# Patient Record
Sex: MALE | Race: WHITE | NOT HISPANIC OR LATINO | Employment: OTHER | URBAN - NONMETROPOLITAN AREA
[De-identification: names, ages, dates, MRNs, and addresses within clinical notes are randomized per-mention and may not be internally consistent; named-entity substitution may affect disease eponyms.]

---

## 2024-07-12 ENCOUNTER — HOSPITAL ENCOUNTER (EMERGENCY)
Facility: HOSPITAL | Age: 79
Discharge: LEFT BEFORE DISCHARGE | End: 2024-07-12
Payer: MEDICARE

## 2024-07-12 ENCOUNTER — APPOINTMENT (OUTPATIENT)
Dept: RADIOLOGY | Facility: HOSPITAL | Age: 79
End: 2024-07-12
Payer: MEDICARE

## 2024-07-12 VITALS
TEMPERATURE: 97.9 F | OXYGEN SATURATION: 89 % | BODY MASS INDEX: 25.9 KG/M2 | HEIGHT: 67 IN | DIASTOLIC BLOOD PRESSURE: 82 MMHG | HEART RATE: 94 BPM | RESPIRATION RATE: 24 BRPM | WEIGHT: 165 LBS | SYSTOLIC BLOOD PRESSURE: 133 MMHG

## 2024-07-12 DIAGNOSIS — K52.9 COLITIS: Primary | ICD-10-CM

## 2024-07-12 LAB
ALBUMIN SERPL BCP-MCNC: 4 G/DL (ref 3.4–5)
ALP SERPL-CCNC: 52 U/L (ref 33–136)
ALT SERPL W P-5'-P-CCNC: 15 U/L (ref 10–52)
ANION GAP SERPL CALC-SCNC: 17 MMOL/L (ref 10–20)
APPEARANCE UR: CLEAR
AST SERPL W P-5'-P-CCNC: 26 U/L (ref 9–39)
BACTERIA #/AREA URNS AUTO: ABNORMAL /HPF
BASOPHILS # BLD AUTO: 0.03 X10*3/UL (ref 0–0.1)
BASOPHILS NFR BLD AUTO: 0.6 %
BILIRUB SERPL-MCNC: 1.6 MG/DL (ref 0–1.2)
BILIRUB UR STRIP.AUTO-MCNC: ABNORMAL MG/DL
BUN SERPL-MCNC: 15 MG/DL (ref 6–23)
CALCIUM SERPL-MCNC: 9.2 MG/DL (ref 8.6–10.3)
CHLORIDE SERPL-SCNC: 94 MMOL/L (ref 98–107)
CO2 SERPL-SCNC: 30 MMOL/L (ref 21–32)
COLOR UR: YELLOW
CREAT SERPL-MCNC: 0.85 MG/DL (ref 0.5–1.3)
EGFRCR SERPLBLD CKD-EPI 2021: 88 ML/MIN/1.73M*2
EOSINOPHIL # BLD AUTO: 0.07 X10*3/UL (ref 0–0.4)
EOSINOPHIL NFR BLD AUTO: 1.4 %
ERYTHROCYTE [DISTWIDTH] IN BLOOD BY AUTOMATED COUNT: 12.9 % (ref 11.5–14.5)
GLUCOSE SERPL-MCNC: 81 MG/DL (ref 74–99)
GLUCOSE UR STRIP.AUTO-MCNC: NORMAL MG/DL
HCT VFR BLD AUTO: 47.5 % (ref 41–52)
HGB BLD-MCNC: 17 G/DL (ref 13.5–17.5)
IMM GRANULOCYTES # BLD AUTO: 0.04 X10*3/UL (ref 0–0.5)
IMM GRANULOCYTES NFR BLD AUTO: 0.8 % (ref 0–0.9)
KETONES UR STRIP.AUTO-MCNC: ABNORMAL MG/DL
LACTATE SERPL-SCNC: 1.3 MMOL/L (ref 0.4–2)
LACTATE SERPL-SCNC: 2.2 MMOL/L (ref 0.4–2)
LEUKOCYTE ESTERASE UR QL STRIP.AUTO: NEGATIVE
LIPASE SERPL-CCNC: 11 U/L (ref 9–82)
LYMPHOCYTES # BLD AUTO: 1.1 X10*3/UL (ref 0.8–3)
LYMPHOCYTES NFR BLD AUTO: 21.2 %
MCH RBC QN AUTO: 34.9 PG (ref 26–34)
MCHC RBC AUTO-ENTMCNC: 35.8 G/DL (ref 32–36)
MCV RBC AUTO: 98 FL (ref 80–100)
MONOCYTES # BLD AUTO: 0.37 X10*3/UL (ref 0.05–0.8)
MONOCYTES NFR BLD AUTO: 7.1 %
MUCOUS THREADS #/AREA URNS AUTO: ABNORMAL /LPF
NEUTROPHILS # BLD AUTO: 3.57 X10*3/UL (ref 1.6–5.5)
NEUTROPHILS NFR BLD AUTO: 68.9 %
NITRITE UR QL STRIP.AUTO: NEGATIVE
NRBC BLD-RTO: 0 /100 WBCS (ref 0–0)
PH UR STRIP.AUTO: 6 [PH]
PLATELET # BLD AUTO: 97 X10*3/UL (ref 150–450)
POTASSIUM SERPL-SCNC: 4.1 MMOL/L (ref 3.5–5.3)
PROT SERPL-MCNC: 6.8 G/DL (ref 6.4–8.2)
PROT UR STRIP.AUTO-MCNC: ABNORMAL MG/DL
RBC # BLD AUTO: 4.87 X10*6/UL (ref 4.5–5.9)
RBC # UR STRIP.AUTO: NEGATIVE /UL
RBC #/AREA URNS AUTO: ABNORMAL /HPF
RBC MORPH BLD: NORMAL
SODIUM SERPL-SCNC: 137 MMOL/L (ref 136–145)
SP GR UR STRIP.AUTO: >1.05
SQUAMOUS #/AREA URNS AUTO: ABNORMAL /HPF
UROBILINOGEN UR STRIP.AUTO-MCNC: NORMAL MG/DL
WBC # BLD AUTO: 5.2 X10*3/UL (ref 4.4–11.3)
WBC #/AREA URNS AUTO: ABNORMAL /HPF

## 2024-07-12 PROCEDURE — 2500000005 HC RX 250 GENERAL PHARMACY W/O HCPCS

## 2024-07-12 PROCEDURE — 81001 URINALYSIS AUTO W/SCOPE: CPT | Performed by: PHYSICIAN ASSISTANT

## 2024-07-12 PROCEDURE — 96374 THER/PROPH/DIAG INJ IV PUSH: CPT | Mod: 59,MUEWO

## 2024-07-12 PROCEDURE — 83605 ASSAY OF LACTIC ACID: CPT | Performed by: PHYSICIAN ASSISTANT

## 2024-07-12 PROCEDURE — 80053 COMPREHEN METABOLIC PANEL: CPT | Performed by: PHYSICIAN ASSISTANT

## 2024-07-12 PROCEDURE — 2500000004 HC RX 250 GENERAL PHARMACY W/ HCPCS (ALT 636 FOR OP/ED): Performed by: PHYSICIAN ASSISTANT

## 2024-07-12 PROCEDURE — 74177 CT ABD & PELVIS W/CONTRAST: CPT

## 2024-07-12 PROCEDURE — 85025 COMPLETE CBC W/AUTO DIFF WBC: CPT | Performed by: PHYSICIAN ASSISTANT

## 2024-07-12 PROCEDURE — 87186 SC STD MICRODIL/AGAR DIL: CPT | Mod: GENLAB | Performed by: PHYSICIAN ASSISTANT

## 2024-07-12 PROCEDURE — 83690 ASSAY OF LIPASE: CPT | Performed by: PHYSICIAN ASSISTANT

## 2024-07-12 PROCEDURE — 74177 CT ABD & PELVIS W/CONTRAST: CPT | Performed by: RADIOLOGY

## 2024-07-12 PROCEDURE — 99284 EMERGENCY DEPT VISIT MOD MDM: CPT | Mod: 25

## 2024-07-12 PROCEDURE — 36415 COLL VENOUS BLD VENIPUNCTURE: CPT | Performed by: PHYSICIAN ASSISTANT

## 2024-07-12 PROCEDURE — 2550000001 HC RX 255 CONTRASTS: Performed by: PHYSICIAN ASSISTANT

## 2024-07-12 PROCEDURE — 2500000001 HC RX 250 WO HCPCS SELF ADMINISTERED DRUGS (ALT 637 FOR MEDICARE OP): Performed by: PHYSICIAN ASSISTANT

## 2024-07-12 PROCEDURE — 96374 THER/PROPH/DIAG INJ IV PUSH: CPT | Mod: 59

## 2024-07-12 RX ORDER — CIPROFLOXACIN 500 MG/1
500 TABLET ORAL 2 TIMES DAILY
Qty: 20 TABLET | Refills: 0 | Status: ON HOLD | OUTPATIENT
Start: 2024-07-12 | End: 2024-07-17

## 2024-07-12 RX ORDER — ONDANSETRON HYDROCHLORIDE 2 MG/ML
4 INJECTION, SOLUTION INTRAVENOUS ONCE
Status: COMPLETED | OUTPATIENT
Start: 2024-07-12 | End: 2024-07-12

## 2024-07-12 RX ORDER — CIPROFLOXACIN 500 MG/1
500 TABLET ORAL ONCE
Status: COMPLETED | OUTPATIENT
Start: 2024-07-12 | End: 2024-07-12

## 2024-07-12 RX ORDER — METRONIDAZOLE 500 MG/1
500 TABLET ORAL ONCE
Status: COMPLETED | OUTPATIENT
Start: 2024-07-12 | End: 2024-07-12

## 2024-07-12 RX ORDER — MORPHINE SULFATE 4 MG/ML
4 INJECTION, SOLUTION INTRAMUSCULAR; INTRAVENOUS ONCE
Status: COMPLETED | OUTPATIENT
Start: 2024-07-12 | End: 2024-07-12

## 2024-07-12 RX ORDER — LIDOCAINE HYDROCHLORIDE 20 MG/ML
JELLY TOPICAL
Status: COMPLETED
Start: 2024-07-12 | End: 2024-07-12

## 2024-07-12 RX ORDER — METRONIDAZOLE 500 MG/1
500 TABLET ORAL 3 TIMES DAILY
Qty: 30 TABLET | Refills: 0 | Status: ON HOLD | OUTPATIENT
Start: 2024-07-12 | End: 2024-07-17

## 2024-07-12 RX ORDER — LIDOCAINE HYDROCHLORIDE 20 MG/ML
1 JELLY TOPICAL ONCE
Status: COMPLETED | OUTPATIENT
Start: 2024-07-12 | End: 2024-07-12

## 2024-07-12 ASSESSMENT — PAIN - FUNCTIONAL ASSESSMENT
PAIN_FUNCTIONAL_ASSESSMENT: 0-10

## 2024-07-12 ASSESSMENT — COLUMBIA-SUICIDE SEVERITY RATING SCALE - C-SSRS
2. HAVE YOU ACTUALLY HAD ANY THOUGHTS OF KILLING YOURSELF?: NO
6. HAVE YOU EVER DONE ANYTHING, STARTED TO DO ANYTHING, OR PREPARED TO DO ANYTHING TO END YOUR LIFE?: NO
1. IN THE PAST MONTH, HAVE YOU WISHED YOU WERE DEAD OR WISHED YOU COULD GO TO SLEEP AND NOT WAKE UP?: NO

## 2024-07-12 ASSESSMENT — PAIN SCALES - GENERAL
PAINLEVEL_OUTOF10: 0 - NO PAIN
PAINLEVEL_OUTOF10: 0 - NO PAIN
PAINLEVEL_OUTOF10: 5 - MODERATE PAIN

## 2024-07-12 ASSESSMENT — PAIN DESCRIPTION - LOCATION: LOCATION: ABDOMEN

## 2024-07-12 ASSESSMENT — PAIN DESCRIPTION - ORIENTATION: ORIENTATION: LOWER

## 2024-07-12 NOTE — ED PROVIDER NOTES
I saw and evaluated the patient. I have personally performed a substantive portion of the encounter and have reviewed the resident´s/[facility] documentation and discussed the patient with the provider.  Please see below for further details.    Subjective:  Patient is an 79-year-old male complain of abdominal pain for the past 8 days.  The pain is primarily in the lower to mid abdomen.  It is towards the midline.  He has had some nausea vomiting as well.  He has had some loose stools without blood in the stool.    Objective:  Visit Vitals  /73 (BP Location: Left arm, Patient Position: Sitting)   Pulse (!) 104   Temp 36.6 °C (97.9 °F) (Temporal)   Resp 16       Alert male who is slow to respond  Head atraumatic  Lung sounds are diminished heart tones are normal regular  Abdomen soft positive mid to lower abdominal tenderness positive guarding  Skin without rash  No focal neurodeficit  Results for orders placed or performed during the hospital encounter of 07/12/24 (from the past 24 hour(s))   CBC and Auto Differential   Result Value Ref Range    WBC 5.2 4.4 - 11.3 x10*3/uL    nRBC 0.0 0.0 - 0.0 /100 WBCs    RBC 4.87 4.50 - 5.90 x10*6/uL    Hemoglobin 17.0 13.5 - 17.5 g/dL    Hematocrit 47.5 41.0 - 52.0 %    MCV 98 80 - 100 fL    MCH 34.9 (H) 26.0 - 34.0 pg    MCHC 35.8 32.0 - 36.0 g/dL    RDW 12.9 11.5 - 14.5 %    Platelets 97 (L) 150 - 450 x10*3/uL    Neutrophils % 68.9 40.0 - 80.0 %    Immature Granulocytes %, Automated 0.8 0.0 - 0.9 %    Lymphocytes % 21.2 13.0 - 44.0 %    Monocytes % 7.1 2.0 - 10.0 %    Eosinophils % 1.4 0.0 - 6.0 %    Basophils % 0.6 0.0 - 2.0 %    Neutrophils Absolute 3.57 1.60 - 5.50 x10*3/uL    Immature Granulocytes Absolute, Automated 0.04 0.00 - 0.50 x10*3/uL    Lymphocytes Absolute 1.10 0.80 - 3.00 x10*3/uL    Monocytes Absolute 0.37 0.05 - 0.80 x10*3/uL    Eosinophils Absolute 0.07 0.00 - 0.40 x10*3/uL    Basophils Absolute 0.03 0.00 - 0.10 x10*3/uL   Comprehensive metabolic panel    Result Value Ref Range    Glucose 81 74 - 99 mg/dL    Sodium 137 136 - 145 mmol/L    Potassium 4.1 3.5 - 5.3 mmol/L    Chloride 94 (L) 98 - 107 mmol/L    Bicarbonate 30 21 - 32 mmol/L    Anion Gap 17 10 - 20 mmol/L    Urea Nitrogen 15 6 - 23 mg/dL    Creatinine 0.85 0.50 - 1.30 mg/dL    eGFR 88 >60 mL/min/1.73m*2    Calcium 9.2 8.6 - 10.3 mg/dL    Albumin 4.0 3.4 - 5.0 g/dL    Alkaline Phosphatase 52 33 - 136 U/L    Total Protein 6.8 6.4 - 8.2 g/dL    AST 26 9 - 39 U/L    Bilirubin, Total 1.6 (H) 0.0 - 1.2 mg/dL    ALT 15 10 - 52 U/L   Lipase   Result Value Ref Range    Lipase 11 9 - 82 U/L   Lactate   Result Value Ref Range    Lactate 2.2 (H) 0.4 - 2.0 mmol/L   Morphology   Result Value Ref Range    RBC Morphology No significant RBC morphology present         Assessment:  Patient CT scan shows a 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  There is no obvious extravasation of dye.    Vascular surgery was consulted.  There is no evidence of acute aortic dissection.  There are chronic aneurysmal changes.  The transfer for emergency vascular surgery consultation was canceled.    Plan:    See ED chart      P Katty Alaniz MD  07/12/24 0919

## 2024-07-12 NOTE — ED NOTES
pt requesting to leave AMA at this time, pt SPO2 89% on RA. pt explained risks of levaing AMA at this time by DINA Moran. pt verbalizes understanding, neighbor at bedside. pt signs AMA paperwork. pt A&Ox4, resp appear labored, skin remains dusky colored, pt departs ED via wheelchair alongside RN. pt asissted into neighbor's vehicle. belongings sent.      Jason Gooden RN  07/12/24 [id number]

## 2024-07-13 LAB — HOLD SPECIMEN: NORMAL

## 2024-07-13 NOTE — ED PROVIDER NOTES
"HPI   Chief Complaint   Patient presents with    Abdominal Pain     Abdominal pain started July 4th after eating chicken left in the chair for 2 days. Has diarrhea and severe stomach pains       History of present illness:  79-year-old male presents emergency room for complaints of diarrhea and diffuse abdominal cramping.  The patient states he began having symptoms about 9 days ago.  He states that he ate some leftover chicken that been sitting out in the sun for a while.  He states that his symptoms began shortly thereafter and he states he began having diffuse cramping and diarrhea.  He states he is having 2-3 episodes a day and he states is mostly watery when does happen.  He has not noticed any blood and has not any nausea or vomiting or any lower back pain or fevers or chills or other symptoms at this time.  He does not currently take any daily medications and states he has not seen a doctor in \"decades\".  He is accompanied by a neighbor as well who states that the patient lives by himself and states that he tries to help him as much as possible.  The patient states he also smokes about a pack cigarettes a day and has done so since about the age of 15.  He states he has no other symptoms at this time.      Social history: Negative for alcohol and drug use.    Review of systems:   Gen.: No weight loss, fatigue, anorexia, insomnia, fever.   Eyes: No vision loss, double vision, drainage, eye pain.   ENT: No pharyngitis, neck pain  Cardiac: No chest pain, palpitations, syncope, near syncope.   Pulmonary: No shortness of breath, cough, hemoptysis.   Heme/lymph: No swollen glands, fever, bleeding.   GI: No change in bowel habits, melena, hematemesis, hematochezia, nausea, vomiting  : No discharge, dysuria, frequency, urgency, hematuria.   Musculoskeletal: No limb pain, joint pain, joint swelling.   Skin: No rashes.   Review of systems is otherwise negative unless stated above or in history of present " illness.        Physical exam:  General: .Vitals noted,  EENT: No lymphadenopathy appreciated  Cardiac: Regular, rate, rhythm, no murmur.   Pulmonary: Lungs clear bilaterally with good aeration. No adventitious breath sounds.   Abdomen: Soft, nonsurgical. No peritoneal signs. Normoactive bowel sounds.  Complains of tenderness palpation throughout the abdomen, no mass appreciated  Extremities: No peripheral edema.   Skin: No rash.   Neuro: No focal neurologic deficits            Medical decision making:   Plan:  CBC CMP lipase lactate urinalysis, CT scan pelvis with contrast: Laboratory testing slight UTI was noted that leukocytosis was not present, CT scan was initially concerning for possible aneurysm and the technologist that took the film requested that we make a stat read and spoke with me concerning her concerns.  I reviewed the films and have the attending physician look at them as well who also agreed that there are concerns for possible new onset aneurysm or possible dissection?  I spoke with rad abs at this time and requested a stat read to make this a priority at this time but they explained to me that they were backed up and that they do their best to get this ready soon as possible.  I reached out to the transfer center who got to vascular surgery on the phone who was able to review the film while on the phone with me and explained to me that this appeared to be a chronic aneurysm that there is no concern for dissection from their point and they believe the patient can follow-up in outpatient setting at this time but would not require transfer.  CT scan was eventually read as concern for colitis and chronic aneurysm as well.  I explained to the patient the test results at this time and that we will be sending him home on oral antibiotics and he was agreeable to this plan.  Nursing staff noted that the patient's pulse ox had been slowly dipping while he been here at the emergency room recently starting at  97 to 98% on room air but quickly dropping down to low 90s.  The patient was observed for short period of time longer and his oxygen dropped to 88%.  We placed him on 2 L of oxygen at this time.  I spoke with the patient at bedside and explained to him that I have concerns about this and requested that he undergo further testing this time.  The patient refused further testing stating that he does want to go home MiraLA[facility] and he had been here all day already.  I explained to the patient that I am unsure the cause of his hypoxia though and I would like to do further testing on him to make sure there is nothing significant at this time.  I explained to him there is possibility that he could just be suffering from a COPD exacerbation and that we could treat this but I was unsure and he confirmed from he did not have oxygen at home.  The patient insisted upon leaving at this time with his neighbor and stated that he did not want to stay for any further testing and they did not want to be admitted at this time.  I explained to him that he would need to sign out AMA at this time and he is agreeable this plan.  I explained to him that he would have to assume full care and responsibility for his actions and I believe that he had capacity to do this.  He verbalized understanding this and explained to me that he understood that he was released in the hospital small liability and that he was taking full responsibility for his actions whenever consequences may occur from there including but not limited to serious harm, part permanent disfigurement and/or death.  I explained to him that he is welcome to return here if he felt the need to and explained to him that I be sending him home with oral antibiotics at this time.  I encouraged him to please follow-up closely with her primary care doctor as well as general surgery at this time he is given a referral page.  I encouraged him to please return in the event that he change his  mind.  I also spoke with his neighbor as well who verbalized understanding of this as well.  The patient signed AMA paperwork at this time presents by nursing staff and was released.    Impression:   1.  Colitis  2.  Hypoxia          History provided by:  Patient   used: No                        Oneyda Coma Scale Score: 15                     Patient History   Past Medical History:   Diagnosis Date    Personal history of other (healed) physical injury and trauma     History of head injury    Personal history of other diseases of the digestive system 10/07/2013    History of esophageal reflux     Past Surgical History:   Procedure Laterality Date    MR HEAD ANGIO WO IV CONTRAST  2/25/2015    MR HEAD ANGIO WO IV CONTRAST 2/25/2015 [facility] INPATIENT LEGACY    ROTATOR CUFF REPAIR  03/25/2013    Rotator Cuff Repair     No family history on file.  Social History     Tobacco Use    Smoking status: Every Day     Current packs/day: 1.00     Types: Cigarettes    Smokeless tobacco: Never   Substance Use Topics    Alcohol use: Not on file    Drug use: Not on file       Physical Exam   ED Triage Vitals [07/12/24 1540]   Temperature Heart Rate Respirations BP   36.6 °C (97.9 °F) (!) 104 16 101/73      Pulse Ox Temp Source Heart Rate Source Patient Position   (!) 93 % Temporal Monitor Sitting      BP Location FiO2 (%)     Left arm --       Physical Exam    ED Course & MDM   Diagnoses as of 07/13/24 1659   Colitis       Medical Decision Making      Procedure  Procedures     Erik Moran PA-C  07/13/24 1705       Erik Moran PA-C  07/13/24 1705

## 2024-07-14 LAB — BACTERIA UR CULT: ABNORMAL

## 2024-07-15 ENCOUNTER — APPOINTMENT (OUTPATIENT)
Dept: RADIOLOGY | Facility: HOSPITAL | Age: 79
End: 2024-07-15
Payer: MEDICARE

## 2024-07-15 ENCOUNTER — APPOINTMENT (OUTPATIENT)
Dept: CARDIOLOGY | Facility: HOSPITAL | Age: 79
End: 2024-07-15
Payer: MEDICARE

## 2024-07-15 ENCOUNTER — HOSPITAL ENCOUNTER (EMERGENCY)
Facility: HOSPITAL | Age: 79
Discharge: OTHER INSTITUTION | End: 2024-07-16
Attending: EMERGENCY MEDICINE
Payer: MEDICARE

## 2024-07-15 DIAGNOSIS — R10.9 ABDOMINAL PAIN, UNSPECIFIED ABDOMINAL LOCATION: ICD-10-CM

## 2024-07-15 DIAGNOSIS — I72.3 ILIAC ARTERY ANEURYSM, RIGHT (CMS-HCC): ICD-10-CM

## 2024-07-15 DIAGNOSIS — I77.1 CELIAC ARTERY STENOSIS (CMS-HCC): Primary | ICD-10-CM

## 2024-07-15 LAB
ALBUMIN SERPL BCP-MCNC: 3.5 G/DL (ref 3.4–5)
ALP SERPL-CCNC: 44 U/L (ref 33–136)
ALT SERPL W P-5'-P-CCNC: 13 U/L (ref 10–52)
ANION GAP SERPL CALC-SCNC: 14 MMOL/L (ref 10–20)
APPEARANCE UR: CLEAR
AST SERPL W P-5'-P-CCNC: 18 U/L (ref 9–39)
BACTERIA UR CULT: ABNORMAL
BASOPHILS # BLD AUTO: 0.03 X10*3/UL (ref 0–0.1)
BASOPHILS NFR BLD AUTO: 0.9 %
BILIRUB SERPL-MCNC: 1.4 MG/DL (ref 0–1.2)
BILIRUB UR STRIP.AUTO-MCNC: NEGATIVE MG/DL
BNP SERPL-MCNC: 229 PG/ML (ref 0–99)
BUN SERPL-MCNC: 19 MG/DL (ref 6–23)
CALCIUM SERPL-MCNC: 8.7 MG/DL (ref 8.6–10.3)
CARDIAC TROPONIN I PNL SERPL HS: 43 NG/L (ref 0–20)
CARDIAC TROPONIN I PNL SERPL HS: 53 NG/L (ref 0–20)
CHLORIDE SERPL-SCNC: 96 MMOL/L (ref 98–107)
CO2 SERPL-SCNC: 33 MMOL/L (ref 21–32)
COLOR UR: ABNORMAL
CREAT SERPL-MCNC: 0.89 MG/DL (ref 0.5–1.3)
EGFRCR SERPLBLD CKD-EPI 2021: 87 ML/MIN/1.73M*2
EOSINOPHIL # BLD AUTO: 0.05 X10*3/UL (ref 0–0.4)
EOSINOPHIL NFR BLD AUTO: 1.5 %
ERYTHROCYTE [DISTWIDTH] IN BLOOD BY AUTOMATED COUNT: 13.2 % (ref 11.5–14.5)
GLUCOSE SERPL-MCNC: 80 MG/DL (ref 74–99)
GLUCOSE UR STRIP.AUTO-MCNC: NORMAL MG/DL
HCT VFR BLD AUTO: 44.2 % (ref 41–52)
HGB BLD-MCNC: 15.5 G/DL (ref 13.5–17.5)
HYALINE CASTS #/AREA URNS AUTO: ABNORMAL /LPF
IMM GRANULOCYTES # BLD AUTO: 0.01 X10*3/UL (ref 0–0.5)
IMM GRANULOCYTES NFR BLD AUTO: 0.3 % (ref 0–0.9)
KETONES UR STRIP.AUTO-MCNC: ABNORMAL MG/DL
LEUKOCYTE ESTERASE UR QL STRIP.AUTO: ABNORMAL
LYMPHOCYTES # BLD AUTO: 0.83 X10*3/UL (ref 0.8–3)
LYMPHOCYTES NFR BLD AUTO: 25.2 %
MAGNESIUM SERPL-MCNC: 1.49 MG/DL (ref 1.6–2.4)
MCH RBC QN AUTO: 34.4 PG (ref 26–34)
MCHC RBC AUTO-ENTMCNC: 35.1 G/DL (ref 32–36)
MCV RBC AUTO: 98 FL (ref 80–100)
MONOCYTES # BLD AUTO: 0.31 X10*3/UL (ref 0.05–0.8)
MONOCYTES NFR BLD AUTO: 9.4 %
MUCOUS THREADS #/AREA URNS AUTO: ABNORMAL /LPF
NEUTROPHILS # BLD AUTO: 2.07 X10*3/UL (ref 1.6–5.5)
NEUTROPHILS NFR BLD AUTO: 62.7 %
NITRITE UR QL STRIP.AUTO: NEGATIVE
NRBC BLD-RTO: 0 /100 WBCS (ref 0–0)
PH UR STRIP.AUTO: 6 [PH]
PLATELET # BLD AUTO: 93 X10*3/UL (ref 150–450)
POTASSIUM SERPL-SCNC: 3.5 MMOL/L (ref 3.5–5.3)
PROT SERPL-MCNC: 5.9 G/DL (ref 6.4–8.2)
PROT UR STRIP.AUTO-MCNC: ABNORMAL MG/DL
RBC # BLD AUTO: 4.51 X10*6/UL (ref 4.5–5.9)
RBC # UR STRIP.AUTO: NEGATIVE /UL
RBC #/AREA URNS AUTO: ABNORMAL /HPF
SODIUM SERPL-SCNC: 139 MMOL/L (ref 136–145)
SP GR UR STRIP.AUTO: 1.04
SQUAMOUS #/AREA URNS AUTO: ABNORMAL /HPF
UROBILINOGEN UR STRIP.AUTO-MCNC: ABNORMAL MG/DL
WBC # BLD AUTO: 3.3 X10*3/UL (ref 4.4–11.3)
WBC #/AREA URNS AUTO: ABNORMAL /HPF
WBC CLUMPS #/AREA URNS AUTO: ABNORMAL /HPF

## 2024-07-15 PROCEDURE — 72131 CT LUMBAR SPINE W/O DYE: CPT | Mod: RCN | Performed by: RADIOLOGY

## 2024-07-15 PROCEDURE — 36415 COLL VENOUS BLD VENIPUNCTURE: CPT | Performed by: EMERGENCY MEDICINE

## 2024-07-15 PROCEDURE — 2500000004 HC RX 250 GENERAL PHARMACY W/ HCPCS (ALT 636 FOR OP/ED)

## 2024-07-15 PROCEDURE — 96374 THER/PROPH/DIAG INJ IV PUSH: CPT

## 2024-07-15 PROCEDURE — 85025 COMPLETE CBC W/AUTO DIFF WBC: CPT | Performed by: EMERGENCY MEDICINE

## 2024-07-15 PROCEDURE — 83880 ASSAY OF NATRIURETIC PEPTIDE: CPT | Performed by: EMERGENCY MEDICINE

## 2024-07-15 PROCEDURE — 84484 ASSAY OF TROPONIN QUANT: CPT | Performed by: EMERGENCY MEDICINE

## 2024-07-15 PROCEDURE — 99285 EMERGENCY DEPT VISIT HI MDM: CPT | Mod: 25

## 2024-07-15 PROCEDURE — 96366 THER/PROPH/DIAG IV INF ADDON: CPT

## 2024-07-15 PROCEDURE — 72131 CT LUMBAR SPINE W/O DYE: CPT | Mod: RCN

## 2024-07-15 PROCEDURE — 96365 THER/PROPH/DIAG IV INF INIT: CPT

## 2024-07-15 PROCEDURE — 96361 HYDRATE IV INFUSION ADD-ON: CPT

## 2024-07-15 PROCEDURE — 72128 CT CHEST SPINE W/O DYE: CPT | Mod: RCN | Performed by: RADIOLOGY

## 2024-07-15 PROCEDURE — 93005 ELECTROCARDIOGRAM TRACING: CPT

## 2024-07-15 PROCEDURE — 72128 CT CHEST SPINE W/O DYE: CPT | Mod: RCN

## 2024-07-15 PROCEDURE — 96367 TX/PROPH/DG ADDL SEQ IV INF: CPT

## 2024-07-15 PROCEDURE — 2500000005 HC RX 250 GENERAL PHARMACY W/O HCPCS: Performed by: EMERGENCY MEDICINE

## 2024-07-15 PROCEDURE — 71275 CT ANGIOGRAPHY CHEST: CPT

## 2024-07-15 PROCEDURE — 81001 URINALYSIS AUTO W/SCOPE: CPT | Performed by: EMERGENCY MEDICINE

## 2024-07-15 PROCEDURE — 2500000004 HC RX 250 GENERAL PHARMACY W/ HCPCS (ALT 636 FOR OP/ED): Performed by: EMERGENCY MEDICINE

## 2024-07-15 PROCEDURE — 87086 URINE CULTURE/COLONY COUNT: CPT | Mod: GENLAB | Performed by: EMERGENCY MEDICINE

## 2024-07-15 PROCEDURE — 83735 ASSAY OF MAGNESIUM: CPT | Performed by: EMERGENCY MEDICINE

## 2024-07-15 PROCEDURE — 2550000001 HC RX 255 CONTRASTS: Performed by: EMERGENCY MEDICINE

## 2024-07-15 PROCEDURE — 96375 TX/PRO/DX INJ NEW DRUG ADDON: CPT

## 2024-07-15 PROCEDURE — 84075 ASSAY ALKALINE PHOSPHATASE: CPT | Performed by: EMERGENCY MEDICINE

## 2024-07-15 PROCEDURE — 71275 CT ANGIOGRAPHY CHEST: CPT | Performed by: RADIOLOGY

## 2024-07-15 PROCEDURE — 74174 CTA ABD&PLVS W/CONTRAST: CPT | Performed by: RADIOLOGY

## 2024-07-15 RX ORDER — ONDANSETRON HYDROCHLORIDE 2 MG/ML
4 INJECTION, SOLUTION INTRAVENOUS ONCE
Status: COMPLETED | OUTPATIENT
Start: 2024-07-15 | End: 2024-07-15

## 2024-07-15 RX ORDER — ONDANSETRON HYDROCHLORIDE 2 MG/ML
INJECTION, SOLUTION INTRAVENOUS
Status: COMPLETED
Start: 2024-07-15 | End: 2024-07-15

## 2024-07-15 RX ORDER — MORPHINE SULFATE 4 MG/ML
INJECTION, SOLUTION INTRAMUSCULAR; INTRAVENOUS
Status: COMPLETED
Start: 2024-07-15 | End: 2024-07-15

## 2024-07-15 RX ORDER — MORPHINE SULFATE 4 MG/ML
4 INJECTION, SOLUTION INTRAMUSCULAR; INTRAVENOUS ONCE
Status: COMPLETED | OUTPATIENT
Start: 2024-07-15 | End: 2024-07-15

## 2024-07-15 RX ORDER — MAGNESIUM SULFATE HEPTAHYDRATE 40 MG/ML
2 INJECTION, SOLUTION INTRAVENOUS ONCE
Status: COMPLETED | OUTPATIENT
Start: 2024-07-15 | End: 2024-07-15

## 2024-07-15 RX ORDER — CEFTRIAXONE 2 G/50ML
2 INJECTION, SOLUTION INTRAVENOUS ONCE
Status: COMPLETED | OUTPATIENT
Start: 2024-07-15 | End: 2024-07-15

## 2024-07-15 RX ADMIN — ONDANSETRON 4 MG: 2 INJECTION INTRAMUSCULAR; INTRAVENOUS at 16:05

## 2024-07-15 RX ADMIN — IOHEXOL 75 ML: 350 INJECTION, SOLUTION INTRAVENOUS at 16:20

## 2024-07-15 RX ADMIN — Medication 2 L/MIN: at 16:26

## 2024-07-15 RX ADMIN — MORPHINE SULFATE 4 MG: 4 INJECTION, SOLUTION INTRAMUSCULAR; INTRAVENOUS at 16:05

## 2024-07-15 RX ADMIN — CEFTRIAXONE 2 G: 2 INJECTION, SOLUTION INTRAVENOUS at 18:18

## 2024-07-15 RX ADMIN — ONDANSETRON HYDROCHLORIDE 4 MG: 2 INJECTION, SOLUTION INTRAVENOUS at 16:05

## 2024-07-15 RX ADMIN — SODIUM CHLORIDE 1000 ML: 9 INJECTION, SOLUTION INTRAVENOUS at 16:06

## 2024-07-15 RX ADMIN — MAGNESIUM SULFATE IN WATER 2 G: 40 INJECTION, SOLUTION INTRAVENOUS at 19:12

## 2024-07-15 RX ADMIN — Medication 2 L/MIN: at 20:00

## 2024-07-15 ASSESSMENT — PAIN SCALES - GENERAL
PAINLEVEL_OUTOF10: 4
PAINLEVEL_OUTOF10: 10 - WORST POSSIBLE PAIN

## 2024-07-15 ASSESSMENT — PAIN DESCRIPTION - LOCATION: LOCATION: ABDOMEN

## 2024-07-15 ASSESSMENT — PAIN - FUNCTIONAL ASSESSMENT: PAIN_FUNCTIONAL_ASSESSMENT: 0-10

## 2024-07-15 NOTE — ED PROVIDER NOTES
HPI   Chief Complaint   Patient presents with    Abdominal Pain     Pt having severe abd pain that started a little over a week ago, was seen in ED a few days ago and they were initially going to fly pt out because they thought he had an AAA, however, they called back and said his scans were chronic and he was not a dissection. Pt still having severe abd pain, not able to eat or drink, per pt neighbor, he has lost weight over the last couple weeks. 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  Complaining of severe lower abdominal pain.  He is having difficulty tolerating liquids or solids.  He is lost some weight over the past couple weeks.  He is also having difficulty holding his urine and bowel movements.      History provided by:  Patient and medical records                      Oneyda Coma Scale Score: 15                     Patient History   Past Medical History:   Diagnosis Date    Personal history of other (healed) physical injury and trauma     History of head injury    Personal history of other diseases of the digestive system 10/07/2013    History of esophageal reflux     Past Surgical History:   Procedure Laterality Date    MR HEAD ANGIO WO IV CONTRAST  2/25/2015    MR HEAD ANGIO WO IV CONTRAST 2/25/2015 [facility] INPATIENT LEGACY    ROTATOR CUFF REPAIR  03/25/2013    Rotator Cuff Repair     No family history on file.  Social History     Tobacco Use    Smoking status: Every Day     Current packs/day: 1.00     Types: Cigarettes    Smokeless tobacco: Never   Substance Use Topics    Alcohol use: Not on file    Drug use: Not on file       Physical Exam   ED Triage Vitals [07/15/24 1554]   Temperature Heart Rate Respirations BP   36.9 °C (98.5 °F) 77 (!) 22 126/88       Pulse Ox Temp Source Heart Rate Source Patient Position   94 % Temporal -- --      BP Location FiO2 (%)     -- --       Physical Exam  Vitals and nursing note reviewed.   Constitutional:       General: He is not in acute distress.     Appearance: He is well-developed.   HENT:      Head: Normocephalic and atraumatic.   Eyes:      Conjunctiva/sclera: Conjunctivae normal.   Cardiovascular:      Rate and Rhythm: Normal rate and regular rhythm.      Pulses:           Radial pulses are 2+ on the right side and 2+ on the left side.        Dorsalis pedis pulses are 2+ on the right side and 2+ on the left side.      Heart sounds: No murmur heard.     Comments: Equal peripheral pulses to bilateral radial and DP.  Pulmonary:      Effort: Pulmonary effort is normal. No respiratory distress.      Breath sounds: Normal breath sounds.   Abdominal:      Palpations: Abdomen is soft.      Tenderness: There is generalized abdominal tenderness. There is no right CVA tenderness, left CVA tenderness, guarding or rebound.      Comments: 2+ right and left symmetric femoral pulses.  Bilateral lower extremities are well-perfused.  No evidence of acute limb ischemia.   Musculoskeletal:         General: No swelling, tenderness or deformity.      Cervical back: Neck supple.   Skin:     General: Skin is warm and dry.      Capillary Refill: Capillary refill takes less than 2 seconds.   Neurological:      Mental Status: He is alert and oriented to person, place, and time.      Cranial Nerves: No cranial nerve deficit.      Sensory: No sensory deficit.      Motor: No weakness.      Coordination: Coordination normal.      Gait: Gait normal.   Psychiatric:         Mood and Affect: Mood normal.         ED Course & MDM   ED Course as of 07/23/24 0717   Mon Jul 15, 2024   7533 IMPRESSION:  1. Cirrhotic liver with varices.  2. Apparent gallbladder wall thickening, most likely due to hepatic  disease. 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  3. Enlarged heterogenous prostate gland projecting in the bladder  floor.  4. Minimal nonspecific colonic wall thickening, possibly due to  infectious or inflammatory colitis, without abscess or bowel  obstruction.      Signed by: Kaiden James 7/12/2024 4:42 PM      Noted from CT 7/12/24 [BT]   1601 ECG 12 lead  EKG shows sinus rhythm with occasional PVCs.  Right bundle branch block.  Rate = 78.  No acute injury pattern.  EKG performed on 7/12 showed PVCs as well [BT]   1628 79-year-old male with known right iliac artery aneurysm presents with ongoing abdominal pain.  Labs, morphine for pain, Zofran for nausea, saline bolus.  CTA chest abdomen and pelvis to evaluate for aortic dissection or other cause of symptoms. [BT]   1726 Troponin I, High Sensitivity(!!): 53  First troponin 53 [BT]   1726 ECG 12 lead  EKG shows sinus rhythm with right bundle branch block.  Rate = 73.  Nonspecific ST-T changes.  No acute injury pattern.  Unchanged from prior. [BT]   1747 ABDOMEN - PELVIS  1.  Cirrhotic liver.  2. Distended gallbladder.  3. Atherosclerosis as described. 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  4. Prostatomegaly.   [BT]   1748 PLATELETS (10*3/UL) IN BLOOD AUTOMATED COUNT, LAKHWINDER(!): 93  Stable thrombocytopenia [BT]   1807 Urinalysis with Reflex Microscopic(!)  Urine shows UTI.  He was given Rocephin. [BT]   1842 Will discuss mesenteric artery stenosis with vascular surgery. [BT]   1844 MAGNESIUM(!): 1.49  Replaced with IV magnesium [BT]   1908 D/W vascular surgery Dr. Kruse who advised transfer to [facility] for observation and  vascular surgery consultation if needed. [BT]   1948 Spoke with Clair Zarco NP at [facility] who accepted patient for hospitalization under observation with telemetry under the service of Dr. Claire [BT]      ED Course User Index  [BT] Hernandez Esparza DO         Diagnoses as of 07/23/24 0717   Abdominal pain, unspecified abdominal location   Iliac artery aneurysm, right (CMS-HCC)   Celiac artery stenosis (CMS-HCC)       Medical Decision Making      Procedure  Procedures     Hernandez Esparza DO  07/23/24 0719

## 2024-07-15 NOTE — ED TRIAGE NOTES
Pt having severe abd pain that started a little over a week ago, was seen in ED a few days ago and they were initially going to fly pt out because they thought he had an AAA, however, they called back and said his scans were chronic and he was not a dissection. Pt still having severe abd pain, not able to eat or drink, per pt neighbor, he has lost weight over the last couple weeks. Pt states the last few weeks he has had a hard time holding his urine and bowel movements

## 2024-07-16 ENCOUNTER — APPOINTMENT (OUTPATIENT)
Dept: RADIOLOGY | Facility: HOSPITAL | Age: 79
End: 2024-07-16
Payer: MEDICARE

## 2024-07-16 ENCOUNTER — APPOINTMENT (OUTPATIENT)
Dept: CARDIOLOGY | Facility: HOSPITAL | Age: 79
End: 2024-07-16
Payer: MEDICARE

## 2024-07-16 ENCOUNTER — HOSPITAL ENCOUNTER (INPATIENT)
Facility: HOSPITAL | Age: 79
LOS: 4 days | Discharge: HOME | End: 2024-07-20
Attending: INTERNAL MEDICINE | Admitting: INTERNAL MEDICINE
Payer: MEDICARE

## 2024-07-16 ENCOUNTER — APPOINTMENT (OUTPATIENT)
Dept: VASCULAR MEDICINE | Facility: HOSPITAL | Age: 79
End: 2024-07-16
Payer: MEDICARE

## 2024-07-16 VITALS
HEART RATE: 78 BPM | HEIGHT: 67 IN | OXYGEN SATURATION: 96 % | BODY MASS INDEX: 22.49 KG/M2 | DIASTOLIC BLOOD PRESSURE: 80 MMHG | RESPIRATION RATE: 18 BRPM | SYSTOLIC BLOOD PRESSURE: 137 MMHG | WEIGHT: 143.3 LBS | TEMPERATURE: 98.5 F

## 2024-07-16 DIAGNOSIS — R45.1 AGITATION: ICD-10-CM

## 2024-07-16 DIAGNOSIS — R10.9 INTRACTABLE ABDOMINAL PAIN: Primary | ICD-10-CM

## 2024-07-16 DIAGNOSIS — K52.9 COLITIS: ICD-10-CM

## 2024-07-16 DIAGNOSIS — N39.0 RECURRENT UTI: ICD-10-CM

## 2024-07-16 DIAGNOSIS — K55.1 MESENTERIC ISCHEMIA, CHRONIC (MULTI): ICD-10-CM

## 2024-07-16 LAB
ALBUMIN SERPL BCP-MCNC: 3.3 G/DL (ref 3.4–5)
ALP SERPL-CCNC: 43 U/L (ref 33–136)
ALT SERPL W P-5'-P-CCNC: 13 U/L (ref 10–52)
ANION GAP SERPL CALC-SCNC: 11 MMOL/L (ref 10–20)
APPEARANCE UR: CLEAR
APTT PPP: 31 SECONDS (ref 27–38)
AST SERPL W P-5'-P-CCNC: 17 U/L (ref 9–39)
ATRIAL RATE: 73 BPM
ATRIAL RATE: 90 BPM
BILIRUB SERPL-MCNC: 0.9 MG/DL (ref 0–1.2)
BILIRUB UR STRIP.AUTO-MCNC: NEGATIVE MG/DL
BUN SERPL-MCNC: 17 MG/DL (ref 6–23)
CALCIUM SERPL-MCNC: 8 MG/DL (ref 8.6–10.3)
CHLORIDE SERPL-SCNC: 97 MMOL/L (ref 98–107)
CO2 SERPL-SCNC: 34 MMOL/L (ref 21–32)
COLOR UR: YELLOW
CREAT SERPL-MCNC: 0.79 MG/DL (ref 0.5–1.3)
EGFRCR SERPLBLD CKD-EPI 2021: 90 ML/MIN/1.73M*2
ERYTHROCYTE [DISTWIDTH] IN BLOOD BY AUTOMATED COUNT: 13.3 % (ref 11.5–14.5)
GLUCOSE SERPL-MCNC: 120 MG/DL (ref 74–99)
GLUCOSE UR STRIP.AUTO-MCNC: NORMAL MG/DL
HCT VFR BLD AUTO: 41.6 % (ref 41–52)
HGB BLD-MCNC: 14.3 G/DL (ref 13.5–17.5)
HOLD SPECIMEN: NORMAL
INR PPP: 1.1 (ref 0.9–1.1)
KETONES UR STRIP.AUTO-MCNC: ABNORMAL MG/DL
LACTATE SERPL-SCNC: 1 MMOL/L (ref 0.4–2)
LACTATE SERPL-SCNC: 1 MMOL/L (ref 0.4–2)
LACTATE SERPL-SCNC: 1.6 MMOL/L (ref 0.4–2)
LEUKOCYTE ESTERASE UR QL STRIP.AUTO: NEGATIVE
MAGNESIUM SERPL-MCNC: 1.65 MG/DL (ref 1.6–2.4)
MCH RBC QN AUTO: 34.5 PG (ref 26–34)
MCHC RBC AUTO-ENTMCNC: 34.4 G/DL (ref 32–36)
MCV RBC AUTO: 100 FL (ref 80–100)
NITRITE UR QL STRIP.AUTO: NEGATIVE
NRBC BLD-RTO: 0 /100 WBCS (ref 0–0)
P AXIS: 74 DEGREES
P AXIS: 77 DEGREES
P OFFSET: 208 MS
P OFFSET: 209 MS
P ONSET: 154 MS
P ONSET: 158 MS
PH UR STRIP.AUTO: 5.5 [PH]
PLATELET # BLD AUTO: 84 X10*3/UL (ref 150–450)
POTASSIUM SERPL-SCNC: 3.5 MMOL/L (ref 3.5–5.3)
PR INTERVAL: 130 MS
PR INTERVAL: 144 MS
PROT SERPL-MCNC: 5.6 G/DL (ref 6.4–8.2)
PROT UR STRIP.AUTO-MCNC: ABNORMAL MG/DL
PROTHROMBIN TIME: 12.8 SECONDS (ref 9.8–12.8)
Q ONSET: 223 MS
Q ONSET: 226 MS
QRS COUNT: 12 BEATS
QRS COUNT: 15 BEATS
QRS DURATION: 140 MS
QRS DURATION: 146 MS
QT INTERVAL: 432 MS
QT INTERVAL: 460 MS
QTC CALCULATION(BAZETT): 506 MS
QTC CALCULATION(BAZETT): 528 MS
QTC FREDERICIA: 491 MS
QTC FREDERICIA: 494 MS
R AXIS: 10 DEGREES
R AXIS: 58 DEGREES
RBC # BLD AUTO: 4.15 X10*6/UL (ref 4.5–5.9)
RBC # UR STRIP.AUTO: NEGATIVE /UL
RBC #/AREA URNS AUTO: NORMAL /HPF
SODIUM SERPL-SCNC: 138 MMOL/L (ref 136–145)
SP GR UR STRIP.AUTO: 1.03
T AXIS: 12 DEGREES
T AXIS: 56 DEGREES
T OFFSET: 439 MS
T OFFSET: 456 MS
UFH PPP CHRO-ACNC: 0.7 IU/ML
UFH PPP CHRO-ACNC: <0.1 IU/ML
URATE CRY #/AREA UR COMP ASSIST: NORMAL /HPF
UROBILINOGEN UR STRIP.AUTO-MCNC: NORMAL MG/DL
VENTRICULAR RATE: 73 BPM
VENTRICULAR RATE: 90 BPM
WBC # BLD AUTO: 3.7 X10*3/UL (ref 4.4–11.3)
WBC #/AREA URNS AUTO: NORMAL /HPF

## 2024-07-16 PROCEDURE — 93975 VASCULAR STUDY: CPT

## 2024-07-16 PROCEDURE — 99223 1ST HOSP IP/OBS HIGH 75: CPT | Performed by: NURSE PRACTITIONER

## 2024-07-16 PROCEDURE — 87493 C DIFF AMPLIFIED PROBE: CPT | Mod: 59,GEALAB | Performed by: NURSE PRACTITIONER

## 2024-07-16 PROCEDURE — 87506 IADNA-DNA/RNA PROBE TQ 6-11: CPT | Mod: GEALAB | Performed by: NURSE PRACTITIONER

## 2024-07-16 PROCEDURE — 99221 1ST HOSP IP/OBS SF/LOW 40: CPT | Performed by: INTERNAL MEDICINE

## 2024-07-16 PROCEDURE — 93975 VASCULAR STUDY: CPT | Performed by: INTERNAL MEDICINE

## 2024-07-16 PROCEDURE — 85730 THROMBOPLASTIN TIME PARTIAL: CPT | Performed by: NURSE PRACTITIONER

## 2024-07-16 PROCEDURE — 93975 VASCULAR STUDY: CPT | Performed by: RADIOLOGY

## 2024-07-16 PROCEDURE — 83605 ASSAY OF LACTIC ACID: CPT | Performed by: NURSE PRACTITIONER

## 2024-07-16 PROCEDURE — 2500000002 HC RX 250 W HCPCS SELF ADMINISTERED DRUGS (ALT 637 FOR MEDICARE OP, ALT 636 FOR OP/ED): Performed by: INTERNAL MEDICINE

## 2024-07-16 PROCEDURE — 81001 URINALYSIS AUTO W/SCOPE: CPT | Performed by: NURSE PRACTITIONER

## 2024-07-16 PROCEDURE — 2500000004 HC RX 250 GENERAL PHARMACY W/ HCPCS (ALT 636 FOR OP/ED)

## 2024-07-16 PROCEDURE — 85610 PROTHROMBIN TIME: CPT | Performed by: NURSE PRACTITIONER

## 2024-07-16 PROCEDURE — 83605 ASSAY OF LACTIC ACID: CPT | Performed by: EMERGENCY MEDICINE

## 2024-07-16 PROCEDURE — 2500000002 HC RX 250 W HCPCS SELF ADMINISTERED DRUGS (ALT 637 FOR MEDICARE OP, ALT 636 FOR OP/ED): Performed by: NURSE PRACTITIONER

## 2024-07-16 PROCEDURE — 76705 ECHO EXAM OF ABDOMEN: CPT | Performed by: RADIOLOGY

## 2024-07-16 PROCEDURE — 85520 HEPARIN ASSAY: CPT | Performed by: NURSE PRACTITIONER

## 2024-07-16 PROCEDURE — 94664 DEMO&/EVAL PT USE INHALER: CPT

## 2024-07-16 PROCEDURE — S4991 NICOTINE PATCH NONLEGEND: HCPCS | Performed by: NURSE PRACTITIONER

## 2024-07-16 PROCEDURE — 85027 COMPLETE CBC AUTOMATED: CPT | Performed by: NURSE PRACTITIONER

## 2024-07-16 PROCEDURE — 36415 COLL VENOUS BLD VENIPUNCTURE: CPT | Performed by: EMERGENCY MEDICINE

## 2024-07-16 PROCEDURE — 94640 AIRWAY INHALATION TREATMENT: CPT

## 2024-07-16 PROCEDURE — 85520 HEPARIN ASSAY: CPT | Performed by: INTERNAL MEDICINE

## 2024-07-16 PROCEDURE — 1200000002 HC GENERAL ROOM WITH TELEMETRY DAILY

## 2024-07-16 PROCEDURE — 96375 TX/PRO/DX INJ NEW DRUG ADDON: CPT

## 2024-07-16 PROCEDURE — C9113 INJ PANTOPRAZOLE SODIUM, VIA: HCPCS | Performed by: NURSE PRACTITIONER

## 2024-07-16 PROCEDURE — 87328 CRYPTOSPORIDIUM AG IA: CPT | Performed by: NURSE PRACTITIONER

## 2024-07-16 PROCEDURE — 80053 COMPREHEN METABOLIC PANEL: CPT | Performed by: NURSE PRACTITIONER

## 2024-07-16 PROCEDURE — 83735 ASSAY OF MAGNESIUM: CPT | Performed by: NURSE PRACTITIONER

## 2024-07-16 PROCEDURE — 87329 GIARDIA AG IA: CPT | Performed by: NURSE PRACTITIONER

## 2024-07-16 PROCEDURE — 36415 COLL VENOUS BLD VENIPUNCTURE: CPT | Performed by: NURSE PRACTITIONER

## 2024-07-16 PROCEDURE — 94760 N-INVAS EAR/PLS OXIMETRY 1: CPT

## 2024-07-16 PROCEDURE — 93005 ELECTROCARDIOGRAM TRACING: CPT

## 2024-07-16 PROCEDURE — 99223 1ST HOSP IP/OBS HIGH 75: CPT | Performed by: INTERNAL MEDICINE

## 2024-07-16 PROCEDURE — 2500000004 HC RX 250 GENERAL PHARMACY W/ HCPCS (ALT 636 FOR OP/ED): Performed by: NURSE PRACTITIONER

## 2024-07-16 RX ORDER — ACETAMINOPHEN 325 MG/1
650 TABLET ORAL EVERY 6 HOURS PRN
Status: ON HOLD | COMMUNITY

## 2024-07-16 RX ORDER — DEXTROSE MONOHYDRATE, SODIUM CHLORIDE, AND POTASSIUM CHLORIDE 50; 1.49; 4.5 G/1000ML; G/1000ML; G/1000ML
100 INJECTION, SOLUTION INTRAVENOUS CONTINUOUS
Status: DISCONTINUED | OUTPATIENT
Start: 2024-07-16 | End: 2024-07-18

## 2024-07-16 RX ORDER — HEPARIN SODIUM 10000 [USP'U]/100ML
0-4000 INJECTION, SOLUTION INTRAVENOUS CONTINUOUS
Status: DISCONTINUED | OUTPATIENT
Start: 2024-07-16 | End: 2024-07-16

## 2024-07-16 RX ORDER — MAGNESIUM SULFATE HEPTAHYDRATE 40 MG/ML
2 INJECTION, SOLUTION INTRAVENOUS ONCE
Status: COMPLETED | OUTPATIENT
Start: 2024-07-16 | End: 2024-07-16

## 2024-07-16 RX ORDER — PANTOPRAZOLE SODIUM 40 MG/10ML
40 INJECTION, POWDER, LYOPHILIZED, FOR SOLUTION INTRAVENOUS DAILY
Status: DISCONTINUED | OUTPATIENT
Start: 2024-07-16 | End: 2024-07-18

## 2024-07-16 RX ORDER — ONDANSETRON HYDROCHLORIDE 2 MG/ML
4 INJECTION, SOLUTION INTRAVENOUS ONCE
Status: COMPLETED | OUTPATIENT
Start: 2024-07-16 | End: 2024-07-16

## 2024-07-16 RX ORDER — ONDANSETRON 4 MG/1
4 TABLET, FILM COATED ORAL EVERY 8 HOURS PRN
Status: DISCONTINUED | OUTPATIENT
Start: 2024-07-16 | End: 2024-07-20 | Stop reason: HOSPADM

## 2024-07-16 RX ORDER — IPRATROPIUM BROMIDE AND ALBUTEROL SULFATE 2.5; .5 MG/3ML; MG/3ML
3 SOLUTION RESPIRATORY (INHALATION) 3 TIMES DAILY
Status: DISCONTINUED | OUTPATIENT
Start: 2024-07-16 | End: 2024-07-16

## 2024-07-16 RX ORDER — ACETAMINOPHEN 325 MG/1
650 TABLET ORAL EVERY 4 HOURS PRN
Status: DISCONTINUED | OUTPATIENT
Start: 2024-07-16 | End: 2024-07-20 | Stop reason: HOSPADM

## 2024-07-16 RX ORDER — ASPIRIN 81 MG/1
81 TABLET ORAL DAILY PRN
Status: ON HOLD | COMMUNITY

## 2024-07-16 RX ORDER — ACETAMINOPHEN 160 MG/5ML
650 SOLUTION ORAL EVERY 4 HOURS PRN
Status: DISCONTINUED | OUTPATIENT
Start: 2024-07-16 | End: 2024-07-20 | Stop reason: HOSPADM

## 2024-07-16 RX ORDER — ONDANSETRON HYDROCHLORIDE 2 MG/ML
4 INJECTION, SOLUTION INTRAVENOUS EVERY 8 HOURS PRN
Status: DISCONTINUED | OUTPATIENT
Start: 2024-07-16 | End: 2024-07-20 | Stop reason: HOSPADM

## 2024-07-16 RX ORDER — DIPHENHYDRAMINE HYDROCHLORIDE 50 MG/ML
25 INJECTION INTRAMUSCULAR; INTRAVENOUS EVERY 8 HOURS PRN
Status: DISCONTINUED | OUTPATIENT
Start: 2024-07-16 | End: 2024-07-18

## 2024-07-16 RX ORDER — IBUPROFEN 200 MG
1 TABLET ORAL DAILY
Status: DISCONTINUED | OUTPATIENT
Start: 2024-07-16 | End: 2024-07-17

## 2024-07-16 RX ORDER — IBUPROFEN 200 MG
1 TABLET ORAL DAILY
Status: DISCONTINUED | OUTPATIENT
Start: 2024-08-27 | End: 2024-07-17

## 2024-07-16 RX ORDER — ONDANSETRON HYDROCHLORIDE 2 MG/ML
INJECTION, SOLUTION INTRAVENOUS
Status: COMPLETED
Start: 2024-07-16 | End: 2024-07-16

## 2024-07-16 RX ORDER — ALBUTEROL SULFATE 0.83 MG/ML
2.5 SOLUTION RESPIRATORY (INHALATION) EVERY 2 HOUR PRN
Status: DISCONTINUED | OUTPATIENT
Start: 2024-07-16 | End: 2024-07-20 | Stop reason: HOSPADM

## 2024-07-16 RX ORDER — DIPHENHYDRAMINE HCL 25 MG
25 CAPSULE ORAL EVERY 8 HOURS PRN
Status: DISCONTINUED | OUTPATIENT
Start: 2024-07-16 | End: 2024-07-18

## 2024-07-16 RX ORDER — HEPARIN SODIUM 10000 [USP'U]/100ML
0-4500 INJECTION, SOLUTION INTRAVENOUS CONTINUOUS
Status: DISCONTINUED | OUTPATIENT
Start: 2024-07-16 | End: 2024-07-16

## 2024-07-16 RX ORDER — IPRATROPIUM BROMIDE AND ALBUTEROL SULFATE 2.5; .5 MG/3ML; MG/3ML
3 SOLUTION RESPIRATORY (INHALATION)
Status: DISCONTINUED | OUTPATIENT
Start: 2024-07-16 | End: 2024-07-20 | Stop reason: HOSPADM

## 2024-07-16 RX ORDER — NICOTINE 7MG/24HR
1 PATCH, TRANSDERMAL 24 HOURS TRANSDERMAL DAILY
Status: DISCONTINUED | OUTPATIENT
Start: 2024-09-10 | End: 2024-07-17

## 2024-07-16 RX ORDER — ALBUTEROL SULFATE 0.83 MG/ML
2.5 SOLUTION RESPIRATORY (INHALATION) EVERY 4 HOURS PRN
Status: DISCONTINUED | OUTPATIENT
Start: 2024-07-16 | End: 2024-07-16

## 2024-07-16 RX ORDER — ACETAMINOPHEN 650 MG/1
650 SUPPOSITORY RECTAL EVERY 4 HOURS PRN
Status: DISCONTINUED | OUTPATIENT
Start: 2024-07-16 | End: 2024-07-20 | Stop reason: HOSPADM

## 2024-07-16 RX ADMIN — HYDROMORPHONE HYDROCHLORIDE 0.5 MG: 1 INJECTION, SOLUTION INTRAMUSCULAR; INTRAVENOUS; SUBCUTANEOUS at 01:20

## 2024-07-16 RX ADMIN — ONDANSETRON 4 MG: 2 INJECTION INTRAMUSCULAR; INTRAVENOUS at 01:20

## 2024-07-16 RX ADMIN — ONDANSETRON HYDROCHLORIDE 4 MG: 2 INJECTION, SOLUTION INTRAVENOUS at 01:20

## 2024-07-16 SDOH — SOCIAL STABILITY: SOCIAL INSECURITY: HAS ANYONE EVER THREATENED TO HURT YOUR FAMILY OR YOUR PETS?: NO

## 2024-07-16 SDOH — SOCIAL STABILITY: SOCIAL INSECURITY: DO YOU FEEL UNSAFE GOING BACK TO THE PLACE WHERE YOU ARE LIVING?: NO

## 2024-07-16 SDOH — SOCIAL STABILITY: SOCIAL INSECURITY: ARE THERE ANY APPARENT SIGNS OF INJURIES/BEHAVIORS THAT COULD BE RELATED TO ABUSE/NEGLECT?: NO

## 2024-07-16 SDOH — SOCIAL STABILITY: SOCIAL INSECURITY: DO YOU FEEL ANYONE HAS EXPLOITED OR TAKEN ADVANTAGE OF YOU FINANCIALLY OR OF YOUR PERSONAL PROPERTY?: NO

## 2024-07-16 SDOH — SOCIAL STABILITY: SOCIAL INSECURITY: ARE YOU OR HAVE YOU BEEN THREATENED OR ABUSED PHYSICALLY, EMOTIONALLY, OR SEXUALLY BY ANYONE?: NO

## 2024-07-16 SDOH — SOCIAL STABILITY: SOCIAL INSECURITY: HAVE YOU HAD ANY THOUGHTS OF HARMING ANYONE ELSE?: NO

## 2024-07-16 SDOH — SOCIAL STABILITY: SOCIAL INSECURITY: ABUSE: ADULT

## 2024-07-16 SDOH — SOCIAL STABILITY: SOCIAL INSECURITY: HAVE YOU HAD THOUGHTS OF HARMING ANYONE ELSE?: NO

## 2024-07-16 SDOH — SOCIAL STABILITY: SOCIAL INSECURITY: DOES ANYONE TRY TO KEEP YOU FROM HAVING/CONTACTING OTHER FRIENDS OR DOING THINGS OUTSIDE YOUR HOME?: NO

## 2024-07-16 SDOH — SOCIAL STABILITY: SOCIAL INSECURITY: WERE YOU ABLE TO COMPLETE ALL THE BEHAVIORAL HEALTH SCREENINGS?: YES

## 2024-07-16 ASSESSMENT — COGNITIVE AND FUNCTIONAL STATUS - GENERAL
MOVING TO AND FROM BED TO CHAIR: A LITTLE
TOILETING: A LITTLE
HELP NEEDED FOR BATHING: A LITTLE
WALKING IN HOSPITAL ROOM: A LITTLE
HELP NEEDED FOR BATHING: A LITTLE
PERSONAL GROOMING: A LITTLE
MOVING TO AND FROM BED TO CHAIR: A LITTLE
DAILY ACTIVITIY SCORE: 19
DRESSING REGULAR UPPER BODY CLOTHING: A LITTLE
CLIMB 3 TO 5 STEPS WITH RAILING: A LITTLE
MOBILITY SCORE: 20
PERSONAL GROOMING: A LITTLE
MOBILITY SCORE: 20
MOBILITY SCORE: 20
TOILETING: A LITTLE
TOILETING: A LITTLE
PATIENT BASELINE BEDBOUND: NO
DRESSING REGULAR UPPER BODY CLOTHING: A LITTLE
DRESSING REGULAR LOWER BODY CLOTHING: A LITTLE
CLIMB 3 TO 5 STEPS WITH RAILING: A LITTLE
MOVING TO AND FROM BED TO CHAIR: A LITTLE
WALKING IN HOSPITAL ROOM: A LITTLE
STANDING UP FROM CHAIR USING ARMS: A LITTLE
STANDING UP FROM CHAIR USING ARMS: A LITTLE
DRESSING REGULAR UPPER BODY CLOTHING: A LITTLE
WALKING IN HOSPITAL ROOM: A LITTLE
DAILY ACTIVITIY SCORE: 19
DRESSING REGULAR LOWER BODY CLOTHING: A LITTLE
PERSONAL GROOMING: A LITTLE
CLIMB 3 TO 5 STEPS WITH RAILING: A LITTLE
DRESSING REGULAR LOWER BODY CLOTHING: A LITTLE
STANDING UP FROM CHAIR USING ARMS: A LITTLE
DAILY ACTIVITIY SCORE: 19
HELP NEEDED FOR BATHING: A LITTLE

## 2024-07-16 ASSESSMENT — ACTIVITIES OF DAILY LIVING (ADL)
TOILETING: INDEPENDENT
HEARING - LEFT EAR: FUNCTIONAL
ADEQUATE_TO_COMPLETE_ADL: YES
LACK_OF_TRANSPORTATION: PATIENT DECLINED
BATHING: INDEPENDENT
GROOMING: INDEPENDENT
FEEDING YOURSELF: INDEPENDENT
DRESSING YOURSELF: INDEPENDENT
PATIENT'S MEMORY ADEQUATE TO SAFELY COMPLETE DAILY ACTIVITIES?: YES
JUDGMENT_ADEQUATE_SAFELY_COMPLETE_DAILY_ACTIVITIES: YES
HEARING - RIGHT EAR: FUNCTIONAL
WALKS IN HOME: INDEPENDENT
LACK_OF_TRANSPORTATION: PATIENT DECLINED

## 2024-07-16 ASSESSMENT — PAIN - FUNCTIONAL ASSESSMENT
PAIN_FUNCTIONAL_ASSESSMENT: 0-10

## 2024-07-16 ASSESSMENT — ENCOUNTER SYMPTOMS
SHORTNESS OF BREATH: 0
PALPITATIONS: 0
ACTIVITY CHANGE: 1
DIARRHEA: 1
BACK PAIN: 0
EYE REDNESS: 0
DYSURIA: 0
ABDOMINAL PAIN: 1
UNEXPECTED WEIGHT CHANGE: 1
CONFUSION: 1
SINUS PRESSURE: 0
HEADACHES: 0
POLYDIPSIA: 0
NAUSEA: 1
APPETITE CHANGE: 1
COUGH: 0
DIZZINESS: 0
EYE ITCHING: 0
VOMITING: 0
MYALGIAS: 0

## 2024-07-16 ASSESSMENT — PAIN SCALES - GENERAL
PAINLEVEL_OUTOF10: 0 - NO PAIN
PAINLEVEL_OUTOF10: 8

## 2024-07-16 ASSESSMENT — PATIENT HEALTH QUESTIONNAIRE - PHQ9
SUM OF ALL RESPONSES TO PHQ9 QUESTIONS 1 & 2: 0
1. LITTLE INTEREST OR PLEASURE IN DOING THINGS: NOT AT ALL
2. FEELING DOWN, DEPRESSED OR HOPELESS: NOT AT ALL

## 2024-07-16 ASSESSMENT — LIFESTYLE VARIABLES
HOW OFTEN DO YOU HAVE 6 OR MORE DRINKS ON ONE OCCASION: NEVER
AUDIT-C TOTAL SCORE: 0
HOW OFTEN DO YOU HAVE A DRINK CONTAINING ALCOHOL: NEVER
SKIP TO QUESTIONS 9-10: 1
AUDIT-C TOTAL SCORE: 0
HOW MANY STANDARD DRINKS CONTAINING ALCOHOL DO YOU HAVE ON A TYPICAL DAY: PATIENT DOES NOT DRINK

## 2024-07-16 ASSESSMENT — PAIN DESCRIPTION - DESCRIPTORS: DESCRIPTORS: ACHING;SHARP
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
## 2024-07-16 NOTE — ED NOTES
Pt's neighbor Manisha Guajardo asked that we keep her up to date on pt transfer, her number is [phone]. Pt is in agreement with this POC.      Floresita Wang RN  07/15/24 [id number]

## 2024-07-16 NOTE — PROGRESS NOTES
07/16/24 1521   Discharge Planning   Living Arrangements Alone   Support Systems Friends/neighbors;Children  (Has a daughter that lives in [place])   Assistance Needed Alert and oriented x 3, Independent with ADL's, Doesn't drive,(car is not working)  No PCP, No DME used at home.   Type of Residence Private residence   Number of Stairs to Enter Residence 0   Number of Stairs Within Residence 24   Do you have animals or pets at home? No   Who is requesting discharge planning? Provider   Home or Post Acute Services None   Expected Discharge Disposition Home   Does the patient need discharge transport arranged? No   RoundTrip coordination needed? No   Financial Resource Strain   How hard is it for you to pay for the very basics like food, housing, medical care, and heating? Pt Declined   Housing Stability   In the last 12 months, was there a time when you were not able to pay the mortgage or rent on time? Pt Declined   In the past 12 months, how many times have you moved where you were living? 1   At any time in the past 12 months, were you homeless or living in a shelter (including now)? Pt Declined   Transportation Needs   In the past 12 months, has lack of transportation kept you from medical appointments or from getting medications? Pt Declined   In the past 12 months, has lack of transportation kept you from meetings, work, or from getting things needed for daily living? Pt Declined   Patient Choice   Provider Choice list and CMS website (https://medicare.gov/care-compare#search) for post-acute Quality and Resource Measure Data were provided and reviewed with: Patient   Patient / Family choosing to utilize agency / facility established prior to hospitalization No
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
## 2024-07-16 NOTE — CARE PLAN
The patient's goals for the shift include no incontinence.      The clinical goals for the shift include No pain    Over the shift, the patient did not make progress toward the following goals. Barriers to progression include pain. Recommendations to address these barriers include none.
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
## 2024-07-17 ENCOUNTER — APPOINTMENT (OUTPATIENT)
Dept: CARDIOLOGY | Facility: HOSPITAL | Age: 79
End: 2024-07-17
Payer: MEDICARE

## 2024-07-17 PROBLEM — R10.9 INTRACTABLE ABDOMINAL PAIN: Status: RESOLVED | Noted: 2024-07-16 | Resolved: 2024-07-17

## 2024-07-17 LAB
AMMONIA PLAS-SCNC: 22 UMOL/L (ref 16–53)
ANION GAP SERPL CALC-SCNC: 11 MMOL/L (ref 10–20)
ATRIAL RATE: 300 BPM
BACTERIA UR CULT: NORMAL
BUN SERPL-MCNC: 10 MG/DL (ref 6–23)
C COLI+JEJ+UPSA DNA STL QL NAA+PROBE: NOT DETECTED
C DIF TOX TCDA+TCDB STL QL NAA+PROBE: NOT DETECTED
CALCIUM SERPL-MCNC: 8 MG/DL (ref 8.6–10.3)
CHLORIDE SERPL-SCNC: 99 MMOL/L (ref 98–107)
CO2 SERPL-SCNC: 30 MMOL/L (ref 21–32)
CREAT SERPL-MCNC: 0.88 MG/DL (ref 0.5–1.3)
EC STX1 GENE STL QL NAA+PROBE: NOT DETECTED
EC STX2 GENE STL QL NAA+PROBE: NOT DETECTED
EGFRCR SERPLBLD CKD-EPI 2021: 87 ML/MIN/1.73M*2
ERYTHROCYTE [DISTWIDTH] IN BLOOD BY AUTOMATED COUNT: 13.6 % (ref 11.5–14.5)
GLUCOSE SERPL-MCNC: 123 MG/DL (ref 74–99)
HCT VFR BLD AUTO: 45.3 % (ref 41–52)
HGB BLD-MCNC: 14.5 G/DL (ref 13.5–17.5)
HOLD SPECIMEN: NORMAL
MCH RBC QN AUTO: 34.3 PG (ref 26–34)
MCHC RBC AUTO-ENTMCNC: 32 G/DL (ref 32–36)
MCV RBC AUTO: 107 FL (ref 80–100)
NOROVIRUS GI + GII RNA STL NAA+PROBE: NOT DETECTED
NRBC BLD-RTO: 0 /100 WBCS (ref 0–0)
P AXIS: 21 DEGREES
P OFFSET: 202 MS
P ONSET: 158 MS
PLATELET # BLD AUTO: 89 X10*3/UL (ref 150–450)
POTASSIUM SERPL-SCNC: 4.1 MMOL/L (ref 3.5–5.3)
Q ONSET: 224 MS
QRS COUNT: 13 BEATS
QRS DURATION: 144 MS
QT INTERVAL: 418 MS
QTC CALCULATION(BAZETT): 479 MS
QTC FREDERICIA: 458 MS
R AXIS: 60 DEGREES
RBC # BLD AUTO: 4.23 X10*6/UL (ref 4.5–5.9)
RV RNA STL NAA+PROBE: NOT DETECTED
SALMONELLA DNA STL QL NAA+PROBE: NOT DETECTED
SHIGELLA DNA SPEC QL NAA+PROBE: NOT DETECTED
SODIUM SERPL-SCNC: 136 MMOL/L (ref 136–145)
T AXIS: 53 DEGREES
T OFFSET: 433 MS
V CHOLERAE DNA STL QL NAA+PROBE: NOT DETECTED
VENTRICULAR RATE: 79 BPM
WBC # BLD AUTO: 3.5 X10*3/UL (ref 4.4–11.3)
Y ENTEROCOL DNA STL QL NAA+PROBE: NOT DETECTED

## 2024-07-17 PROCEDURE — S4991 NICOTINE PATCH NONLEGEND: HCPCS | Performed by: NURSE PRACTITIONER

## 2024-07-17 PROCEDURE — C9113 INJ PANTOPRAZOLE SODIUM, VIA: HCPCS | Performed by: NURSE PRACTITIONER

## 2024-07-17 PROCEDURE — 94760 N-INVAS EAR/PLS OXIMETRY 1: CPT

## 2024-07-17 PROCEDURE — 99233 SBSQ HOSP IP/OBS HIGH 50: CPT | Performed by: NURSE PRACTITIONER

## 2024-07-17 PROCEDURE — 2500000001 HC RX 250 WO HCPCS SELF ADMINISTERED DRUGS (ALT 637 FOR MEDICARE OP): Performed by: NURSE PRACTITIONER

## 2024-07-17 PROCEDURE — 2500000002 HC RX 250 W HCPCS SELF ADMINISTERED DRUGS (ALT 637 FOR MEDICARE OP, ALT 636 FOR OP/ED): Performed by: INTERNAL MEDICINE

## 2024-07-17 PROCEDURE — 85027 COMPLETE CBC AUTOMATED: CPT | Performed by: NURSE PRACTITIONER

## 2024-07-17 PROCEDURE — 99232 SBSQ HOSP IP/OBS MODERATE 35: CPT | Performed by: INTERNAL MEDICINE

## 2024-07-17 PROCEDURE — 97530 THERAPEUTIC ACTIVITIES: CPT | Mod: GP

## 2024-07-17 PROCEDURE — 97161 PT EVAL LOW COMPLEX 20 MIN: CPT | Mod: GP

## 2024-07-17 PROCEDURE — 82374 ASSAY BLOOD CARBON DIOXIDE: CPT | Performed by: NURSE PRACTITIONER

## 2024-07-17 PROCEDURE — 99233 SBSQ HOSP IP/OBS HIGH 50: CPT | Performed by: INTERNAL MEDICINE

## 2024-07-17 PROCEDURE — 36415 COLL VENOUS BLD VENIPUNCTURE: CPT | Performed by: NURSE PRACTITIONER

## 2024-07-17 PROCEDURE — 82140 ASSAY OF AMMONIA: CPT | Performed by: NURSE PRACTITIONER

## 2024-07-17 PROCEDURE — 1200000002 HC GENERAL ROOM WITH TELEMETRY DAILY

## 2024-07-17 PROCEDURE — 2500000004 HC RX 250 GENERAL PHARMACY W/ HCPCS (ALT 636 FOR OP/ED): Performed by: NURSE PRACTITIONER

## 2024-07-17 PROCEDURE — 94640 AIRWAY INHALATION TREATMENT: CPT

## 2024-07-17 PROCEDURE — 93005 ELECTROCARDIOGRAM TRACING: CPT

## 2024-07-17 PROCEDURE — 2500000002 HC RX 250 W HCPCS SELF ADMINISTERED DRUGS (ALT 637 FOR MEDICARE OP, ALT 636 FOR OP/ED): Performed by: NURSE PRACTITIONER

## 2024-07-17 RX ORDER — CIPROFLOXACIN 500 MG/1
500 TABLET ORAL 2 TIMES DAILY
Qty: 6 TABLET | Refills: 0 | OUTPATIENT
Start: 2024-07-17 | End: 2024-07-20

## 2024-07-17 RX ORDER — TAMSULOSIN HYDROCHLORIDE 0.4 MG/1
0.4 CAPSULE ORAL DAILY
Qty: 30 CAPSULE | Refills: 0 | Status: ON HOLD | OUTPATIENT
Start: 2024-07-18 | End: 2024-08-17

## 2024-07-17 RX ORDER — QUETIAPINE FUMARATE 25 MG/1
12.5 TABLET, FILM COATED ORAL EVERY 6 HOURS PRN
Status: DISCONTINUED | OUTPATIENT
Start: 2024-07-17 | End: 2024-07-18

## 2024-07-17 RX ORDER — OLANZAPINE 10 MG/2ML
5 INJECTION, POWDER, FOR SOLUTION INTRAMUSCULAR NIGHTLY
Status: DISCONTINUED | OUTPATIENT
Start: 2024-07-17 | End: 2024-07-18

## 2024-07-17 RX ORDER — LORAZEPAM 2 MG/ML
0.5 INJECTION INTRAMUSCULAR ONCE
Status: COMPLETED | OUTPATIENT
Start: 2024-07-17 | End: 2024-07-18

## 2024-07-17 RX ORDER — HYDROXYZINE HYDROCHLORIDE 25 MG/1
25 TABLET, FILM COATED ORAL 3 TIMES DAILY
Status: DISCONTINUED | OUTPATIENT
Start: 2024-07-17 | End: 2024-07-18

## 2024-07-17 RX ORDER — ASPIRIN 81 MG/1
81 TABLET ORAL DAILY
Status: DISCONTINUED | OUTPATIENT
Start: 2024-07-17 | End: 2024-07-20 | Stop reason: HOSPADM

## 2024-07-17 RX ORDER — TAMSULOSIN HYDROCHLORIDE 0.4 MG/1
0.4 CAPSULE ORAL DAILY
Status: DISCONTINUED | OUTPATIENT
Start: 2024-07-17 | End: 2024-07-20 | Stop reason: HOSPADM

## 2024-07-17 RX ORDER — METRONIDAZOLE 500 MG/1
500 TABLET ORAL 3 TIMES DAILY
Qty: 9 TABLET | Refills: 0 | OUTPATIENT
Start: 2024-07-17 | End: 2024-07-20

## 2024-07-17 ASSESSMENT — PAIN - FUNCTIONAL ASSESSMENT
PAIN_FUNCTIONAL_ASSESSMENT: 0-10

## 2024-07-17 ASSESSMENT — PAIN SCALES - GENERAL
PAINLEVEL_OUTOF10: 0 - NO PAIN

## 2024-07-17 ASSESSMENT — COGNITIVE AND FUNCTIONAL STATUS - GENERAL
STANDING UP FROM CHAIR USING ARMS: A LITTLE
HELP NEEDED FOR BATHING: A LITTLE
MOVING TO AND FROM BED TO CHAIR: A LITTLE
CLIMB 3 TO 5 STEPS WITH RAILING: A LITTLE
WALKING IN HOSPITAL ROOM: A LITTLE
DRESSING REGULAR UPPER BODY CLOTHING: A LITTLE
HELP NEEDED FOR BATHING: A LITTLE
PERSONAL GROOMING: A LITTLE
STANDING UP FROM CHAIR USING ARMS: A LITTLE
TOILETING: A LITTLE
MOVING TO AND FROM BED TO CHAIR: A LITTLE
STANDING UP FROM CHAIR USING ARMS: A LITTLE
TOILETING: A LITTLE
CLIMB 3 TO 5 STEPS WITH RAILING: A LITTLE
DRESSING REGULAR LOWER BODY CLOTHING: A LITTLE
MOVING TO AND FROM BED TO CHAIR: A LITTLE
DRESSING REGULAR UPPER BODY CLOTHING: A LITTLE
DAILY ACTIVITIY SCORE: 19
MOBILITY SCORE: 20
PERSONAL GROOMING: A LITTLE
MOBILITY SCORE: 20
DRESSING REGULAR LOWER BODY CLOTHING: A LITTLE
WALKING IN HOSPITAL ROOM: A LITTLE
WALKING IN HOSPITAL ROOM: A LITTLE
CLIMB 3 TO 5 STEPS WITH RAILING: A LITTLE
MOBILITY SCORE: 20
DAILY ACTIVITIY SCORE: 19

## 2024-07-17 ASSESSMENT — ACTIVITIES OF DAILY LIVING (ADL)
LACK_OF_TRANSPORTATION: PATIENT DECLINED
ADL_ASSISTANCE: INDEPENDENT

## 2024-07-17 NOTE — PROGRESS NOTES
07/17/24 1049   Discharge Planning   Living Arrangements Alone   Support Systems Friends/neighbors;Children  (Has a daughter that lives in [place])   Assistance Needed Alert and oriented x 3, Independent with ADL's, Doesn't drive,(car is not working) No PCP, No DME used at home. Pt has [employer] 3 days a week (MWF)   Type of Residence Private residence   Number of Stairs to Enter Residence 0   Number of Stairs Within Residence 24   Do you have animals or pets at home? No   Who is requesting discharge planning? Provider   Home or Post Acute Services None   Expected Discharge Disposition Home  (Patient denies any home going needs. Stated he has all the help he needs with [employer].)   Financial Resource Strain   How hard is it for you to pay for the very basics like food, housing, medical care, and heating? Pt Declined   Housing Stability   In the last 12 months, was there a time when you were not able to pay the mortgage or rent on time? Pt Declined   In the past 12 months, how many times have you moved where you were living? 1   At any time in the past 12 months, were you homeless or living in a shelter (including now)? Pt Declined   Transportation Needs   In the past 12 months, has lack of transportation kept you from medical appointments or from getting medications? Pt Declined   In the past 12 months, has lack of transportation kept you from meetings, work, or from getting things needed for daily living? Pt Declined        07/17/24 1100   Discharge Planning   Expected Discharge Disposition Home  (Patient cleared for discharge today. Patient is aware and denies any home going needs. Neighbor Manisha will transport patient home after 4pm today.)      07/17/24 1500   Discharge Planning   Expected Discharge Disposition Home  (Made aware patient not discharging at this time. Patient is aware of no dc today. Phoned patient's neighbor Manisha and made her aware of no dc and no transport needed.)

## 2024-07-17 NOTE — CARE PLAN
The patient's goals for the shift include  pt will remain safe.    The clinical goals for the shift include pt will remain oriented to self throughout the shift    Over the shift, the patient made progress towards both goals. Pt remained oriented to self, but did have some episodes of confusion. Doctors notified. Pt remained safe throughout shift with bed in lowest position and call light within reach.    Problem: Skin  Goal: Prevent/manage excess moisture  Outcome: Progressing  Goal: Promote/optimize nutrition  Outcome: Progressing     Problem: Pain  Goal: Takes deep breaths with improved pain control throughout the shift  Outcome: Progressing  Goal: Turns in bed with improved pain control throughout the shift  Outcome: Progressing     Problem: Fall/Injury  Goal: Verbalize understanding of personal risk factors for fall in the hospital  Outcome: Progressing
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
## 2024-07-17 NOTE — DISCHARGE INSTRUCTIONS
Please take medication as prescribed.    Continue to metronidazole and Cipro for 3 days.    Someone will call you from urology, vascular surgery, gastroenterology and gerontology, with appointments for follow-up.
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
## 2024-07-17 NOTE — CARE PLAN
The clinical goals for the shift include Patient will have no complaints of pain throughout shift.        Problem: Skin  Goal: Promote skin healing  Outcome: Progressing  Flowsheets (Taken 7/17/2024 5163)  Promote skin healing: Turn/reposition every 2 hours/use positioning/transfer devices     Problem: Pain  Goal: Takes deep breaths with improved pain control throughout the shift  Outcome: Progressing     Problem: Fall/Injury  Goal: Verbalize understanding of personal risk factors for fall in the hospital  Outcome: Progressing

## 2024-07-18 ENCOUNTER — APPOINTMENT (OUTPATIENT)
Dept: RADIOLOGY | Facility: HOSPITAL | Age: 79
End: 2024-07-18
Payer: MEDICARE

## 2024-07-18 LAB
ALBUMIN SERPL BCP-MCNC: 2.8 G/DL (ref 3.4–5)
ALP SERPL-CCNC: 39 U/L (ref 33–136)
ALT SERPL W P-5'-P-CCNC: 14 U/L (ref 10–52)
ANION GAP SERPL CALC-SCNC: 9 MMOL/L (ref 10–20)
AST SERPL W P-5'-P-CCNC: 26 U/L (ref 9–39)
ATRIAL RATE: 78 BPM
BILIRUB DIRECT SERPL-MCNC: 0.3 MG/DL (ref 0–0.3)
BILIRUB SERPL-MCNC: 1 MG/DL (ref 0–1.2)
BUN SERPL-MCNC: 6 MG/DL (ref 6–23)
CALCIUM SERPL-MCNC: 7.7 MG/DL (ref 8.6–10.3)
CHLORIDE SERPL-SCNC: 101 MMOL/L (ref 98–107)
CO2 SERPL-SCNC: 31 MMOL/L (ref 21–32)
CREAT SERPL-MCNC: 0.84 MG/DL (ref 0.5–1.3)
EGFRCR SERPLBLD CKD-EPI 2021: 88 ML/MIN/1.73M*2
ERYTHROCYTE [DISTWIDTH] IN BLOOD BY AUTOMATED COUNT: 13.3 % (ref 11.5–14.5)
FLUAV RNA RESP QL NAA+PROBE: NOT DETECTED
FLUBV RNA RESP QL NAA+PROBE: NOT DETECTED
GLUCOSE SERPL-MCNC: 109 MG/DL (ref 74–99)
HCT VFR BLD AUTO: 39.5 % (ref 41–52)
HGB BLD-MCNC: 13.5 G/DL (ref 13.5–17.5)
MCH RBC QN AUTO: 34.1 PG (ref 26–34)
MCHC RBC AUTO-ENTMCNC: 34.2 G/DL (ref 32–36)
MCV RBC AUTO: 100 FL (ref 80–100)
NRBC BLD-RTO: 0 /100 WBCS (ref 0–0)
P AXIS: 83 DEGREES
P OFFSET: 201 MS
P ONSET: 159 MS
PLATELET # BLD AUTO: 65 X10*3/UL (ref 150–450)
POTASSIUM SERPL-SCNC: 3.7 MMOL/L (ref 3.5–5.3)
PR INTERVAL: 132 MS
PROCALCITONIN SERPL-MCNC: 0.07 NG/ML
PROT SERPL-MCNC: 4.8 G/DL (ref 6.4–8.2)
Q ONSET: 225 MS
QRS COUNT: 13 BEATS
QRS DURATION: 140 MS
QT INTERVAL: 438 MS
QTC CALCULATION(BAZETT): 499 MS
QTC FREDERICIA: 478 MS
R AXIS: 70 DEGREES
RBC # BLD AUTO: 3.96 X10*6/UL (ref 4.5–5.9)
SARS-COV-2 RNA RESP QL NAA+PROBE: NOT DETECTED
SODIUM SERPL-SCNC: 137 MMOL/L (ref 136–145)
T AXIS: 60 DEGREES
T OFFSET: 444 MS
VENTRICULAR RATE: 78 BPM
WBC # BLD AUTO: 2.4 X10*3/UL (ref 4.4–11.3)

## 2024-07-18 PROCEDURE — 70551 MRI BRAIN STEM W/O DYE: CPT | Performed by: RADIOLOGY

## 2024-07-18 PROCEDURE — 36415 COLL VENOUS BLD VENIPUNCTURE: CPT | Performed by: NURSE PRACTITIONER

## 2024-07-18 PROCEDURE — 87040 BLOOD CULTURE FOR BACTERIA: CPT | Mod: GEALAB | Performed by: NURSE PRACTITIONER

## 2024-07-18 PROCEDURE — 2500000001 HC RX 250 WO HCPCS SELF ADMINISTERED DRUGS (ALT 637 FOR MEDICARE OP): Performed by: NURSE PRACTITIONER

## 2024-07-18 PROCEDURE — 99222 1ST HOSP IP/OBS MODERATE 55: CPT | Performed by: PSYCHIATRY & NEUROLOGY

## 2024-07-18 PROCEDURE — 82248 BILIRUBIN DIRECT: CPT | Performed by: INTERNAL MEDICINE

## 2024-07-18 PROCEDURE — 2500000004 HC RX 250 GENERAL PHARMACY W/ HCPCS (ALT 636 FOR OP/ED): Performed by: NURSE PRACTITIONER

## 2024-07-18 PROCEDURE — 2500000002 HC RX 250 W HCPCS SELF ADMINISTERED DRUGS (ALT 637 FOR MEDICARE OP, ALT 636 FOR OP/ED): Performed by: INTERNAL MEDICINE

## 2024-07-18 PROCEDURE — 1200000002 HC GENERAL ROOM WITH TELEMETRY DAILY

## 2024-07-18 PROCEDURE — 99233 SBSQ HOSP IP/OBS HIGH 50: CPT | Performed by: NURSE PRACTITIONER

## 2024-07-18 PROCEDURE — 80048 BASIC METABOLIC PNL TOTAL CA: CPT | Performed by: NURSE PRACTITIONER

## 2024-07-18 PROCEDURE — 94760 N-INVAS EAR/PLS OXIMETRY 1: CPT

## 2024-07-18 PROCEDURE — 70450 CT HEAD/BRAIN W/O DYE: CPT | Performed by: RADIOLOGY

## 2024-07-18 PROCEDURE — 70551 MRI BRAIN STEM W/O DYE: CPT

## 2024-07-18 PROCEDURE — 87636 SARSCOV2 & INF A&B AMP PRB: CPT | Performed by: NURSE PRACTITIONER

## 2024-07-18 PROCEDURE — 70450 CT HEAD/BRAIN W/O DYE: CPT

## 2024-07-18 PROCEDURE — 85027 COMPLETE CBC AUTOMATED: CPT | Performed by: NURSE PRACTITIONER

## 2024-07-18 PROCEDURE — 84145 PROCALCITONIN (PCT): CPT | Mod: GEALAB | Performed by: NURSE PRACTITIONER

## 2024-07-18 RX ORDER — HYDROXYZINE HYDROCHLORIDE 25 MG/1
25 TABLET, FILM COATED ORAL EVERY 8 HOURS PRN
Status: DISCONTINUED | OUTPATIENT
Start: 2024-07-18 | End: 2024-07-20 | Stop reason: HOSPADM

## 2024-07-18 RX ORDER — DEXTROSE MONOHYDRATE, SODIUM CHLORIDE, AND POTASSIUM CHLORIDE 50; 1.49; 4.5 G/1000ML; G/1000ML; G/1000ML
50 INJECTION, SOLUTION INTRAVENOUS CONTINUOUS
Status: DISPENSED | OUTPATIENT
Start: 2024-07-18 | End: 2024-07-19

## 2024-07-18 RX ORDER — ACETAMINOPHEN 500 MG
5 TABLET ORAL NIGHTLY
Status: DISCONTINUED | OUTPATIENT
Start: 2024-07-18 | End: 2024-07-20 | Stop reason: HOSPADM

## 2024-07-18 RX ORDER — OLANZAPINE 5 MG/1
2.5 TABLET ORAL EVERY 6 HOURS PRN
Status: DISCONTINUED | OUTPATIENT
Start: 2024-07-18 | End: 2024-07-20 | Stop reason: HOSPADM

## 2024-07-18 RX ORDER — PANTOPRAZOLE SODIUM 40 MG/1
40 TABLET, DELAYED RELEASE ORAL
Status: DISCONTINUED | OUTPATIENT
Start: 2024-07-18 | End: 2024-07-20 | Stop reason: HOSPADM

## 2024-07-18 RX ORDER — OLANZAPINE 10 MG/2ML
2.5 INJECTION, POWDER, FOR SOLUTION INTRAMUSCULAR EVERY 6 HOURS PRN
Status: DISCONTINUED | OUTPATIENT
Start: 2024-07-18 | End: 2024-07-20 | Stop reason: HOSPADM

## 2024-07-18 ASSESSMENT — COGNITIVE AND FUNCTIONAL STATUS - GENERAL
TOILETING: A LITTLE
TOILETING: A LITTLE
PERSONAL GROOMING: A LITTLE
HELP NEEDED FOR BATHING: A LITTLE
MOVING TO AND FROM BED TO CHAIR: A LITTLE
STANDING UP FROM CHAIR USING ARMS: A LOT
WALKING IN HOSPITAL ROOM: A LITTLE
MOBILITY SCORE: 19
CLIMB 3 TO 5 STEPS WITH RAILING: A LITTLE
CLIMB 3 TO 5 STEPS WITH RAILING: A LOT
STANDING UP FROM CHAIR USING ARMS: A LOT
MOVING TO AND FROM BED TO CHAIR: A LITTLE
DRESSING REGULAR LOWER BODY CLOTHING: A LITTLE
DRESSING REGULAR LOWER BODY CLOTHING: A LITTLE
DAILY ACTIVITIY SCORE: 20
PERSONAL GROOMING: A LITTLE
MOBILITY SCORE: 17
WALKING IN HOSPITAL ROOM: A LOT
HELP NEEDED FOR BATHING: A LITTLE
DAILY ACTIVITIY SCORE: 20

## 2024-07-18 ASSESSMENT — PAIN - FUNCTIONAL ASSESSMENT
PAIN_FUNCTIONAL_ASSESSMENT: 0-10
PAIN_FUNCTIONAL_ASSESSMENT: 0-10

## 2024-07-18 ASSESSMENT — PAIN SCALES - GENERAL
PAINLEVEL_OUTOF10: 0 - NO PAIN
PAINLEVEL_OUTOF10: 0 - NO PAIN
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
## 2024-07-18 NOTE — PROGRESS NOTES
07/18/24 1026   Discharge Planning   Assistance Needed ALANA called and spoke with pt neighbor Manisha who was not able to find phone number for pt dtr. Manisha does not know the city they dtr lives in or is sure of last name of dtr. Manisha did give SW phone number for pt brother Benja Jaffe [phone]. ALANA spoke with Benja and confirmed that he is brother of pt. ALANA updated pt demographic information and added Benja as contact. Benja states that he and pt talk approximately once a week but that he did not want to visit pt at one point due to pt not taking care of bed bugs inside of home. Benja states that pt's dtr Thao Smart will call and speak with pt approximately once every 3-4 months. Benja did not know the phone number for pt dtr either. Benja stated that he can be called if anything additional is needed related to pt. ALANA updated TCC.

## 2024-07-18 NOTE — CARE PLAN
The patient's goals for the shift include      The clinical goals for the shift include patient get rest during shift      Problem: Skin  Goal: Participates in plan/prevention/treatment measures  Outcome: Progressing  Flowsheets (Taken 7/17/2024 2240)  Participates in plan/prevention/treatment measures: Elevate heels     Problem: Skin  Goal: Prevent/manage excess moisture  Outcome: Progressing  Flowsheets (Taken 7/17/2024 2240)  Prevent/manage excess moisture: Cleanse incontinence/protect with barrier cream     Problem: Skin  Goal: Prevent/minimize sheer/friction injuries  Outcome: Progressing  Flowsheets (Taken 7/17/2024 2240)  Prevent/minimize sheer/friction injuries: Increase activity/out of bed for meals     Problem: Skin  Goal: Promote/optimize nutrition  Outcome: Progressing  Flowsheets (Taken 7/17/2024 2240)  Promote/optimize nutrition: Consume > 50% meals/supplements
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
## 2024-07-18 NOTE — CARE PLAN
The patient's goals for the shift include  pt will remain safe.     The clinical goals for the shift include pt will remain oriented to self throughout shift    Over the shift, the patient made progress towards both goals. Pt remained oriented to self throughout the shift. Pt still having intermittent confusion throughout shift. Reoriented pt per need. Pt visiting with his neighbors at his bedside. Pt remained safe throughout shift with bed in lowest position and call light within reach.     Problem: Skin  Goal: Participates in plan/prevention/treatment measures  Outcome: Progressing  Goal: Prevent/manage excess moisture  Outcome: Progressing  Goal: Prevent/minimize sheer/friction injuries  Outcome: Progressing  Goal: Promote/optimize nutrition  Outcome: Progressing  Goal: Promote skin healing  Outcome: Progressing     Problem: Pain  Goal: Turns in bed with improved pain control throughout the shift  Outcome: Progressing

## 2024-07-19 LAB
ALBUMIN SERPL BCP-MCNC: 3.2 G/DL (ref 3.4–5)
AMMONIA PLAS-SCNC: 18 UMOL/L (ref 16–53)
ANION GAP SERPL CALC-SCNC: 10 MMOL/L (ref 10–20)
BASOPHILS # BLD AUTO: 0.02 X10*3/UL (ref 0–0.1)
BASOPHILS NFR BLD AUTO: 0.6 %
BUN SERPL-MCNC: 8 MG/DL (ref 6–23)
CALCIUM SERPL-MCNC: 7.9 MG/DL (ref 8.6–10.3)
CHLORIDE SERPL-SCNC: 99 MMOL/L (ref 98–107)
CO2 SERPL-SCNC: 31 MMOL/L (ref 21–32)
CREAT SERPL-MCNC: 0.97 MG/DL (ref 0.5–1.3)
CRYPTOSP AG STL QL IA: NEGATIVE
EGFRCR SERPLBLD CKD-EPI 2021: 79 ML/MIN/1.73M*2
EOSINOPHIL # BLD AUTO: 0.12 X10*3/UL (ref 0–0.4)
EOSINOPHIL NFR BLD AUTO: 3.4 %
ERYTHROCYTE [DISTWIDTH] IN BLOOD BY AUTOMATED COUNT: 13.4 % (ref 11.5–14.5)
G LAMBLIA AG STL QL IA: NEGATIVE
GLUCOSE SERPL-MCNC: 94 MG/DL (ref 74–99)
HCT VFR BLD AUTO: 41.9 % (ref 41–52)
HGB BLD-MCNC: 14.4 G/DL (ref 13.5–17.5)
IMM GRANULOCYTES # BLD AUTO: 0.01 X10*3/UL (ref 0–0.5)
IMM GRANULOCYTES NFR BLD AUTO: 0.3 % (ref 0–0.9)
LYMPHOCYTES # BLD AUTO: 0.94 X10*3/UL (ref 0.8–3)
LYMPHOCYTES NFR BLD AUTO: 26.4 %
MAGNESIUM SERPL-MCNC: 1.38 MG/DL (ref 1.6–2.4)
MCH RBC QN AUTO: 34.2 PG (ref 26–34)
MCHC RBC AUTO-ENTMCNC: 34.4 G/DL (ref 32–36)
MCV RBC AUTO: 100 FL (ref 80–100)
MONOCYTES # BLD AUTO: 0.36 X10*3/UL (ref 0.05–0.8)
MONOCYTES NFR BLD AUTO: 10.1 %
NEUTROPHILS # BLD AUTO: 2.11 X10*3/UL (ref 1.6–5.5)
NEUTROPHILS NFR BLD AUTO: 59.2 %
NRBC BLD-RTO: 0 /100 WBCS (ref 0–0)
PHOSPHATE SERPL-MCNC: 2.2 MG/DL (ref 2.5–4.9)
PLATELET # BLD AUTO: 77 X10*3/UL (ref 150–450)
POTASSIUM SERPL-SCNC: 4.1 MMOL/L (ref 3.5–5.3)
PROCALCITONIN SERPL-MCNC: 0.32 NG/ML
RBC # BLD AUTO: 4.21 X10*6/UL (ref 4.5–5.9)
SODIUM SERPL-SCNC: 136 MMOL/L (ref 136–145)
WBC # BLD AUTO: 3.6 X10*3/UL (ref 4.4–11.3)

## 2024-07-19 PROCEDURE — 2500000005 HC RX 250 GENERAL PHARMACY W/O HCPCS: Performed by: NURSE PRACTITIONER

## 2024-07-19 PROCEDURE — 1200000002 HC GENERAL ROOM WITH TELEMETRY DAILY

## 2024-07-19 PROCEDURE — 83735 ASSAY OF MAGNESIUM: CPT | Performed by: INTERNAL MEDICINE

## 2024-07-19 PROCEDURE — 2500000002 HC RX 250 W HCPCS SELF ADMINISTERED DRUGS (ALT 637 FOR MEDICARE OP, ALT 636 FOR OP/ED): Performed by: INTERNAL MEDICINE

## 2024-07-19 PROCEDURE — 36415 COLL VENOUS BLD VENIPUNCTURE: CPT | Performed by: INTERNAL MEDICINE

## 2024-07-19 PROCEDURE — 82140 ASSAY OF AMMONIA: CPT | Performed by: INTERNAL MEDICINE

## 2024-07-19 PROCEDURE — 2500000004 HC RX 250 GENERAL PHARMACY W/ HCPCS (ALT 636 FOR OP/ED): Performed by: NURSE PRACTITIONER

## 2024-07-19 PROCEDURE — 80069 RENAL FUNCTION PANEL: CPT | Performed by: INTERNAL MEDICINE

## 2024-07-19 PROCEDURE — 94640 AIRWAY INHALATION TREATMENT: CPT

## 2024-07-19 PROCEDURE — 94760 N-INVAS EAR/PLS OXIMETRY 1: CPT

## 2024-07-19 PROCEDURE — 2500000001 HC RX 250 WO HCPCS SELF ADMINISTERED DRUGS (ALT 637 FOR MEDICARE OP): Performed by: NURSE PRACTITIONER

## 2024-07-19 PROCEDURE — 99233 SBSQ HOSP IP/OBS HIGH 50: CPT | Performed by: NURSE PRACTITIONER

## 2024-07-19 PROCEDURE — 85025 COMPLETE CBC W/AUTO DIFF WBC: CPT | Performed by: INTERNAL MEDICINE

## 2024-07-19 PROCEDURE — 2500000001 HC RX 250 WO HCPCS SELF ADMINISTERED DRUGS (ALT 637 FOR MEDICARE OP): Performed by: INTERNAL MEDICINE

## 2024-07-19 PROCEDURE — 2500000004 HC RX 250 GENERAL PHARMACY W/ HCPCS (ALT 636 FOR OP/ED): Performed by: INTERNAL MEDICINE

## 2024-07-19 PROCEDURE — 84145 PROCALCITONIN (PCT): CPT | Mod: GEALAB | Performed by: INTERNAL MEDICINE

## 2024-07-19 RX ORDER — MAGNESIUM SULFATE HEPTAHYDRATE 40 MG/ML
2 INJECTION, SOLUTION INTRAVENOUS ONCE
Status: COMPLETED | OUTPATIENT
Start: 2024-07-19 | End: 2024-07-19

## 2024-07-19 RX ORDER — OLANZAPINE 5 MG/1
5 TABLET ORAL NIGHTLY
Status: DISCONTINUED | OUTPATIENT
Start: 2024-07-19 | End: 2024-07-20 | Stop reason: HOSPADM

## 2024-07-19 ASSESSMENT — PAIN SCALES - GENERAL
PAINLEVEL_OUTOF10: 0 - NO PAIN

## 2024-07-19 ASSESSMENT — PAIN - FUNCTIONAL ASSESSMENT
PAIN_FUNCTIONAL_ASSESSMENT: 0-10
PAIN_FUNCTIONAL_ASSESSMENT: 0-10

## 2024-07-19 ASSESSMENT — COGNITIVE AND FUNCTIONAL STATUS - GENERAL
HELP NEEDED FOR BATHING: A LITTLE
TOILETING: A LITTLE
DRESSING REGULAR LOWER BODY CLOTHING: A LITTLE
DAILY ACTIVITIY SCORE: 19
CLIMB 3 TO 5 STEPS WITH RAILING: A LITTLE
WALKING IN HOSPITAL ROOM: A LITTLE
MOBILITY SCORE: 21
DRESSING REGULAR UPPER BODY CLOTHING: A LITTLE
PERSONAL GROOMING: A LITTLE
STANDING UP FROM CHAIR USING ARMS: A LITTLE

## 2024-07-19 NOTE — PROGRESS NOTES
07/19/24 1522   Discharge Planning   Living Arrangements Alone   Support Systems Children;Family members;Friends/neighbors   Assistance Needed Alert and oriented x 3, Independent with ADL's, Doesn't drive,(car is not working) No PCP, No DME used at home. Pt has [employer] 3 days a week (MWF)   Type of Residence Private residence   Number of Stairs to Enter Residence 0   Number of Stairs Within Residence 24   Do you have animals or pets at home? No   Who is requesting discharge planning? Provider   Home or Post Acute Services Other (Comment)  (TBD/ PT/OT to re-evaluate the patient due to a decline in patient's mobility)   Expected Discharge Disposition   (TBD PT/OT re-evaluations pending)   Does the patient need discharge transport arranged? Yes   RoundTrip coordination needed? Yes   Has discharge transport been arranged? No   Patient Choice   Provider Choice list and CMS website (https://medicare.gov/care-compare#search) for post-acute Quality and Resource Measure Data were provided and reviewed with: Patient   Patient / Family choosing to utilize agency / facility established prior to hospitalization No

## 2024-07-19 NOTE — CARE PLAN
The patient's goals for the shift include  safety    The clinical goals for the shift include pt will remain oriented to self throughout shift    Problem: Skin  Goal: Participates in plan/prevention/treatment measures  Outcome: Progressing  Goal: Prevent/manage excess moisture  Outcome: Progressing  Goal: Prevent/minimize sheer/friction injuries  Outcome: Progressing  Goal: Promote/optimize nutrition  Outcome: Progressing  Goal: Promote skin healing  Outcome: Progressing     Problem: Pain  Goal: Takes deep breaths with improved pain control throughout the shift  Outcome: Progressing  Goal: Turns in bed with improved pain control throughout the shift  Outcome: Progressing  Goal: Participates in PT with improved pain control throughout the shift  Outcome: Progressing  Goal: Free from acute confusion related to pain meds throughout the shift  Outcome: Progressing     Problem: Fall/Injury  Goal: Verbalize understanding of personal risk factors for fall in the hospital  Outcome: Progressing  Goal: Verbalize understanding of risk factor reduction measures to prevent injury from fall in the home  Outcome: Progressing  Goal: Use assistive devices by end of the shift  Outcome: Progressing  Goal: Pace activities to prevent fatigue by end of the shift  Outcome: Progressing
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
## 2024-07-19 NOTE — CARE PLAN
The patient's goals for the shift include      The clinical goals for the shift include Pt will remain safe throughout shift.      Problem: Skin  Goal: Participates in plan/prevention/treatment measures  Outcome: Progressing  Goal: Prevent/manage excess moisture  Outcome: Progressing  Goal: Prevent/minimize sheer/friction injuries  Outcome: Progressing  Goal: Promote/optimize nutrition  Outcome: Progressing  Goal: Promote skin healing  Outcome: Progressing     Problem: Pain  Goal: Takes deep breaths with improved pain control throughout the shift  Outcome: Progressing  Goal: Turns in bed with improved pain control throughout the shift  Outcome: Progressing  Goal: Participates in PT with improved pain control throughout the shift  Outcome: Progressing  Goal: Free from acute confusion related to pain meds throughout the shift  Outcome: Progressing     Problem: Fall/Injury  Goal: Verbalize understanding of personal risk factors for fall in the hospital  Outcome: Progressing  Goal: Verbalize understanding of risk factor reduction measures to prevent injury from fall in the home  Outcome: Progressing  Goal: Use assistive devices by end of the shift  Outcome: Progressing  Goal: Pace activities to prevent fatigue by end of the shift  Outcome: Progressing     Problem: Pain - Adult  Goal: Verbalizes/displays adequate comfort level or baseline comfort level  Outcome: Progressing     Problem: Safety - Adult  Goal: Free from fall injury  Outcome: Progressing     Problem: Discharge Planning  Goal: Discharge to home or other facility with appropriate resources  Outcome: Progressing     Problem: Chronic Conditions and Co-morbidities  Goal: Patient's chronic conditions and co-morbidity symptoms are monitored and maintained or improved  Outcome: Progressing

## 2024-07-20 VITALS
SYSTOLIC BLOOD PRESSURE: 137 MMHG | HEART RATE: 89 BPM | RESPIRATION RATE: 18 BRPM | WEIGHT: 151.1 LBS | BODY MASS INDEX: 23.72 KG/M2 | TEMPERATURE: 96.8 F | OXYGEN SATURATION: 96 % | HEIGHT: 67 IN | DIASTOLIC BLOOD PRESSURE: 73 MMHG

## 2024-07-20 LAB
ALBUMIN SERPL BCP-MCNC: 3.1 G/DL (ref 3.4–5)
ALP SERPL-CCNC: 41 U/L (ref 33–136)
ALT SERPL W P-5'-P-CCNC: 15 U/L (ref 10–52)
AMMONIA PLAS-SCNC: 16 UMOL/L (ref 16–53)
ANION GAP SERPL CALC-SCNC: 10 MMOL/L (ref 10–20)
AST SERPL W P-5'-P-CCNC: 24 U/L (ref 9–39)
BASOPHILS # BLD AUTO: 0.02 X10*3/UL (ref 0–0.1)
BASOPHILS NFR BLD AUTO: 0.6 %
BILIRUB DIRECT SERPL-MCNC: 0.2 MG/DL (ref 0–0.3)
BILIRUB SERPL-MCNC: 1 MG/DL (ref 0–1.2)
BUN SERPL-MCNC: 9 MG/DL (ref 6–23)
CALCIUM SERPL-MCNC: 8.1 MG/DL (ref 8.6–10.3)
CHLORIDE SERPL-SCNC: 99 MMOL/L (ref 98–107)
CO2 SERPL-SCNC: 34 MMOL/L (ref 21–32)
CREAT SERPL-MCNC: 0.82 MG/DL (ref 0.5–1.3)
EGFRCR SERPLBLD CKD-EPI 2021: 89 ML/MIN/1.73M*2
EOSINOPHIL # BLD AUTO: 0.1 X10*3/UL (ref 0–0.4)
EOSINOPHIL NFR BLD AUTO: 3.1 %
ERYTHROCYTE [DISTWIDTH] IN BLOOD BY AUTOMATED COUNT: 13.4 % (ref 11.5–14.5)
GLUCOSE BLD MANUAL STRIP-MCNC: 86 MG/DL (ref 74–99)
GLUCOSE SERPL-MCNC: 68 MG/DL (ref 74–99)
HCT VFR BLD AUTO: 38.9 % (ref 41–52)
HGB BLD-MCNC: 13.5 G/DL (ref 13.5–17.5)
IMM GRANULOCYTES # BLD AUTO: 0 X10*3/UL (ref 0–0.5)
IMM GRANULOCYTES NFR BLD AUTO: 0 % (ref 0–0.9)
LYMPHOCYTES # BLD AUTO: 1.08 X10*3/UL (ref 0.8–3)
LYMPHOCYTES NFR BLD AUTO: 33.8 %
MCH RBC QN AUTO: 35 PG (ref 26–34)
MCHC RBC AUTO-ENTMCNC: 34.7 G/DL (ref 32–36)
MCV RBC AUTO: 101 FL (ref 80–100)
MONOCYTES # BLD AUTO: 0.34 X10*3/UL (ref 0.05–0.8)
MONOCYTES NFR BLD AUTO: 10.6 %
NEUTROPHILS # BLD AUTO: 1.66 X10*3/UL (ref 1.6–5.5)
NEUTROPHILS NFR BLD AUTO: 51.9 %
NRBC BLD-RTO: 0 /100 WBCS (ref 0–0)
O+P STL MICRO: NEGATIVE
PHOSPHATE SERPL-MCNC: 2.6 MG/DL (ref 2.5–4.9)
PLATELET # BLD AUTO: 72 X10*3/UL (ref 150–450)
POTASSIUM SERPL-SCNC: 4.2 MMOL/L (ref 3.5–5.3)
PROT SERPL-MCNC: 5.4 G/DL (ref 6.4–8.2)
RBC # BLD AUTO: 3.86 X10*6/UL (ref 4.5–5.9)
SODIUM SERPL-SCNC: 139 MMOL/L (ref 136–145)
WBC # BLD AUTO: 3.2 X10*3/UL (ref 4.4–11.3)

## 2024-07-20 PROCEDURE — 80076 HEPATIC FUNCTION PANEL: CPT | Performed by: INTERNAL MEDICINE

## 2024-07-20 PROCEDURE — 2500000002 HC RX 250 W HCPCS SELF ADMINISTERED DRUGS (ALT 637 FOR MEDICARE OP, ALT 636 FOR OP/ED): Performed by: INTERNAL MEDICINE

## 2024-07-20 PROCEDURE — 2500000001 HC RX 250 WO HCPCS SELF ADMINISTERED DRUGS (ALT 637 FOR MEDICARE OP): Performed by: INTERNAL MEDICINE

## 2024-07-20 PROCEDURE — 80053 COMPREHEN METABOLIC PANEL: CPT | Performed by: INTERNAL MEDICINE

## 2024-07-20 PROCEDURE — 94760 N-INVAS EAR/PLS OXIMETRY 1: CPT

## 2024-07-20 PROCEDURE — 82140 ASSAY OF AMMONIA: CPT | Performed by: INTERNAL MEDICINE

## 2024-07-20 PROCEDURE — 97165 OT EVAL LOW COMPLEX 30 MIN: CPT | Mod: GO

## 2024-07-20 PROCEDURE — 2500000004 HC RX 250 GENERAL PHARMACY W/ HCPCS (ALT 636 FOR OP/ED): Performed by: NURSE PRACTITIONER

## 2024-07-20 PROCEDURE — 84145 PROCALCITONIN (PCT): CPT | Mod: GEALAB | Performed by: INTERNAL MEDICINE

## 2024-07-20 PROCEDURE — 94640 AIRWAY INHALATION TREATMENT: CPT

## 2024-07-20 PROCEDURE — 2500000005 HC RX 250 GENERAL PHARMACY W/O HCPCS: Performed by: NURSE PRACTITIONER

## 2024-07-20 PROCEDURE — 36415 COLL VENOUS BLD VENIPUNCTURE: CPT | Performed by: INTERNAL MEDICINE

## 2024-07-20 PROCEDURE — 2500000001 HC RX 250 WO HCPCS SELF ADMINISTERED DRUGS (ALT 637 FOR MEDICARE OP): Performed by: NURSE PRACTITIONER

## 2024-07-20 PROCEDURE — 85025 COMPLETE CBC W/AUTO DIFF WBC: CPT | Performed by: INTERNAL MEDICINE

## 2024-07-20 PROCEDURE — 97164 PT RE-EVAL EST PLAN CARE: CPT | Mod: GP

## 2024-07-20 PROCEDURE — 84100 ASSAY OF PHOSPHORUS: CPT | Performed by: INTERNAL MEDICINE

## 2024-07-20 PROCEDURE — 82947 ASSAY GLUCOSE BLOOD QUANT: CPT

## 2024-07-20 RX ORDER — PANTOPRAZOLE SODIUM 40 MG/1
40 TABLET, DELAYED RELEASE ORAL
Qty: 30 TABLET | Refills: 0 | Status: ON HOLD | OUTPATIENT
Start: 2024-07-21 | End: 2024-08-20

## 2024-07-20 RX ORDER — CIPROFLOXACIN 500 MG/1
500 TABLET ORAL 2 TIMES DAILY
Qty: 6 TABLET | Refills: 0 | Status: ON HOLD | OUTPATIENT
Start: 2024-07-20 | End: 2024-07-23

## 2024-07-20 RX ORDER — OLANZAPINE 5 MG/1
5 TABLET ORAL NIGHTLY
Qty: 30 TABLET | Refills: 0 | Status: ON HOLD | OUTPATIENT
Start: 2024-07-20 | End: 2024-08-19

## 2024-07-20 RX ORDER — HYDROXYZINE HYDROCHLORIDE 25 MG/1
25 TABLET, FILM COATED ORAL
Qty: 60 TABLET | Refills: 0 | Status: ON HOLD | OUTPATIENT
Start: 2024-07-20 | End: 2024-08-19

## 2024-07-20 RX ORDER — METRONIDAZOLE 500 MG/1
500 TABLET ORAL 3 TIMES DAILY
Qty: 9 TABLET | Refills: 0 | Status: ON HOLD | OUTPATIENT
Start: 2024-07-20 | End: 2024-07-23

## 2024-07-20 ASSESSMENT — COGNITIVE AND FUNCTIONAL STATUS - GENERAL
CLIMB 3 TO 5 STEPS WITH RAILING: A LITTLE
MOBILITY SCORE: 21
WALKING IN HOSPITAL ROOM: A LITTLE
DAILY ACTIVITIY SCORE: 21
HELP NEEDED FOR BATHING: A LITTLE
STANDING UP FROM CHAIR USING ARMS: A LITTLE
TOILETING: A LITTLE
DRESSING REGULAR LOWER BODY CLOTHING: A LITTLE

## 2024-07-20 ASSESSMENT — PAIN - FUNCTIONAL ASSESSMENT
PAIN_FUNCTIONAL_ASSESSMENT: 0-10

## 2024-07-20 ASSESSMENT — PAIN SCALES - GENERAL
PAINLEVEL_OUTOF10: 0 - NO PAIN

## 2024-07-20 ASSESSMENT — ACTIVITIES OF DAILY LIVING (ADL)
ADL_ASSISTANCE: INDEPENDENT
BATHING_ASSISTANCE: STAND BY

## 2024-07-20 NOTE — PROGRESS NOTES
07/20/24 1100   Discharge Planning   Expected Discharge Disposition Home  (Patient dcing today and is fine with dc-IMM obtained. Pt denies any home going needs. Refused home care)

## 2024-07-20 NOTE — PROGRESS NOTES
07/20/24 1049   Discharge Planning   Living Arrangements Alone   Support Systems Children;Family members;Friends/neighbors   Assistance Needed Pt will discharge home today, he declines HHC.  Talita, Manisha to provide transportaion home.   Type of Residence Private residence   Do you have animals or pets at home? No   Who is requesting discharge planning? Provider   Expected Discharge Disposition Home

## 2024-07-20 NOTE — CARE PLAN
Problem: Fall/Injury  Goal: Verbalize understanding of personal risk factors for fall in the hospital  Outcome: Progressing  Goal: Verbalize understanding of risk factor reduction measures to prevent injury from fall in the home  Outcome: Progressing  Goal: Use assistive devices by end of the shift  Outcome: Progressing     Problem: Pain - Adult  Goal: Verbalizes/displays adequate comfort level or baseline comfort level  Outcome: Progressing     Problem: Safety - Adult  Goal: Free from fall injury  Outcome: Progressing     Problem: Discharge Planning  Goal: Discharge to home or other facility with appropriate resources  Outcome: Progressing   The patient's goals for the shift include NAVIN     The clinical goals for the shift include patient will remain safe with bed alarm activated this shift    Over the shift, the patient did not make progress toward the following goals. Barriers to progression include confusion at times, restlessness. Recommendations to address these barriers include sleep hygiene, reorientation, assistance with ambulation, oxygen, medication administration.

## 2024-07-20 NOTE — CARE PLAN
The clinical goals for the shift include wean oxygen  Pt weaned off oxygen and is 94% on RA. Pt to be discharging home.    Problem: Skin  Goal: Prevent/manage excess moisture  Outcome: Progressing  Flowsheets (Taken 7/20/2024 1208)  Prevent/manage excess moisture: Cleanse incontinence/protect with barrier cream  Goal: Prevent/minimize sheer/friction injuries  Outcome: Progressing  Flowsheets (Taken 7/20/2024 1208)  Prevent/minimize sheer/friction injuries:   Increase activity/out of bed for meals   HOB 30 degrees or less  Goal: Promote skin healing  Outcome: Progressing     Problem: Fall/Injury  Goal: Verbalize understanding of personal risk factors for fall in the hospital  Outcome: Progressing
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
## 2024-07-21 LAB
BACTERIA BLD CULT: NORMAL
BACTERIA BLD CULT: NORMAL
PROCALCITONIN SERPL-MCNC: 0.07 NG/ML

## 2024-07-22 LAB
BACTERIA BLD CULT: NORMAL
BACTERIA BLD CULT: NORMAL

## 2024-07-23 ENCOUNTER — APPOINTMENT (OUTPATIENT)
Dept: RADIOLOGY | Facility: HOSPITAL | Age: 79
End: 2024-07-23
Payer: MEDICARE

## 2024-07-23 ENCOUNTER — HOSPITAL ENCOUNTER (INPATIENT)
Facility: HOSPITAL | Age: 79
End: 2024-07-23
Attending: EMERGENCY MEDICINE | Admitting: INTERNAL MEDICINE
Payer: MEDICARE

## 2024-07-23 ENCOUNTER — APPOINTMENT (OUTPATIENT)
Dept: CARDIOLOGY | Facility: HOSPITAL | Age: 79
End: 2024-07-23
Payer: MEDICARE

## 2024-07-23 DIAGNOSIS — F01.A11 MILD VASCULAR DEMENTIA WITH AGITATION (MULTI): ICD-10-CM

## 2024-07-23 DIAGNOSIS — R93.5 ABNORMAL CT OF THE ABDOMEN: ICD-10-CM

## 2024-07-23 DIAGNOSIS — J44.1 COPD EXACERBATION (MULTI): ICD-10-CM

## 2024-07-23 DIAGNOSIS — R10.13 EPIGASTRIC PAIN: Primary | ICD-10-CM

## 2024-07-23 DIAGNOSIS — K74.60 CIRRHOSIS OF LIVER WITHOUT ASCITES, UNSPECIFIED HEPATIC CIRRHOSIS TYPE (MULTI): ICD-10-CM

## 2024-07-23 DIAGNOSIS — K29.80 DUODENITIS: ICD-10-CM

## 2024-07-23 DIAGNOSIS — J44.1 COPD WITH ACUTE EXACERBATION (MULTI): ICD-10-CM

## 2024-07-23 DIAGNOSIS — F41.9 ANXIETY: ICD-10-CM

## 2024-07-23 DIAGNOSIS — I10 PRIMARY HYPERTENSION: ICD-10-CM

## 2024-07-23 DIAGNOSIS — R09.02 HYPOXIA: ICD-10-CM

## 2024-07-23 LAB
ALBUMIN SERPL BCP-MCNC: 3.5 G/DL (ref 3.4–5)
ALP SERPL-CCNC: 45 U/L (ref 33–136)
ALT SERPL W P-5'-P-CCNC: 17 U/L (ref 10–52)
ANION GAP SERPL CALC-SCNC: 12 MMOL/L (ref 10–20)
APPEARANCE UR: CLEAR
AST SERPL W P-5'-P-CCNC: 23 U/L (ref 9–39)
ATRIAL RATE: 91 BPM
BASOPHILS # BLD AUTO: 0.03 X10*3/UL (ref 0–0.1)
BASOPHILS NFR BLD AUTO: 0.9 %
BILIRUB SERPL-MCNC: 0.8 MG/DL (ref 0–1.2)
BILIRUB UR STRIP.AUTO-MCNC: NEGATIVE MG/DL
BNP SERPL-MCNC: 200 PG/ML (ref 0–99)
BUN SERPL-MCNC: 19 MG/DL (ref 6–23)
CALCIUM SERPL-MCNC: 8.6 MG/DL (ref 8.6–10.3)
CARDIAC TROPONIN I PNL SERPL HS: 31 NG/L (ref 0–20)
CARDIAC TROPONIN I PNL SERPL HS: 39 NG/L (ref 0–20)
CHLORIDE SERPL-SCNC: 102 MMOL/L (ref 98–107)
CO2 SERPL-SCNC: 30 MMOL/L (ref 21–32)
COLOR UR: YELLOW
CREAT SERPL-MCNC: 0.85 MG/DL (ref 0.5–1.3)
EGFRCR SERPLBLD CKD-EPI 2021: 88 ML/MIN/1.73M*2
EOSINOPHIL # BLD AUTO: 0.09 X10*3/UL (ref 0–0.4)
EOSINOPHIL NFR BLD AUTO: 2.6 %
ERYTHROCYTE [DISTWIDTH] IN BLOOD BY AUTOMATED COUNT: 14.2 % (ref 11.5–14.5)
GLUCOSE SERPL-MCNC: 111 MG/DL (ref 74–99)
GLUCOSE UR STRIP.AUTO-MCNC: NORMAL MG/DL
HCT VFR BLD AUTO: 40.4 % (ref 41–52)
HGB BLD-MCNC: 13.7 G/DL (ref 13.5–17.5)
IMM GRANULOCYTES # BLD AUTO: 0.01 X10*3/UL (ref 0–0.5)
IMM GRANULOCYTES NFR BLD AUTO: 0.3 % (ref 0–0.9)
KETONES UR STRIP.AUTO-MCNC: NEGATIVE MG/DL
LACTATE SERPL-SCNC: 1.2 MMOL/L (ref 0.4–2)
LACTATE SERPL-SCNC: 2.5 MMOL/L (ref 0.4–2)
LEUKOCYTE ESTERASE UR QL STRIP.AUTO: ABNORMAL
LYMPHOCYTES # BLD AUTO: 1.14 X10*3/UL (ref 0.8–3)
LYMPHOCYTES NFR BLD AUTO: 32.9 %
MCH RBC QN AUTO: 34.9 PG (ref 26–34)
MCHC RBC AUTO-ENTMCNC: 33.9 G/DL (ref 32–36)
MCV RBC AUTO: 103 FL (ref 80–100)
MONOCYTES # BLD AUTO: 0.44 X10*3/UL (ref 0.05–0.8)
MONOCYTES NFR BLD AUTO: 12.7 %
MUCOUS THREADS #/AREA URNS AUTO: ABNORMAL /LPF
NEUTROPHILS # BLD AUTO: 1.75 X10*3/UL (ref 1.6–5.5)
NEUTROPHILS NFR BLD AUTO: 50.6 %
NITRITE UR QL STRIP.AUTO: NEGATIVE
NRBC BLD-RTO: 0 /100 WBCS (ref 0–0)
OVALOCYTES BLD QL SMEAR: NORMAL
P AXIS: 28 DEGREES
P OFFSET: 207 MS
P ONSET: 161 MS
PH UR STRIP.AUTO: 6.5 [PH]
PLATELET # BLD AUTO: 111 X10*3/UL (ref 150–450)
POTASSIUM SERPL-SCNC: 3.7 MMOL/L (ref 3.5–5.3)
PR INTERVAL: 128 MS
PROT SERPL-MCNC: 6.2 G/DL (ref 6.4–8.2)
PROT UR STRIP.AUTO-MCNC: NEGATIVE MG/DL
Q ONSET: 225 MS
QRS COUNT: 15 BEATS
QRS DURATION: 140 MS
QT INTERVAL: 396 MS
QTC CALCULATION(BAZETT): 487 MS
QTC FREDERICIA: 455 MS
R AXIS: 62 DEGREES
RBC # BLD AUTO: 3.93 X10*6/UL (ref 4.5–5.9)
RBC # UR STRIP.AUTO: ABNORMAL /UL
RBC #/AREA URNS AUTO: ABNORMAL /HPF
RBC MORPH BLD: NORMAL
SCHISTOCYTES BLD QL SMEAR: NORMAL
SODIUM SERPL-SCNC: 140 MMOL/L (ref 136–145)
SP GR UR STRIP.AUTO: 1.02
SQUAMOUS #/AREA URNS AUTO: ABNORMAL /HPF
T AXIS: -75 DEGREES
T OFFSET: 423 MS
UROBILINOGEN UR STRIP.AUTO-MCNC: NORMAL MG/DL
VENTRICULAR RATE: 91 BPM
WBC # BLD AUTO: 3.5 X10*3/UL (ref 4.4–11.3)
WBC #/AREA URNS AUTO: ABNORMAL /HPF
YEAST BUDDING #/AREA UR COMP ASSIST: PRESENT /HPF
YEAST HYPHAE #/AREA UR COMP ASSIST: PRESENT /HPF

## 2024-07-23 PROCEDURE — 2500000004 HC RX 250 GENERAL PHARMACY W/ HCPCS (ALT 636 FOR OP/ED): Performed by: EMERGENCY MEDICINE

## 2024-07-23 PROCEDURE — 87086 URINE CULTURE/COLONY COUNT: CPT | Mod: GENLAB | Performed by: EMERGENCY MEDICINE

## 2024-07-23 PROCEDURE — 2500000002 HC RX 250 W HCPCS SELF ADMINISTERED DRUGS (ALT 637 FOR MEDICARE OP, ALT 636 FOR OP/ED): Performed by: NURSE PRACTITIONER

## 2024-07-23 PROCEDURE — 2550000001 HC RX 255 CONTRASTS: Performed by: EMERGENCY MEDICINE

## 2024-07-23 PROCEDURE — 83880 ASSAY OF NATRIURETIC PEPTIDE: CPT | Performed by: EMERGENCY MEDICINE

## 2024-07-23 PROCEDURE — 96361 HYDRATE IV INFUSION ADD-ON: CPT

## 2024-07-23 PROCEDURE — G0378 HOSPITAL OBSERVATION PER HR: HCPCS

## 2024-07-23 PROCEDURE — 96375 TX/PRO/DX INJ NEW DRUG ADDON: CPT

## 2024-07-23 PROCEDURE — 4500999001 HC ED NO CHARGE

## 2024-07-23 PROCEDURE — 2500000004 HC RX 250 GENERAL PHARMACY W/ HCPCS (ALT 636 FOR OP/ED)

## 2024-07-23 PROCEDURE — 71250 CT THORAX DX C-: CPT

## 2024-07-23 PROCEDURE — 36415 COLL VENOUS BLD VENIPUNCTURE: CPT | Performed by: EMERGENCY MEDICINE

## 2024-07-23 PROCEDURE — 2500000004 HC RX 250 GENERAL PHARMACY W/ HCPCS (ALT 636 FOR OP/ED): Performed by: NURSE PRACTITIONER

## 2024-07-23 PROCEDURE — 74174 CTA ABD&PLVS W/CONTRAST: CPT

## 2024-07-23 PROCEDURE — 94640 AIRWAY INHALATION TREATMENT: CPT

## 2024-07-23 PROCEDURE — 85025 COMPLETE CBC W/AUTO DIFF WBC: CPT | Performed by: EMERGENCY MEDICINE

## 2024-07-23 PROCEDURE — 94664 DEMO&/EVAL PT USE INHALER: CPT

## 2024-07-23 PROCEDURE — 94760 N-INVAS EAR/PLS OXIMETRY 1: CPT

## 2024-07-23 PROCEDURE — 93005 ELECTROCARDIOGRAM TRACING: CPT

## 2024-07-23 PROCEDURE — 9420000001 HC RT PATIENT EDUCATION 5 MIN

## 2024-07-23 PROCEDURE — 93005 ELECTROCARDIOGRAM TRACING: CPT | Mod: IPSPLIT

## 2024-07-23 PROCEDURE — 96372 THER/PROPH/DIAG INJ SC/IM: CPT | Performed by: NURSE PRACTITIONER

## 2024-07-23 PROCEDURE — 83605 ASSAY OF LACTIC ACID: CPT | Performed by: EMERGENCY MEDICINE

## 2024-07-23 PROCEDURE — 74174 CTA ABD&PLVS W/CONTRAST: CPT | Mod: FOREIGN READ | Performed by: RADIOLOGY

## 2024-07-23 PROCEDURE — 84484 ASSAY OF TROPONIN QUANT: CPT | Performed by: EMERGENCY MEDICINE

## 2024-07-23 PROCEDURE — 2500000002 HC RX 250 W HCPCS SELF ADMINISTERED DRUGS (ALT 637 FOR MEDICARE OP, ALT 636 FOR OP/ED): Performed by: EMERGENCY MEDICINE

## 2024-07-23 PROCEDURE — 81001 URINALYSIS AUTO W/SCOPE: CPT | Performed by: EMERGENCY MEDICINE

## 2024-07-23 PROCEDURE — 71250 CT THORAX DX C-: CPT | Mod: FOREIGN READ | Performed by: RADIOLOGY

## 2024-07-23 PROCEDURE — 99285 EMERGENCY DEPT VISIT HI MDM: CPT | Mod: 25

## 2024-07-23 PROCEDURE — 87040 BLOOD CULTURE FOR BACTERIA: CPT | Mod: GENLAB | Performed by: EMERGENCY MEDICINE

## 2024-07-23 PROCEDURE — 80053 COMPREHEN METABOLIC PANEL: CPT | Performed by: EMERGENCY MEDICINE

## 2024-07-23 RX ORDER — ACETAMINOPHEN 325 MG/1
650 TABLET ORAL EVERY 4 HOURS PRN
Status: DISCONTINUED | OUTPATIENT
Start: 2024-07-23 | End: 2024-07-30 | Stop reason: HOSPADM

## 2024-07-23 RX ORDER — HYDROXYZINE HYDROCHLORIDE 25 MG/1
25 TABLET, FILM COATED ORAL
Status: DISCONTINUED | OUTPATIENT
Start: 2024-07-24 | End: 2024-07-29

## 2024-07-23 RX ORDER — OLANZAPINE 5 MG/1
5 TABLET ORAL NIGHTLY
Status: DISCONTINUED | OUTPATIENT
Start: 2024-07-23 | End: 2024-07-30 | Stop reason: HOSPADM

## 2024-07-23 RX ORDER — TAMSULOSIN HYDROCHLORIDE 0.4 MG/1
0.4 CAPSULE ORAL DAILY
Status: DISCONTINUED | OUTPATIENT
Start: 2024-07-24 | End: 2024-07-30 | Stop reason: HOSPADM

## 2024-07-23 RX ORDER — POLYETHYLENE GLYCOL 3350 17 G/17G
17 POWDER, FOR SOLUTION ORAL DAILY PRN
Status: DISCONTINUED | OUTPATIENT
Start: 2024-07-23 | End: 2024-07-30 | Stop reason: HOSPADM

## 2024-07-23 RX ORDER — SODIUM CHLORIDE 9 MG/ML
INJECTION, SOLUTION INTRAVENOUS
Status: COMPLETED
Start: 2024-07-23 | End: 2024-07-23

## 2024-07-23 RX ORDER — ENOXAPARIN SODIUM 100 MG/ML
40 INJECTION SUBCUTANEOUS EVERY 24 HOURS
Status: DISCONTINUED | OUTPATIENT
Start: 2024-07-23 | End: 2024-07-30 | Stop reason: HOSPADM

## 2024-07-23 RX ORDER — PANTOPRAZOLE SODIUM 40 MG/1
40 TABLET, DELAYED RELEASE ORAL
Status: DISCONTINUED | OUTPATIENT
Start: 2024-07-24 | End: 2024-07-30 | Stop reason: HOSPADM

## 2024-07-23 RX ORDER — ALBUTEROL SULFATE 0.83 MG/ML
2.5 SOLUTION RESPIRATORY (INHALATION) EVERY 2 HOUR PRN
Status: DISCONTINUED | OUTPATIENT
Start: 2024-07-23 | End: 2024-07-30 | Stop reason: HOSPADM

## 2024-07-23 RX ORDER — IPRATROPIUM BROMIDE AND ALBUTEROL SULFATE 2.5; .5 MG/3ML; MG/3ML
3 SOLUTION RESPIRATORY (INHALATION)
Status: DISCONTINUED | OUTPATIENT
Start: 2024-07-23 | End: 2024-07-23

## 2024-07-23 RX ORDER — ALBUTEROL SULFATE 0.83 MG/ML
2.5 SOLUTION RESPIRATORY (INHALATION) ONCE
Status: COMPLETED | OUTPATIENT
Start: 2024-07-23 | End: 2024-07-23

## 2024-07-23 RX ORDER — SUCRALFATE 1 G/10ML
1 SUSPENSION ORAL EVERY 6 HOURS SCHEDULED
Status: DISCONTINUED | OUTPATIENT
Start: 2024-07-23 | End: 2024-07-30 | Stop reason: HOSPADM

## 2024-07-23 RX ORDER — ONDANSETRON HYDROCHLORIDE 2 MG/ML
4 INJECTION, SOLUTION INTRAVENOUS EVERY 8 HOURS PRN
Status: DISCONTINUED | OUTPATIENT
Start: 2024-07-23 | End: 2024-07-30 | Stop reason: HOSPADM

## 2024-07-23 RX ORDER — IPRATROPIUM BROMIDE AND ALBUTEROL SULFATE 2.5; .5 MG/3ML; MG/3ML
3 SOLUTION RESPIRATORY (INHALATION) 3 TIMES DAILY
Status: DISCONTINUED | OUTPATIENT
Start: 2024-07-24 | End: 2024-07-24

## 2024-07-23 RX ORDER — ASPIRIN 81 MG/1
81 TABLET ORAL DAILY
Status: DISCONTINUED | OUTPATIENT
Start: 2024-07-24 | End: 2024-07-30 | Stop reason: HOSPADM

## 2024-07-23 RX ORDER — IPRATROPIUM BROMIDE AND ALBUTEROL SULFATE 2.5; .5 MG/3ML; MG/3ML
6 SOLUTION RESPIRATORY (INHALATION) ONCE
Status: COMPLETED | OUTPATIENT
Start: 2024-07-23 | End: 2024-07-23

## 2024-07-23 SDOH — SOCIAL STABILITY: SOCIAL INSECURITY: WERE YOU ABLE TO COMPLETE ALL THE BEHAVIORAL HEALTH SCREENINGS?: NO

## 2024-07-23 ASSESSMENT — ACTIVITIES OF DAILY LIVING (ADL)
FEEDING YOURSELF: INDEPENDENT
TOILETING: NEEDS ASSISTANCE
WALKS IN HOME: INDEPENDENT
PATIENT'S MEMORY ADEQUATE TO SAFELY COMPLETE DAILY ACTIVITIES?: YES
GROOMING: NEEDS ASSISTANCE
JUDGMENT_ADEQUATE_SAFELY_COMPLETE_DAILY_ACTIVITIES: NO
LACK_OF_TRANSPORTATION: PATIENT DECLINED
HEARING - LEFT EAR: FUNCTIONAL
ADEQUATE_TO_COMPLETE_ADL: YES
HEARING - RIGHT EAR: FUNCTIONAL
BATHING: NEEDS ASSISTANCE
DRESSING YOURSELF: NEEDS ASSISTANCE

## 2024-07-23 ASSESSMENT — COLUMBIA-SUICIDE SEVERITY RATING SCALE - C-SSRS
1. IN THE PAST MONTH, HAVE YOU WISHED YOU WERE DEAD OR WISHED YOU COULD GO TO SLEEP AND NOT WAKE UP?: NO
6. HAVE YOU EVER DONE ANYTHING, STARTED TO DO ANYTHING, OR PREPARED TO DO ANYTHING TO END YOUR LIFE?: NO
2. HAVE YOU ACTUALLY HAD ANY THOUGHTS OF KILLING YOURSELF?: NO

## 2024-07-23 ASSESSMENT — COGNITIVE AND FUNCTIONAL STATUS - GENERAL
HELP NEEDED FOR BATHING: A LOT
TURNING FROM BACK TO SIDE WHILE IN FLAT BAD: A LOT
DAILY ACTIVITIY SCORE: 18
STANDING UP FROM CHAIR USING ARMS: A LOT
PERSONAL GROOMING: A LITTLE
MOVING FROM LYING ON BACK TO SITTING ON SIDE OF FLAT BED WITH BEDRAILS: A LITTLE
MOBILITY SCORE: 12
MOVING TO AND FROM BED TO CHAIR: A LOT
PATIENT BASELINE BEDBOUND: NO
DRESSING REGULAR LOWER BODY CLOTHING: A LITTLE
DRESSING REGULAR UPPER BODY CLOTHING: A LITTLE
WALKING IN HOSPITAL ROOM: A LOT
CLIMB 3 TO 5 STEPS WITH RAILING: TOTAL
TOILETING: A LITTLE

## 2024-07-23 ASSESSMENT — PAIN SCALES - GENERAL
PAINLEVEL_OUTOF10: 6
PAINLEVEL_OUTOF10: 8

## 2024-07-23 ASSESSMENT — PAIN DESCRIPTION - LOCATION: LOCATION: BACK

## 2024-07-23 ASSESSMENT — PAIN - FUNCTIONAL ASSESSMENT: PAIN_FUNCTIONAL_ASSESSMENT: 0-10

## 2024-07-23 NOTE — ED PROVIDER NOTES
HPI   Chief Complaint   Patient presents with    Abdominal Pain     Pt c/o abdominal pain whenever he eats, was at [facility] recently but left AMA       HPI        Patient History   Past Medical History:   Diagnosis Date    Personal history of other (healed) physical injury and trauma     History of head injury    Personal history of other diseases of the digestive system 10/07/2013    History of esophageal reflux     Past Surgical History:   Procedure Laterality Date    MR HEAD ANGIO WO IV CONTRAST  2/25/2015    MR HEAD ANGIO WO IV CONTRAST 2/25/2015 [facility] INPATIENT LEGACY    ROTATOR CUFF REPAIR  03/25/2013    Rotator Cuff Repair     No family history on file.  Social History     Tobacco Use    Smoking status: Every Day     Current packs/day: 1.00     Types: Cigarettes    Smokeless tobacco: Never   Substance Use Topics    Alcohol use: Not on file    Drug use: Not on file       Physical Exam   ED Triage Vitals   Temperature Heart Rate Respirations BP   07/23/24 1153 07/23/24 1153 07/23/24 1200 07/23/24 1153   36.7 °C (98 °F) 92 19 121/74      Pulse Ox Temp src Heart Rate Source Patient Position   07/23/24 1153 -- 07/23/24 1153 07/23/24 1153   (!) 92 %  Monitor Lying      BP Location FiO2 (%)     07/23/24 1153 07/23/24 1640     Right arm 21 %       Physical Exam  Vitals and nursing note reviewed.   Constitutional:       General: He is not in acute distress.     Appearance: He is well-developed.   HENT:      Head: Normocephalic and atraumatic.   Eyes:      Conjunctiva/sclera: Conjunctivae normal.   Cardiovascular:      Rate and Rhythm: Normal rate and regular rhythm.      Heart sounds: No murmur heard.  Pulmonary:      Effort: Pulmonary effort is normal. No respiratory distress.      Breath sounds: Normal breath sounds.   Abdominal:      Palpations: Abdomen is soft.      Tenderness: There is no abdominal tenderness.   Musculoskeletal:         General: No swelling.      Cervical back: Neck supple.   Skin:      General: Skin is warm and dry.      Capillary Refill: Capillary refill takes less than 2 seconds.   Neurological:      Mental Status: He is alert.   Psychiatric:         Mood and Affect: Mood normal.           ED Course & MDM   ED Course as of 07/23/24 1811 Tue Jul 23, 2024   1159 EKG performed at 1159 showing ST depression normal sinus rhythm ventricular rate of 91 interpreted by me will get an EKG to interpret the changes [KA]   1221 Compared to the old EKG from the 16th.  Has some ischemic changes on the lateral leads currently reaching out to cardiology waiting to hear back from Dr. Ely. [KA]      ED Course User Index  [KA] Jakob Butler, DO         Diagnoses as of 07/23/24 1811   COPD with acute exacerbation (Multi)   Hypoxia                       Oneyda Coma Scale Score: 15                        Medical Decision Making  79-year-old gentleman recently admitted to the hospital for mesenteric ischemia.  Patient had a call back phone call from the hospital and he said that he still having some abdominal pain.  Patient arrived here with no abdominal pain.  Patient is not well kept appearance.  Physically appears to be short of breath patient pulse ox was 87%.  Placed the patient on oxygen given 2 L.  Very diminished breath sounds.  No abdominal tenderness on exam however with the recent diagnosis did do a CT angiogram.  Full workup including imaging and blood work.  Including troponins.  His troponins are decreasing.  Breathing treatments slightly helped the patient but he is still hypoxic.  The CT angio did not show any emergent blockage today.  Plan is to admit the patient for his COPD exacerbation in addition to that patient does have some social aspects that need assistance.  He does not have any home health his neighbor who helps take care of him she is unable to take care of her anymore patient is willing to have home health come and take care of him.  Patient is agreeable to stay in the hospital for  further evaluation and treatment.  Mason noting the lactate is negative.        Procedure  Procedures     Jakob Hay, DO  07/23/24 1816

## 2024-07-23 NOTE — Clinical Note
ED  Recommendation: ED- Rec. Obs (Admitting placed OBS order) (07/23/24 1654 : Soraya Fletcher RN)

## 2024-07-24 PROBLEM — K74.60 CIRRHOSIS OF LIVER WITHOUT ASCITES (MULTI): Chronic | Status: ACTIVE | Noted: 2024-07-24

## 2024-07-24 PROBLEM — N40.0 BENIGN PROSTATIC HYPERPLASIA WITHOUT LOWER URINARY TRACT SYMPTOMS: Status: ACTIVE | Noted: 2024-07-24

## 2024-07-24 PROBLEM — N30.00 ACUTE CYSTITIS WITHOUT HEMATURIA: Status: ACTIVE | Noted: 2024-07-24

## 2024-07-24 PROBLEM — J44.1 COPD WITH ACUTE EXACERBATION (MULTI): Status: ACTIVE | Noted: 2024-07-24

## 2024-07-24 PROBLEM — R10.84 GENERALIZED ABDOMINAL PAIN: Status: ACTIVE | Noted: 2024-07-24

## 2024-07-24 PROBLEM — D61.818 PANCYTOPENIA (MULTI): Status: ACTIVE | Noted: 2024-07-24

## 2024-07-24 PROBLEM — F17.200 TOBACCO USE DISORDER: Status: ACTIVE | Noted: 2024-07-24

## 2024-07-24 PROBLEM — F03.90 DEMENTIA (MULTI): Status: ACTIVE | Noted: 2024-07-24

## 2024-07-24 PROBLEM — F33.9 RECURRENT MAJOR DEPRESSIVE DISORDER (CMS-HCC): Status: ACTIVE | Noted: 2024-07-24

## 2024-07-24 PROBLEM — J96.01 ACUTE HYPOXIC RESPIRATORY FAILURE (MULTI): Status: ACTIVE | Noted: 2024-07-24

## 2024-07-24 PROBLEM — K55.1 SUPERIOR MESENTERIC ARTERY STENOSIS (MULTI): Status: ACTIVE | Noted: 2024-07-24

## 2024-07-24 PROBLEM — F41.9 ANXIETY: Status: ACTIVE | Noted: 2024-07-24

## 2024-07-24 LAB
ANION GAP SERPL CALC-SCNC: 7 MMOL/L (ref 10–20)
ATRIAL RATE: 86 BPM
BUN SERPL-MCNC: 15 MG/DL (ref 6–23)
CALCIUM SERPL-MCNC: 8.3 MG/DL (ref 8.6–10.3)
CHLORIDE SERPL-SCNC: 104 MMOL/L (ref 98–107)
CO2 SERPL-SCNC: 34 MMOL/L (ref 21–32)
CREAT SERPL-MCNC: 0.71 MG/DL (ref 0.5–1.3)
EGFRCR SERPLBLD CKD-EPI 2021: >90 ML/MIN/1.73M*2
ERYTHROCYTE [DISTWIDTH] IN BLOOD BY AUTOMATED COUNT: 14.1 % (ref 11.5–14.5)
GLUCOSE SERPL-MCNC: 207 MG/DL (ref 74–99)
HCT VFR BLD AUTO: 40.1 % (ref 41–52)
HGB BLD-MCNC: 13.3 G/DL (ref 13.5–17.5)
HOLD SPECIMEN: NORMAL
LIPASE SERPL-CCNC: 16 U/L (ref 9–82)
MCH RBC QN AUTO: 35 PG (ref 26–34)
MCHC RBC AUTO-ENTMCNC: 33.2 G/DL (ref 32–36)
MCV RBC AUTO: 106 FL (ref 80–100)
NRBC BLD-RTO: ABNORMAL /100{WBCS}
P AXIS: -6 DEGREES
P OFFSET: 200 MS
P ONSET: 152 MS
PLATELET # BLD AUTO: 85 X10*3/UL (ref 150–450)
POTASSIUM SERPL-SCNC: 4.4 MMOL/L (ref 3.5–5.3)
PR INTERVAL: 146 MS
Q ONSET: 225 MS
QRS COUNT: 14 BEATS
QRS DURATION: 136 MS
QT INTERVAL: 404 MS
QTC CALCULATION(BAZETT): 483 MS
QTC FREDERICIA: 455 MS
R AXIS: -20 DEGREES
RBC # BLD AUTO: 3.8 X10*6/UL (ref 4.5–5.9)
SODIUM SERPL-SCNC: 141 MMOL/L (ref 136–145)
T AXIS: -83 DEGREES
T OFFSET: 427 MS
VENTRICULAR RATE: 86 BPM
WBC # BLD AUTO: 2.2 X10*3/UL (ref 4.4–11.3)

## 2024-07-24 PROCEDURE — 2500000005 HC RX 250 GENERAL PHARMACY W/O HCPCS: Performed by: NURSE PRACTITIONER

## 2024-07-24 PROCEDURE — 99285 EMERGENCY DEPT VISIT HI MDM: CPT

## 2024-07-24 PROCEDURE — 2500000002 HC RX 250 W HCPCS SELF ADMINISTERED DRUGS (ALT 637 FOR MEDICARE OP, ALT 636 FOR OP/ED): Performed by: NURSE PRACTITIONER

## 2024-07-24 PROCEDURE — 36415 COLL VENOUS BLD VENIPUNCTURE: CPT | Performed by: NURSE PRACTITIONER

## 2024-07-24 PROCEDURE — 2500000002 HC RX 250 W HCPCS SELF ADMINISTERED DRUGS (ALT 637 FOR MEDICARE OP, ALT 636 FOR OP/ED)

## 2024-07-24 PROCEDURE — 2500000001 HC RX 250 WO HCPCS SELF ADMINISTERED DRUGS (ALT 637 FOR MEDICARE OP): Performed by: NURSE PRACTITIONER

## 2024-07-24 PROCEDURE — 85027 COMPLETE CBC AUTOMATED: CPT | Performed by: NURSE PRACTITIONER

## 2024-07-24 PROCEDURE — 94760 N-INVAS EAR/PLS OXIMETRY 1: CPT

## 2024-07-24 PROCEDURE — S4991 NICOTINE PATCH NONLEGEND: HCPCS

## 2024-07-24 PROCEDURE — 2500000001 HC RX 250 WO HCPCS SELF ADMINISTERED DRUGS (ALT 637 FOR MEDICARE OP)

## 2024-07-24 PROCEDURE — 99223 1ST HOSP IP/OBS HIGH 75: CPT

## 2024-07-24 PROCEDURE — 99222 1ST HOSP IP/OBS MODERATE 55: CPT

## 2024-07-24 PROCEDURE — 1200000002 HC GENERAL ROOM WITH TELEMETRY DAILY: Mod: IPSPLIT

## 2024-07-24 PROCEDURE — 80048 BASIC METABOLIC PNL TOTAL CA: CPT | Performed by: NURSE PRACTITIONER

## 2024-07-24 PROCEDURE — 2500000004 HC RX 250 GENERAL PHARMACY W/ HCPCS (ALT 636 FOR OP/ED): Mod: IPSPLIT | Performed by: NURSE PRACTITIONER

## 2024-07-24 PROCEDURE — 83690 ASSAY OF LIPASE: CPT | Mod: IPSPLIT | Performed by: SURGERY

## 2024-07-24 PROCEDURE — 2500000001 HC RX 250 WO HCPCS SELF ADMINISTERED DRUGS (ALT 637 FOR MEDICARE OP): Mod: IPSPLIT

## 2024-07-24 RX ORDER — PROCHLORPERAZINE EDISYLATE 5 MG/ML
10 INJECTION INTRAMUSCULAR; INTRAVENOUS EVERY 6 HOURS PRN
Status: DISCONTINUED | OUTPATIENT
Start: 2024-07-24 | End: 2024-07-30 | Stop reason: HOSPADM

## 2024-07-24 RX ORDER — IBUPROFEN 200 MG
1 TABLET ORAL DAILY
Status: DISCONTINUED | OUTPATIENT
Start: 2024-07-24 | End: 2024-07-30 | Stop reason: HOSPADM

## 2024-07-24 RX ORDER — PROCHLORPERAZINE MALEATE 10 MG
10 TABLET ORAL EVERY 6 HOURS PRN
Status: DISCONTINUED | OUTPATIENT
Start: 2024-07-24 | End: 2024-07-30 | Stop reason: HOSPADM

## 2024-07-24 RX ORDER — AMOXICILLIN AND CLAVULANATE POTASSIUM 875; 125 MG/1; MG/1
1 TABLET, FILM COATED ORAL EVERY 12 HOURS SCHEDULED
Status: DISCONTINUED | OUTPATIENT
Start: 2024-07-24 | End: 2024-07-25

## 2024-07-24 RX ORDER — DICYCLOMINE HYDROCHLORIDE 10 MG/1
10 CAPSULE ORAL EVERY 6 HOURS PRN
Status: DISCONTINUED | OUTPATIENT
Start: 2024-07-24 | End: 2024-07-30 | Stop reason: HOSPADM

## 2024-07-24 RX ORDER — SODIUM CHLORIDE 9 MG/ML
10 INJECTION, SOLUTION INTRAVENOUS CONTINUOUS PRN
Status: DISCONTINUED | OUTPATIENT
Start: 2024-07-24 | End: 2024-07-30 | Stop reason: HOSPADM

## 2024-07-24 SDOH — ECONOMIC STABILITY: HOUSING INSECURITY: IN THE PAST 12 MONTHS, HOW MANY TIMES HAVE YOU MOVED WHERE YOU WERE LIVING?: 1

## 2024-07-24 ASSESSMENT — COGNITIVE AND FUNCTIONAL STATUS - GENERAL
DRESSING REGULAR LOWER BODY CLOTHING: A LITTLE
STANDING UP FROM CHAIR USING ARMS: A LITTLE
DAILY ACTIVITIY SCORE: 21
HELP NEEDED FOR BATHING: A LITTLE
CLIMB 3 TO 5 STEPS WITH RAILING: TOTAL
MOVING TO AND FROM BED TO CHAIR: A LITTLE
MOBILITY SCORE: 19
DRESSING REGULAR UPPER BODY CLOTHING: A LITTLE

## 2024-07-24 ASSESSMENT — ENCOUNTER SYMPTOMS
RECTAL PAIN: 0
CONSTIPATION: 0
EYES NEGATIVE: 1
ROS GI COMMENTS: SEE HPI
VOMITING: 0
MUSCULOSKELETAL NEGATIVE: 1
ABDOMINAL PAIN: 1
BLOOD IN STOOL: 0
ALLERGIC/IMMUNOLOGIC NEGATIVE: 1
ABDOMINAL DISTENTION: 0
SHORTNESS OF BREATH: 1
NAUSEA: 0
DIARRHEA: 1
ANAL BLEEDING: 0
ABDOMINAL PAIN: 0
ENDOCRINE NEGATIVE: 1
HEMATOLOGIC/LYMPHATIC NEGATIVE: 1
NEUROLOGICAL NEGATIVE: 1
DIARRHEA: 0
CONSTITUTIONAL NEGATIVE: 1
CARDIOVASCULAR NEGATIVE: 1
PSYCHIATRIC NEGATIVE: 1

## 2024-07-24 ASSESSMENT — PAIN SCALES - GENERAL
PAINLEVEL_OUTOF10: 0 - NO PAIN
PAINLEVEL_OUTOF10: 0 - NO PAIN

## 2024-07-24 ASSESSMENT — ACTIVITIES OF DAILY LIVING (ADL): LACK_OF_TRANSPORTATION: NO

## 2024-07-24 NOTE — H&P
"History Of Present Illness  Gigi Carver \"Rui\" is a 79 y.o. male presenting with abdominal pain. Pt was recently admitted to [facility] for concern of mesenteric ischemia, UTI, and altered mental. Pt ultimately left AMA. Pt states he came to our ER for abdominal pain. Pt states the abdominal pain comes on about 15-20 mins after he eats and is usually resolved by pepto bismol or tylenol. Pt also reports diarrhea after eating but states nothing makes it better or worse. Upon examination, pt has no abdominal pain. Pt is alert and oriented but is very forgetful and keeps repeating the same questions.     ED VS: T36.7, HR 92, RR 19, /74, Sp02 92%RA    Imaging: CT abd/pelvis- 1. Mild pericardial effusion with a thickness measuring 1.0 cm.  2. Moderate to severe centrilobular emphysema.  3. There is thickening of the distal esophagus measuring up to 0.8 cm.  There is thickening of the gastric wall. Correlation regarding  gastritis is suggested.  4. Nodular contour of the liver may be associated with cirrhosis. No  hepatic mass or perihepatic ascites.  5. Prominent portal vessels measuring up to 1.3 cm.  6. Scattered colonic diverticulosis without acute diverticulitis.  7. Abnormal bladder wall thickening measuring up to 0.7 cm.  Correlation regarding cystitis is suggested.  8. Prominent prostate measuring 4.5 x 4.8 cm.  9. Small umbilical hernia containing fat only.  10. 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    CT chest- Severe emphysema with multiple pulmonary nodules are unchanged.  No focal regions of airspace consolidation.  Small amount of mucus/debris within the distal trachea is new compared  to exam 7/15/2024    Labs: Glu 111, Na 140, K 3.7, Bun/creat 19/0.85, Trop 39, , WBC 3.5, H/H 13.7/40.4, Plt 111     Past Medical History  Past Medical History:   Diagnosis Date    Personal history of other (healed) physical injury and trauma     History of head " injury    Personal history of other diseases of the digestive system 10/07/2013    History of esophageal reflux       Surgical History  Past Surgical History:   Procedure Laterality Date    MR HEAD ANGIO WO IV CONTRAST  2/25/2015    MR HEAD ANGIO WO IV CONTRAST 2/25/2015 [facility] INPATIENT LEGACY    ROTATOR CUFF REPAIR  03/25/2013    Rotator Cuff Repair        Social History  He reports that he has been smoking cigarettes. He has never used smokeless tobacco. No history on file for alcohol use and drug use.    Family History  No family history on file.     Allergies  Alprazolam, Ciprofloxacin, Clonazepam, Doxycycline, Famotidine, Nsaids (non-steroidal anti-inflammatory drug), Olanzapine, Quetiapine, Sertraline, Shellfish containing products, and Shrimp    Review of Systems   Constitutional: Negative.    HENT: Negative.     Eyes: Negative.    Respiratory:  Positive for shortness of breath.    Cardiovascular: Negative.    Gastrointestinal:  Positive for abdominal pain and diarrhea.   Endocrine: Negative.    Genitourinary: Negative.    Musculoskeletal: Negative.    Skin: Negative.    Allergic/Immunologic: Negative.    Neurological: Negative.    Hematological: Negative.    Psychiatric/Behavioral: Negative.          Physical Exam  Vitals reviewed.   HENT:      Head: Normocephalic and atraumatic.      Right Ear: External ear normal.      Left Ear: External ear normal.      Nose: Nose normal.      Mouth/Throat:      Pharynx: Oropharynx is clear.   Eyes:      Conjunctiva/sclera: Conjunctivae normal.   Cardiovascular:      Rate and Rhythm: Normal rate and regular rhythm.      Pulses: Normal pulses.      Heart sounds: Normal heart sounds.   Pulmonary:      Breath sounds: Decreased breath sounds present.   Abdominal:      General: Bowel sounds are normal.      Palpations: Abdomen is soft.   Musculoskeletal:         General: Normal range of motion.      Cervical back: Normal range of motion and neck supple.   Skin:     General:  "Skin is dry.   Neurological:      General: No focal deficit present.      Mental Status: He is alert and oriented to person, place, and time.      Comments: Forgetful    Psychiatric:         Mood and Affect: Mood normal.         Behavior: Behavior normal.          Last Recorded Vitals  Blood pressure 117/87, pulse 83, temperature 36.7 °C (98.1 °F), temperature source Temporal, resp. rate 16, height 1.702 m (5' 7\"), weight 69.7 kg (153 lb 10.6 oz), SpO2 94%.    Relevant Results  Scheduled medications  amoxicillin-pot clavulanate, 1 tablet, oral, q12h WILL  aspirin, 81 mg, oral, Daily  azithromycin, 500 mg, intravenous, q24h  [Held by provider] enoxaparin, 40 mg, subcutaneous, q24h  hydrOXYzine HCL, 25 mg, oral, BID  methylPREDNISolone sodium succinate (PF), 40 mg, intravenous, q8h WILL  nicotine, 1 patch, transdermal, Daily  OLANZapine, 5 mg, oral, Nightly  pantoprazole, 40 mg, oral, Daily before breakfast  sucralfate, 1 g, oral, q6h WILL  tamsulosin, 0.4 mg, oral, Daily      Continuous medications  sodium chloride 0.9%, 10 mL/hr      PRN medications  PRN medications: acetaminophen, acetaminophen, albuterol, ondansetron, oxygen, polyethylene glycol, sodium chloride 0.9%    Results for orders placed or performed during the hospital encounter of 07/23/24 (from the past 24 hour(s))   ECG 12 lead   Result Value Ref Range    Ventricular Rate 91 BPM    Atrial Rate 91 BPM    ID Interval 128 ms    QRS Duration 140 ms    QT Interval 396 ms    QTC Calculation(Bazett) 487 ms    P Axis 28 degrees    R Axis 62 degrees    T Axis -75 degrees    QRS Count 15 beats    Q Onset 225 ms    P Onset 161 ms    P Offset 207 ms    T Offset 423 ms    QTC Fredericia 455 ms   Urinalysis with Reflex Culture and Microscopic   Result Value Ref Range    Color, Urine Yellow Light-Yellow, Yellow, Dark-Yellow    Appearance, Urine Clear Clear    Specific Gravity, Urine 1.018 1.005 - 1.035    pH, Urine 6.5 5.0, 5.5, 6.0, 6.5, 7.0, 7.5, 8.0    Protein, Urine " NEGATIVE NEGATIVE, 10 (TRACE), 20 (TRACE) mg/dL    Glucose, Urine Normal Normal mg/dL    Blood, Urine 0.03 (TRACE) (A) NEGATIVE    Ketones, Urine NEGATIVE NEGATIVE mg/dL    Bilirubin, Urine NEGATIVE NEGATIVE    Urobilinogen, Urine Normal Normal mg/dL    Nitrite, Urine NEGATIVE NEGATIVE    Leukocyte Esterase, Urine 250 Delores/µL (A) NEGATIVE   Extra Urine Gray Tube   Result Value Ref Range    Extra Tube Hold for add-ons.    Microscopic Only, Urine   Result Value Ref Range    WBC, Urine 1-5 1-5, NONE /HPF    RBC, Urine 3-5 NONE, 1-2, 3-5 /HPF    Squamous Epithelial Cells, Urine 1-9 (SPARSE) Reference range not established. /HPF    Budding Yeast, Urine PRESENT (A) NONE /HPF    Yeast Hyphae, Urine PRESENT (A) NONE /HPF    Mucus, Urine FEW Reference range not established. /LPF   Troponin, High Sensitivity, 1 Hour   Result Value Ref Range    Troponin I, High Sensitivity 31 (H) 0 - 20 ng/L   Lactate   Result Value Ref Range    Lactate 1.2 0.4 - 2.0 mmol/L   CBC   Result Value Ref Range    WBC 2.2 (L) 4.4 - 11.3 x10*3/uL    nRBC      RBC 3.80 (L) 4.50 - 5.90 x10*6/uL    Hemoglobin 13.3 (L) 13.5 - 17.5 g/dL    Hematocrit 40.1 (L) 41.0 - 52.0 %     (H) 80 - 100 fL    MCH 35.0 (H) 26.0 - 34.0 pg    MCHC 33.2 32.0 - 36.0 g/dL    RDW 14.1 11.5 - 14.5 %    Platelets 85 (L) 150 - 450 x10*3/uL   Basic metabolic panel   Result Value Ref Range    Glucose 207 (H) 74 - 99 mg/dL    Sodium 141 136 - 145 mmol/L    Potassium 4.4 3.5 - 5.3 mmol/L    Chloride 104 98 - 107 mmol/L    Bicarbonate 34 (H) 21 - 32 mmol/L    Anion Gap 7 (L) 10 - 20 mmol/L    Urea Nitrogen 15 6 - 23 mg/dL    Creatinine 0.71 0.50 - 1.30 mg/dL    eGFR >90 >60 mL/min/1.73m*2    Calcium 8.3 (L) 8.6 - 10.3 mg/dL        Assessment/Plan   Principal Problem:    COPD exacerbation (Multi)  Active Problems:    Cirrhosis of liver without ascites (Multi)    Acute hypoxic respiratory failure (Multi)    Tobacco use disorder    Generalized abdominal pain    Superior mesenteric  artery stenosis (Multi)    Acute cystitis without hematuria    Benign prostatic hyperplasia without lower urinary tract symptoms    Pancytopenia (Multi)    Recurrent major depressive disorder (CMS-HCC)    Dementia (Multi)    Anxiety      #Presumed COPD exacerbation  #Acute hypoxic respiratory failure   #Tobacco use disorder   -CT chest: Severe emphysema with multiple pulmonary nodules are unchanged.  No focal regions of airspace consolidation.  Small amount of mucus/debris within the distal trachea is new compared  to exam 7/15/2024  -WBC 3.5  -Lactate 1.2  -Supplemental oxygen to maintain an sp02 greater than 92%  -Currently on 2L  -Baseline RA  -Nicotine patch   -Solumedrol q8  -Bronchodilators   -IV azithro (Day 2)  -RT to eval/treat  -Encourage cessation    #Abdominal pain  #Liver cirrhosis   #Superior mesenteric stenosis   -Recently seen by GI and Vascular at [facility]  -Rec endoscopy outpatient for variceal monitoring   -Rec follow up with Vasc for SMA stenosis   -CT angiogram: 1. Mild pericardial effusion with a thickness measuring 1.0 cm.  2. Moderate to severe centrilobular emphysema.  3. There is thickening of the distal esophagus measuring up to 0.8 cm.  There is thickening of the gastric wall. Correlation regarding  gastritis is suggested.  4. Nodular contour of the liver may be associated with cirrhosis. No  hepatic mass or perihepatic ascites.  5. Prominent portal vessels measuring up to 1.3 cm.  6. Scattered colonic diverticulosis without acute diverticulitis.  7. Abnormal bladder wall thickening measuring up to 0.7 cm.  Correlation regarding cystitis is suggested.  8. Prominent prostate measuring 4.5 x 4.8 cm.  9. Small umbilical hernia containing fat only.  10. 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  -GI consult, appreciate recs   -Continue PPI, sucralfate   -Continue ASA    #UTI  #BPH  -Urine cx from 7/12 grew enterococcus faecalis resistant to  cipro  -Was dc on cipro  -Start PO augmentin  - leuks, 1-5 WBC   -Urine cx pending   -Continue tamsulosin     #Pancytopenia  -WBC 2.2  -RBC 3.80  -H/H 13.3/40.1  -Plt 85  -Hold AC, lovenox   -Daily CBC  -Monitor   -Outpatient follow up with hepatology     #Hx of concussion with brain injury   #Concern for dementia  #Anxiety   #Depression  -Safety precautions  -PT/OT evals  -Continue hydroxyzine, olanzapine    DVT ppx  -ASA    PUD ppx  -pantoprazole    F: PRN  E: Replete per protocol  N: Regular   A: PIV    Disposition: Pt requires less than 2 inpatient days at this time   Code Status: DNR/DNI       MARCELO Guajardo-CNP    Attending Attestation:    I was present with the APRN-CNP who participated in the documentation of this note. I have personally seen and re-examined the patient and performed the medical decision-making components (assessment and plan of care). I have reviewed the documentation and verified the findings in the note as written with additions or exceptions as stated in the body of this note.    Dr. Kesha Swanson MD  Internal Medicine

## 2024-07-24 NOTE — CARE PLAN
The patient's goals for the shift include sleep    The clinical goals for the shift include maintain oxygen sat > or = 92% throughout this shift.    Over the shift, the patient did not make progress toward the following goals. Barriers to progression include increase oxygen demand. Recommendations to address these barriers include monitor and treat hypoxia.

## 2024-07-24 NOTE — PROGRESS NOTES
"Physical Therapy                 Therapy Communication Note    Patient Name: Gigi Carver \"Rui\"  MRN: [id number]  Today's Date: 7/24/2024     Discipline: Physical Therapy    Missed Visit Reason: Missed Visit Reason: Patient sleeping (nursing reports patient had just returned to bed and advised to let patient sleep at this time if unable to awaken and participate.)    Missed Time: Attempt    Comment:  "

## 2024-07-24 NOTE — CONSULTS
"Inpatient consult to Gastroenterology  Consult performed by: TUAN Black  Consult ordered by: TUAN Gutierres  Reason for consult: abdominal pain and esophagitis      History Of Present Illness  Gigi Carver \"Rui\" is a 79 y.o. male who presented to [facility] with a chief complaint of abdominal pain. He was recently discharged from [facility] due to abdominal pain and was diagnosed with superior mesenteric stenosis. He was evaluated by vascular with low suspicion for mesenteric ischemia, it was recommended for pt to follow up with Dr. Beebe for SMA stenosis. He was also diagnosed with cirrhosis and evaluated by GI during that admission with recommendation for outpatient follow up and EGD. Today, he denies abdominal pain, states that he ate a big breakfast and wants to go home now.      Past Medical History:   Diagnosis Date    Personal history of other (healed) physical injury and trauma     History of head injury    Personal history of other diseases of the digestive system 10/07/2013    History of esophageal reflux       Past Surgical History:   Procedure Laterality Date    MR HEAD ANGIO WO IV CONTRAST  2/25/2015    MR HEAD ANGIO WO IV CONTRAST 2/25/2015 [facility] INPATIENT LEGACY    ROTATOR CUFF REPAIR  03/25/2013    Rotator Cuff Repair        Social history  He reports that he has been smoking cigarettes. He has never used smokeless tobacco. No history on file for alcohol use and drug use.    No family history on file.     Allergies  Alprazolam, Ciprofloxacin, Clonazepam, Doxycycline, Famotidine, Nsaids (non-steroidal anti-inflammatory drug), Olanzapine, Quetiapine, Sertraline, Shellfish containing products, and Shrimp    Review of Systems   Gastrointestinal:  Negative for abdominal distention, abdominal pain, anal bleeding, blood in stool, constipation, diarrhea, nausea, rectal pain and vomiting.        See HPI   All other systems reviewed and are negative.      Last Recorded " "Vitals  Blood pressure 117/87, pulse 83, temperature 36.7 °C (98.1 °F), temperature source Temporal, resp. rate 16, height 1.702 m (5' 7\"), weight 69.7 kg (153 lb 10.6 oz), SpO2 95%.    Physical Exam  Constitutional: well nourished, unkempt. NAD. Alert and cooperative  Skin: no jaundice   Eyes: anicteric, normal conjunctiva  ENT: MMM  Pulmonary: easy and nonlabored on NC  Abdomen: soft, NT, ND. No ascites.  MSK: MAEx4  Extremities: no edema  Neuro: aaox3. No asterixis. Forgetful  Psych: inappropriate mood and behavior, irritable    Intake/Output last 3 Shifts:  I/O last 3 completed shifts:  In: - (0 mL/kg)   Out: 350 (5 mL/kg) [Urine:350 (0.1 mL/kg/hr)]  Weight: 69.7 kg      Current medications during hospitalization  Scheduled medications  aspirin, 81 mg, oral, Daily  azithromycin, 500 mg, intravenous, q24h  [Held by provider] enoxaparin, 40 mg, subcutaneous, q24h  hydrOXYzine HCL, 25 mg, oral, BID  ipratropium-albuteroL, 3 mL, nebulization, TID  methylPREDNISolone sodium succinate (PF), 40 mg, intravenous, q8h WILL  OLANZapine, 5 mg, oral, Nightly  pantoprazole, 40 mg, oral, Daily before breakfast  sucralfate, 1 g, oral, q6h WILL  tamsulosin, 0.4 mg, oral, Daily      Continuous medications  sodium chloride 0.9%, 10 mL/hr      PRN medications  PRN medications: acetaminophen, acetaminophen, albuterol, ondansetron, oxygen, polyethylene glycol, sodium chloride 0.9%    Relevant Results  BMP:  Lab Results   Component Value Date     07/24/2024     07/23/2024     07/20/2024    K 4.4 07/24/2024    K 3.7 07/23/2024    K 4.2 07/20/2024     07/24/2024     07/23/2024    CL 99 07/20/2024    CO2 34 (H) 07/24/2024    CO2 30 07/23/2024    CO2 34 (H) 07/20/2024    BUN 15 07/24/2024    BUN 19 07/23/2024    BUN 9 07/20/2024    CREATININE 0.71 07/24/2024    CREATININE 0.85 07/23/2024    CREATININE 0.82 07/20/2024       CBC:  Lab Results   Component Value Date    WBC 2.2 (L) 07/24/2024    WBC 3.5 (L) " "07/23/2024    WBC 3.2 (L) 07/20/2024    RBC 3.80 (L) 07/24/2024    RBC 3.93 (L) 07/23/2024    RBC 3.86 (L) 07/20/2024    HGB 13.3 (L) 07/24/2024    HGB 13.7 07/23/2024    HGB 13.5 07/20/2024    HCT 40.1 (L) 07/24/2024    HCT 40.4 (L) 07/23/2024    HCT 38.9 (L) 07/20/2024     (H) 07/24/2024     (H) 07/23/2024     (H) 07/20/2024    MCH 35.0 (H) 07/24/2024    MCH 34.9 (H) 07/23/2024    MCH 35.0 (H) 07/20/2024    MCHC 33.2 07/24/2024    MCHC 33.9 07/23/2024    MCHC 34.7 07/20/2024    RDW 14.1 07/24/2024    RDW 14.2 07/23/2024    RDW 13.4 07/20/2024    PLT 85 (L) 07/24/2024     (L) 07/23/2024    PLT 72 (L) 07/20/2024       Lactate Level:  Lab Results   Component Value Date    LACTATE 1.2 07/23/2024    LACTATE 2.5 (H) 07/23/2024    LACTATE 1.0 07/16/2024       CRP/ESR Level:  No results found for: \"CRP\", \"SEDRATE\"    Hepatic Function Panel:  Lab Results   Component Value Date    ALKPHOS 45 07/23/2024    ALKPHOS 41 07/20/2024    ALKPHOS 39 07/18/2024    ALT 17 07/23/2024    ALT 15 07/20/2024    ALT 14 07/18/2024    AST 23 07/23/2024    AST 24 07/20/2024    AST 26 07/18/2024    PROT 6.2 (L) 07/23/2024    PROT 5.4 (L) 07/20/2024    PROT 4.8 (L) 07/18/2024    BILITOT 0.8 07/23/2024    BILITOT 1.0 07/20/2024    BILITOT 1.0 07/18/2024    BILIDIR 0.2 07/20/2024    BILIDIR 0.3 07/18/2024       Magnesium:  Lab Results   Component Value Date    MG 1.38 (L) 07/19/2024       INR:  Lab Results   Component Value Date    PROTIME 12.8 07/16/2024    INR 1.1 07/16/2024       TSH/T4:  No results found for: \"TSH\"    Lipid Profile:  No results found for: \"CHLPL\", \"TRIG\", \"HDL\", \"LDLCALC\", \"LDLDIRECT\"    Amylase/Lipase:  Lab Results   Component Value Date    LIPASE 11 07/12/2024       Calcium Level  Lab Results   Component Value Date    CALCIUM 8.3 (L) 07/24/2024    CALCIUM 8.6 07/23/2024    CALCIUM 8.1 (L) 07/20/2024        Radiology:   CT angio abdomen pelvis w and or wo IV IV contrast   Final Result   1. Mild " pericardial effusion with a thickness measuring 1.0 cm.   2. Moderate to severe centrilobular emphysema.   3. There is thickening of the distal esophagus measuring up to 0.8 cm.   There is thickening of the gastric wall. Correlation regarding   gastritis is suggested.   4. Nodular contour of the liver may be associated with cirrhosis. No   hepatic mass or perihepatic ascites.   5. Prominent portal vessels measuring up to 1.3 cm.   6. Scattered colonic diverticulosis without acute diverticulitis.   7. Abnormal bladder wall thickening measuring up to 0.7 cm.   Correlation regarding cystitis is suggested.   8. Prominent prostate measuring 4.5 x 4.8 cm.   9. Small umbilical hernia containing fat only.   10. 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   Signed by Audie Mark MD      CT chest wo IV contrast   Final Result   Severe emphysema with multiple pulmonary nodules are unchanged.   No focal regions of airspace consolidation.   Small amount of mucus/debris within the distal trachea is new compared   to exam 7/15/2024   Signed by Bridger Madden MD        [facility] US MESENTERIC ARTERY DUPLEX COMPLETE  Diagnosis/ICD: Chronic mesenteric ischemia-K55.1    CONCLUSIONS:  Mesenteric: SMA demonstrates a hemodynamically significant stenosis of greater than 70%. Technically difficult and limited exam due to patient movement and inability to cooperate. May wish other means of evaluation.     Imaging & Doppler Findings:     Celiac Origin  cm/s  Celiac Prox PSV   141 cm/s  Celiac Mid PSV    113 cm/s  Celiac Dist PSV   144 cm/s  SMA Origin PSV    156 cm/s  SMA Prox PSV      349 cm/s     07/16/24 1044  US liver with doppler Final result   Impression:  Cirrhotic liver        The paraumbilical vein is recanalized from portal hypertension. No   splenomegaly from portal hypertension. No ascites in the right upper   quadrant on today's ultrasound. No ascites anywhere in the abdomen or    pelvis on the recent CT 12 July 2024        There was no acute thrombosis anywhere in the portal venous system on   recent CT with contrast        Today's ultrasound again confirms entire portal venous system   including the splenic vein, main and intrahepatic portal veins is all   patent with appropriate direction of flow; still no acute thrombosis   in the portal venous system        Today's ultrasound also confirms no bidirectional or reversed flow in   the portal venous system        All three hepatic veins are patent with somewhat dampened (biphasic   rather than triphasic) waveforms; no acute hepatic venous thrombosis        Gallbladder borderline for dilation but no shadowing gallstone        3-4 mm echogenic gallbladder filling defect without posterior   acoustic shadowing could be an echogenic sludge ball or small polyp        07/12/24 1620  CT abdomen pelvis w IV contrast Final result   Impression:  1. Cirrhotic liver with varices.   2. Apparent gallbladder wall thickening, most likely due to hepatic   disease. 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   3. Enlarged heterogenous prostate gland projecting in the bladder   floor.   4. Minimal nonspecific colonic wall thickening, possibly due to   infectious or inflammatory colitis, without abscess or bowel   obstruction.          Assessment/Plan   79 y.o. male on day 0 of admission presenting with COPD exacerbation (Multi). GI consulted for abdominal pain and esophagitis on CTA abdomen/pelvis. Patient denies abdominal complaints today.     Continue PPI daily on dc   Recommend outpatient follow up with Dr. Camacho for EGD for variceal monitoring and further hepatology work up  Follow up with Dr. Beebe for SMA stenosis as recommended during recent hospitalization  Avoid hepatotoxic medications, NSAIDs  No objection to discharge from GI      Thank you for  the consult, GI will sign off    MARCELO Black-CNP

## 2024-07-24 NOTE — CARE PLAN
The patient's goals for the shift include sleep    The clinical goals for the shift include Decrease in abdominal pain. Able to tolerate food.    Over the shift, the patient did make progress toward the following goals.     1600 Assumed care of patient. Assessment completed as charted. Family at bedside. Patient denies pain or complaints. Call light within reach.    1745 Patient complains of nausea and abdominal discomfort after eating. Medicated with Zofran. Family at bedside.    1810 Patient states nausea is better. Moved in bed and states abdominal pain returned. Secure chat sent to provider.     1830 Patient medicated with Bentyl as ordered for abdominal pain. Family requesting EGD be completed inpatient. Discussed with provider.    2000 Up to bathroom, unsteady gait. Patient more alert and oriented. States he is feeling better.     2110 In to give pm meds. Patient eating snacks. Denies complaints.     2240 Patient aware he is NPO after midnight for procedure in am.

## 2024-07-24 NOTE — DISCHARGE INSTR - DIET
The dietitian that visited with you was Ksenia Sommer, HIWOT, LD, CLC. If you have additional questions please feel free to reach out to her at:  Phone: [phone]   Email: [email or web address]

## 2024-07-24 NOTE — PROGRESS NOTES
07/24/24 1515   Discharge Planning   Living Arrangements Alone   Support Systems Friends/neighbors;Children   Assistance Needed Patient alert to self and situation, forgetful, noted to repeat self quite a bit. Patient lives home alone, relies on neighbors to bring him food. Patient denies the use of assistive devices or home oxygen at home. Per patient he hasn't been to a PCP in a long time, daughter, Thao Smart, at bedside, states since 2015, she has him set up to see Dr. Florian Neri- earliest appointment is in October. Thao Smart stated, years ago patient was active with [facility]. Awaiting PT/OT recommendations to secure a discharge plan for patient. IMM obtained.   Type of Residence Private residence   Number of Stairs to Enter Residence 5   Number of Stairs Within Residence 24   Do you have animals or pets at home? No   Who is requesting discharge planning? Provider   Home or Post Acute Services In home services   Type of Post Acute Facility Services Assisted living   Type of Home Care Services DME or oxygen;Home health aide;Home nursing visits;Home OT;Home PT;Meals on Wheels   Expected Discharge Disposition  Services   Does the patient need discharge transport arranged? Yes   RoundTrip coordination needed? Yes   Has discharge transport been arranged? No   Financial Resource Strain   How hard is it for you to pay for the very basics like food, housing, medical care, and heating? Somewhat   Housing Stability   In the last 12 months, was there a time when you were not able to pay the mortgage or rent on time? N   In the past 12 months, how many times have you moved where you were living? 1   At any time in the past 12 months, were you homeless or living in a shelter (including now)? N   Transportation Needs   In the past 12 months, has lack of transportation kept you from medical appointments or from getting medications? no   In the past 12 months, has lack of transportation kept you from  meetings, work, or from getting things needed for daily living? No     7/24/24 Thao Smart, daughter brought in POA and living will, copies made.

## 2024-07-24 NOTE — PROGRESS NOTES
"Occupational Therapy                 Therapy Communication Note    Patient Name: Gigi Carver \"Rui\"  MRN: [id number]  Today's Date: 7/24/2024     Discipline: Occupational Therapy    Missed Visit Reason: Attempted to see the pt. for OT evaluation.  Pt.'s nurse reported that the pt. had recently returned to bed.  Unable to awaken the pt. for participation at this time.    Missed Time: Attempt at 11:00AM      "
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
## 2024-07-24 NOTE — NURSING NOTE
Patient extremely forgetful. Asking the same questions over and over again . Patient is aware of where he is but doesn't understand simple directions.   Asked for nicotine patch as isabella brown just asked me to help him find his cigarettes as he needs to smoke one.   Awaiting order.

## 2024-07-24 NOTE — NURSING NOTE
Patient was new admit during shift. Patient is disoriented to time but aware to situation. Patient is very confused when first woken up and takes multiple reorientation cues before he is able to follow direction. Patient has been incontinent of bowel and bladder during shift, as he cannot make it to the toilet on time. Spoke to patient's daughter Thao Smart who plans to come to hospital in the morning. Patient aware. Patient up with assistance of one to the restroom- two needed if he is confused. Bed alarm on for safety.

## 2024-07-25 ENCOUNTER — ANESTHESIA (OUTPATIENT)
Dept: GASTROENTEROLOGY | Facility: HOSPITAL | Age: 79
End: 2024-07-25
Payer: MEDICARE

## 2024-07-25 ENCOUNTER — APPOINTMENT (OUTPATIENT)
Dept: GASTROENTEROLOGY | Facility: HOSPITAL | Age: 79
DRG: 391 | End: 2024-07-25
Payer: MEDICARE

## 2024-07-25 ENCOUNTER — ANESTHESIA EVENT (OUTPATIENT)
Dept: GASTROENTEROLOGY | Facility: HOSPITAL | Age: 79
End: 2024-07-25
Payer: MEDICARE

## 2024-07-25 LAB
ANION GAP SERPL CALC-SCNC: <7 MMOL/L (ref 10–20)
BACTERIA UR CULT: NO GROWTH
BUN SERPL-MCNC: 26 MG/DL (ref 6–23)
CALCIUM SERPL-MCNC: 8.8 MG/DL (ref 8.6–10.3)
CHLORIDE SERPL-SCNC: 102 MMOL/L (ref 98–107)
CO2 SERPL-SCNC: 39 MMOL/L (ref 21–32)
CREAT SERPL-MCNC: 0.75 MG/DL (ref 0.5–1.3)
EGFRCR SERPLBLD CKD-EPI 2021: >90 ML/MIN/1.73M*2
ERYTHROCYTE [DISTWIDTH] IN BLOOD BY AUTOMATED COUNT: 14.1 % (ref 11.5–14.5)
GLUCOSE SERPL-MCNC: 138 MG/DL (ref 74–99)
HCT VFR BLD AUTO: 41 % (ref 41–52)
HGB BLD-MCNC: 13.6 G/DL (ref 13.5–17.5)
MCH RBC QN AUTO: 34.5 PG (ref 26–34)
MCHC RBC AUTO-ENTMCNC: 33.2 G/DL (ref 32–36)
MCV RBC AUTO: 104 FL (ref 80–100)
NRBC BLD-RTO: 0 /100 WBCS (ref 0–0)
PLATELET # BLD AUTO: 110 X10*3/UL (ref 150–450)
POTASSIUM SERPL-SCNC: 4.5 MMOL/L (ref 3.5–5.3)
RBC # BLD AUTO: 3.94 X10*6/UL (ref 4.5–5.9)
SODIUM SERPL-SCNC: 141 MMOL/L (ref 136–145)
WBC # BLD AUTO: 5.2 X10*3/UL (ref 4.4–11.3)

## 2024-07-25 PROCEDURE — 2500000004 HC RX 250 GENERAL PHARMACY W/ HCPCS (ALT 636 FOR OP/ED): Mod: IPSPLIT | Performed by: INTERNAL MEDICINE

## 2024-07-25 PROCEDURE — 80048 BASIC METABOLIC PNL TOTAL CA: CPT | Mod: IPSPLIT

## 2024-07-25 PROCEDURE — 97162 PT EVAL MOD COMPLEX 30 MIN: CPT | Mod: GP,IPSPLIT | Performed by: PHYSICAL THERAPIST

## 2024-07-25 PROCEDURE — 88305 TISSUE EXAM BY PATHOLOGIST: CPT | Performed by: PATHOLOGY

## 2024-07-25 PROCEDURE — 7100000001 HC RECOVERY ROOM TIME - INITIAL BASE CHARGE: Mod: IPSPLIT

## 2024-07-25 PROCEDURE — 2500000002 HC RX 250 W HCPCS SELF ADMINISTERED DRUGS (ALT 637 FOR MEDICARE OP, ALT 636 FOR OP/ED): Mod: IPSPLIT | Performed by: NURSE ANESTHETIST, CERTIFIED REGISTERED

## 2024-07-25 PROCEDURE — 43239 EGD BIOPSY SINGLE/MULTIPLE: CPT | Mod: IPSPLIT | Performed by: SURGERY

## 2024-07-25 PROCEDURE — 97165 OT EVAL LOW COMPLEX 30 MIN: CPT | Mod: GO,IPSPLIT | Performed by: OCCUPATIONAL THERAPIST

## 2024-07-25 PROCEDURE — 2500000005 HC RX 250 GENERAL PHARMACY W/O HCPCS: Mod: IPSPLIT | Performed by: NURSE PRACTITIONER

## 2024-07-25 PROCEDURE — 94640 AIRWAY INHALATION TREATMENT: CPT | Mod: IPSPLIT

## 2024-07-25 PROCEDURE — 2500000001 HC RX 250 WO HCPCS SELF ADMINISTERED DRUGS (ALT 637 FOR MEDICARE OP): Mod: IPSPLIT | Performed by: NURSE PRACTITIONER

## 2024-07-25 PROCEDURE — 97129 THER IVNTJ 1ST 15 MIN: CPT | Mod: GO,IPSPLIT | Performed by: OCCUPATIONAL THERAPIST

## 2024-07-25 PROCEDURE — 2500000004 HC RX 250 GENERAL PHARMACY W/ HCPCS (ALT 636 FOR OP/ED): Mod: IPSPLIT | Performed by: NURSE PRACTITIONER

## 2024-07-25 PROCEDURE — 2500000004 HC RX 250 GENERAL PHARMACY W/ HCPCS (ALT 636 FOR OP/ED): Mod: IPSPLIT | Performed by: NURSE ANESTHETIST, CERTIFIED REGISTERED

## 2024-07-25 PROCEDURE — 2500000002 HC RX 250 W HCPCS SELF ADMINISTERED DRUGS (ALT 637 FOR MEDICARE OP, ALT 636 FOR OP/ED): Mod: IPSPLIT | Performed by: NURSE PRACTITIONER

## 2024-07-25 PROCEDURE — 99223 1ST HOSP IP/OBS HIGH 75: CPT | Performed by: SURGERY

## 2024-07-25 PROCEDURE — 85027 COMPLETE CBC AUTOMATED: CPT | Mod: IPSPLIT

## 2024-07-25 PROCEDURE — 3700000002 HC GENERAL ANESTHESIA TIME - EACH INCREMENTAL 1 MINUTE: Mod: IPSPLIT

## 2024-07-25 PROCEDURE — 94760 N-INVAS EAR/PLS OXIMETRY 1: CPT | Mod: IPSPLIT

## 2024-07-25 PROCEDURE — 3700000001 HC GENERAL ANESTHESIA TIME - INITIAL BASE CHARGE: Mod: IPSPLIT

## 2024-07-25 PROCEDURE — 7100000002 HC RECOVERY ROOM TIME - EACH INCREMENTAL 1 MINUTE: Mod: IPSPLIT

## 2024-07-25 PROCEDURE — 99232 SBSQ HOSP IP/OBS MODERATE 35: CPT

## 2024-07-25 PROCEDURE — 87493 C DIFF AMPLIFIED PROBE: CPT | Mod: GENLAB | Performed by: INTERNAL MEDICINE

## 2024-07-25 PROCEDURE — 0DB98ZX EXCISION OF DUODENUM, VIA NATURAL OR ARTIFICIAL OPENING ENDOSCOPIC, DIAGNOSTIC: ICD-10-PCS | Performed by: SURGERY

## 2024-07-25 PROCEDURE — 97130 THER IVNTJ EA ADDL 15 MIN: CPT | Mod: GO,IPSPLIT | Performed by: OCCUPATIONAL THERAPIST

## 2024-07-25 PROCEDURE — 1200000002 HC GENERAL ROOM WITH TELEMETRY DAILY: Mod: IPSPLIT

## 2024-07-25 PROCEDURE — 0753T DGTZ GLS MCRSCP SLD LEVEL IV: CPT | Mod: TC,GENLAB,IPSPLIT | Performed by: SURGERY

## 2024-07-25 PROCEDURE — 36415 COLL VENOUS BLD VENIPUNCTURE: CPT | Mod: IPSPLIT

## 2024-07-25 RX ORDER — ALBUTEROL SULFATE 0.83 MG/ML
2.5 SOLUTION RESPIRATORY (INHALATION) ONCE
Status: COMPLETED | OUTPATIENT
Start: 2024-07-25 | End: 2024-07-25

## 2024-07-25 RX ORDER — PROPOFOL 10 MG/ML
INJECTION, EMULSION INTRAVENOUS AS NEEDED
Status: DISCONTINUED | OUTPATIENT
Start: 2024-07-25 | End: 2024-07-25

## 2024-07-25 SDOH — HEALTH STABILITY: MENTAL HEALTH: CURRENT SMOKER: 1

## 2024-07-25 ASSESSMENT — COGNITIVE AND FUNCTIONAL STATUS - GENERAL
MOVING FROM LYING ON BACK TO SITTING ON SIDE OF FLAT BED WITH BEDRAILS: A LITTLE
DRESSING REGULAR UPPER BODY CLOTHING: A LITTLE
TURNING FROM BACK TO SIDE WHILE IN FLAT BAD: A LITTLE
STANDING UP FROM CHAIR USING ARMS: A LITTLE
MOVING TO AND FROM BED TO CHAIR: A LITTLE
PERSONAL GROOMING: A LITTLE
DRESSING REGULAR LOWER BODY CLOTHING: A LITTLE
DAILY ACTIVITIY SCORE: 17
WALKING IN HOSPITAL ROOM: A LITTLE
CLIMB 3 TO 5 STEPS WITH RAILING: TOTAL
PERSONAL GROOMING: A LITTLE
TURNING FROM BACK TO SIDE WHILE IN FLAT BAD: A LITTLE
MOVING TO AND FROM BED TO CHAIR: A LITTLE
DAILY ACTIVITIY SCORE: 20
TOILETING: A LITTLE
HELP NEEDED FOR BATHING: A LITTLE
HELP NEEDED FOR BATHING: A LOT
WALKING IN HOSPITAL ROOM: A LITTLE
STANDING UP FROM CHAIR USING ARMS: A LITTLE
CLIMB 3 TO 5 STEPS WITH RAILING: TOTAL
TOILETING: A LOT
MOBILITY SCORE: 17
DRESSING REGULAR LOWER BODY CLOTHING: A LITTLE
MOBILITY SCORE: 16

## 2024-07-25 ASSESSMENT — PAIN - FUNCTIONAL ASSESSMENT
PAIN_FUNCTIONAL_ASSESSMENT: 0-10

## 2024-07-25 ASSESSMENT — PAIN SCALES - GENERAL
PAINLEVEL_OUTOF10: 0 - NO PAIN
PAIN_LEVEL: 0
PAINLEVEL_OUTOF10: 0 - NO PAIN

## 2024-07-25 ASSESSMENT — ACTIVITIES OF DAILY LIVING (ADL)
ADL_ASSISTANCE: INDEPENDENT
ADL_ASSISTANCE: INDEPENDENT
BATHING_ASSISTANCE: MINIMAL

## 2024-07-25 ASSESSMENT — ENCOUNTER SYMPTOMS: ABDOMINAL PAIN: 1

## 2024-07-25 NOTE — CONSULTS
Abdominal Pain      This is a consult request for patient with chronic epigastric abdominal pain.  This is an 79-year-old male who was admitted to [facility] with abdominal pain he recently been admitted to [facility] where he had undergone an extensive workup which included a mesenteric duplex scan.  He had a CT angio.  He was noted to have atherosclerotic disease.  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  He says that he has chronic pain which seems to worsen when he eats.  His wife is recently passed and he feels that this may be contributing to his pain.  He indicates that he had an ulcer in the past in his stomach.  He is on oral Carafate and a proton pump inhibitor.  He was also noted to have a 2.9 cm abdominal aortic aneurysm.  Past Medical History:   Diagnosis Date    Personal history of other (healed) physical injury and trauma     History of head injury    Personal history of other diseases of the digestive system 10/07/2013    History of esophageal reflux          Current Facility-Administered Medications:     acetaminophen (Tylenol) tablet 650 mg, 650 mg, oral, q4h PRN, TUAN Gutierres    acetaminophen (Tylenol) tablet 650 mg, 650 mg, oral, q4h PRN, MARCELO Gutierres-CNP, 650 mg at 07/23/24 2127    albuterol 2.5 mg /3 mL (0.083 %) nebulizer solution 2.5 mg, 2.5 mg, nebulization, q2h PRN, Kesha Swanson MD    amoxicillin-pot clavulanate (Augmentin) 875-125 mg per tablet 1 tablet, 1 tablet, oral, [facility]12h [facility], 1 tablet at 07/24/24 2104    aspirin EC tablet 81 mg, 81 mg, oral, Daily, TUAN Gutierres, 81 mg at 07/24/24 0922    azithromycin (Zithromax) in dextrose 5 % in water (D5W) 250 mL  mg, 500 mg, intravenous, q24h, MARCELO Gutierres-CNP, Stopped at 07/24/24 2204    dicyclomine (Bentyl) capsule 10 mg, 10 mg, oral, q6h PRN, MARCELO Hua-CNP, 10 mg at 07/24/24 1826    [Held by provider]  enoxaparin (Lovenox) syringe 40 mg, 40 mg, subcutaneous, q24h, MARCELO Gutierres-CNP, 40 mg at 07/23/24 2127    hydrOXYzine HCL (Atarax) tablet 25 mg, 25 mg, oral, BID, MARCELO Gutierres-CNP, 25 mg at 07/24/24 1722    methylPREDNISolone sod succinate (SOLU-Medrol) 40 mg/mL injection 40 mg, 40 mg, intravenous, q8h WILL, MARCELO Gutierres-CNP, 40 mg at 07/25/24 0632    nicotine (Nicoderm CQ) 14 mg/24 hr patch 1 patch, 1 patch, transdermal, Daily, TUAN Guajardo, 1 patch at 07/24/24 1158    OLANZapine (ZyPREXA) tablet 5 mg, 5 mg, oral, Nightly, MARCELO Gutierres-CNP, 5 mg at 07/24/24 2104    ondansetron (Zofran) injection 4 mg, 4 mg, intravenous, q8h PRN, MARCELO Gutierres-CNP, 4 mg at 07/24/24 1744    oxygen (O2) therapy, , inhalation, Continuous PRN - O2/gases, TUAN Gutierres, 2 L/min at 07/24/24 2101    pantoprazole (ProtoNix) EC tablet 40 mg, 40 mg, oral, Daily before breakfast, MARCELO Gutierres-CNP, 40 mg at 07/25/24 0632    polyethylene glycol (Glycolax, Miralax) packet 17 g, 17 g, oral, Daily PRN, MARCELO Gutierres-CNP    prochlorperazine (Compazine) tablet 10 mg, 10 mg, oral, q6h PRN **OR** prochlorperazine (Compazine) injection 10 mg, 10 mg, intravenous, q6h PRN, MARCELO Hua-CNP    sodium chloride 0.9% infusion, 10 mL/hr, intravenous, Continuous PRN, Kesha Swanson MD    sucralfate (Carafate) suspension 1 g, 1 g, oral, q6h WILL, MARCELO Gutierres-CNP, 1 g at 07/25/24 0632    tamsulosin (Flomax) 24 hr capsule 0.4 mg, 0.4 mg, oral, Daily, Therese TUAN Bose, 0.4 mg at 07/24/24 0922     Allergies   Allergen Reactions    Alprazolam Unknown    Ciprofloxacin Unknown    Clonazepam Unknown    Doxycycline Unknown    Famotidine Unknown    Nsaids (Non-Steroidal Anti-Inflammatory Drug) Unknown    Olanzapine Unknown    Quetiapine Unknown    Sertraline Unknown    Shellfish Containing Products Unknown    Shrimp Unknown        Review of  Systems  Review of Systems   Gastrointestinal:  Positive for abdominal pain.       Objective     Vital signs for last 24 hours:  Temp:  [36.7 °C (98.1 °F)] 36.7 °C (98.1 °F)  Heart Rate:  [83-97] 94  Resp:  [16] 16  BP: (117-128)/(77-87) 128/80  FiO2 (%):  [28 %] 28 %    Intake/Output this shift:  No intake/output data recorded.    Physical Exam  Physical Exam  Vitals reviewed.   Constitutional:       Appearance: He is underweight.   HENT:      Head: Normocephalic.   Cardiovascular:      Rate and Rhythm: Normal rate and regular rhythm.      Heart sounds: Normal heart sounds.   Pulmonary:      Effort: Pulmonary effort is normal.      Breath sounds: Normal breath sounds.   Abdominal:      General: Abdomen is flat. There is no distension.      Palpations: Abdomen is soft. There is no mass.      Tenderness: There is abdominal tenderness in the epigastric area. There is no guarding.   Musculoskeletal:         General: Normal range of motion.      Cervical back: Normal range of motion.   Skin:     General: Skin is warm.   Neurological:      General: No focal deficit present.   Psychiatric:         Mood and Affect: Mood is anxious.         Labs & Radiology      Labs Reviewed   CBC WITH AUTO DIFFERENTIAL - Abnormal       Result Value    WBC 3.5 (*)     nRBC 0.0      RBC 3.93 (*)     Hemoglobin 13.7      Hematocrit 40.4 (*)      (*)     MCH 34.9 (*)     MCHC 33.9      RDW 14.2      Platelets 111 (*)     Neutrophils % 50.6      Immature Granulocytes %, Automated 0.3      Lymphocytes % 32.9      Monocytes % 12.7      Eosinophils % 2.6      Basophils % 0.9      Neutrophils Absolute 1.75      Immature Granulocytes Absolute, Automated 0.01      Lymphocytes Absolute 1.14      Monocytes Absolute 0.44      Eosinophils Absolute 0.09      Basophils Absolute 0.03     COMPREHENSIVE METABOLIC PANEL - Abnormal    Glucose 111 (*)     Sodium 140      Potassium 3.7      Chloride 102      Bicarbonate 30      Anion Gap 12      Urea  Nitrogen 19      Creatinine 0.85      eGFR 88      Calcium 8.6      Albumin 3.5      Alkaline Phosphatase 45      Total Protein 6.2 (*)     AST 23      Bilirubin, Total 0.8      ALT 17     LACTATE - Abnormal    Lactate 2.5 (*)     Narrative:     Venipuncture immediately after or during the administration of Metamizole may lead to falsely low results. Testing should be performed immediately                  prior to Metamizole dosing.   URINALYSIS WITH REFLEX CULTURE AND MICROSCOPIC - Abnormal    Color, Urine Yellow      Appearance, Urine Clear      Specific Gravity, Urine 1.018      pH, Urine 6.5      Protein, Urine NEGATIVE      Glucose, Urine Normal      Blood, Urine 0.03 (TRACE) (*)     Ketones, Urine NEGATIVE      Bilirubin, Urine NEGATIVE      Urobilinogen, Urine Normal      Nitrite, Urine NEGATIVE      Leukocyte Esterase, Urine 250 Delores/µL (*)    B-TYPE NATRIURETIC PEPTIDE - Abnormal     (*)     Narrative:        <100 pg/mL - Heart failure unlikely                  100-299 pg/mL - Intermediate probability of acute heart                                  failure exacerbation. Correlate with clinical                                  context and patient history.                    >=300 pg/mL - Heart Failure likely. Correlate with clinical                                  context and patient history.                                    BNP testing is performed using different testing methodology at [facility] than at other [facility] hospitals. Direct result comparisons should only be made within the same method.                      SERIAL TROPONIN-INITIAL - Abnormal    Troponin I, High Sensitivity 39 (*)     Narrative:     Less than 99th percentile of normal range cutoff-                  Female and children under 18 years old <14 ng/L; Male <21 ng/L: Negative                  Repeat testing should be performed if clinically indicated.                                     Female and children under  18 years old 14-50 ng/L; Male 21-50 ng/L:                  Consistent with possible cardiac damage and possible increased clinical                   risk. Serial measurements may help to assess extent of myocardial damage.                                     >50 ng/L: Consistent with cardiac damage, increased clinical risk and                  myocardial infarction. Serial measurements may help assess extent of                   myocardial damage.                                      NOTE: Children less than 1 year old may have higher baseline troponin                   levels and results should be interpreted in conjunction with the overall                   clinical context.                                     NOTE: Troponin I testing is performed using a different                   testing methodology at [facility]] than at other                   [facility]. Direct result comparisons should only                   be made within the same method.   SERIAL TROPONIN, 1 HOUR - Abnormal    Troponin I, High Sensitivity 31 (*)     Narrative:     Less than 99th percentile of normal range cutoff-                  Female and children under 18 years old <14 ng/L; Male <21 ng/L: Negative                  Repeat testing should be performed if clinically indicated.                                     Female and children under 18 years old 14-50 ng/L; Male 21-50 ng/L:                  Consistent with possible cardiac damage and possible increased clinical                   risk. Serial measurements may help to assess extent of myocardial damage.                                     >50 ng/L: Consistent with cardiac damage, increased clinical risk and                  myocardial infarction. Serial measurements may help assess extent of                   myocardial damage.                                      NOTE: Children less than 1 year old may have higher baseline troponin                   levels and  results should be interpreted in conjunction with the overall                   clinical context.                                     NOTE: Troponin I testing is performed using a different                   testing methodology at [facility] than at other                   [facility]. Direct result comparisons should only                   be made within the same method.   MICROSCOPIC ONLY, URINE - Abnormal    WBC, Urine 1-5      RBC, Urine 3-5      Squamous Epithelial Cells, Urine 1-9 (SPARSE)      Budding Yeast, Urine PRESENT (*)     Yeast Hyphae, Urine PRESENT (*)     Mucus, Urine FEW     CBC - Abnormal    WBC 2.2 (*)     nRBC        RBC 3.80 (*)     Hemoglobin 13.3 (*)     Hematocrit 40.1 (*)      (*)     MCH 35.0 (*)     MCHC 33.2      RDW 14.1      Platelets 85 (*)    BASIC METABOLIC PANEL - Abnormal    Glucose 207 (*)     Sodium 141      Potassium 4.4      Chloride 104      Bicarbonate 34 (*)     Anion Gap 7 (*)     Urea Nitrogen 15      Creatinine 0.71      eGFR >90      Calcium 8.3 (*)    BLOOD CULTURE - Normal    Blood Culture No growth at 1 day     BLOOD CULTURE - Normal    Blood Culture No growth at 1 day     LACTATE - Normal    Lactate 1.2      Narrative:     Venipuncture immediately after or during the administration of Metamizole may lead to falsely low results. Testing should be performed immediately                  prior to Metamizole dosing.   LIPASE - Normal    Lipase 16      Narrative:     Venipuncture immediately after or during the administration of Metamizole may lead to falsely low results. Testing should be performed immediately prior to Metamizole dosing.   URINE CULTURE   URINALYSIS WITH REFLEX CULTURE AND MICROSCOPIC    Narrative:     The following orders were created for panel order Urinalysis with Reflex Culture and Microscopic.                  Procedure                               Abnormality         Status                                     ---------                                -----------         ------                                     Urinalysis with Reflex C...[[id number]]  Abnormal            Final result                               Extra Urine Gray Tube[[id number]]                            Final result                                                 Please view results for these tests on the individual orders.   EXTRA URINE GRAY TUBE    Extra Tube Hold for add-ons.     TROPONIN SERIES- (INITIAL, 1 HR)    Narrative:     The following orders were created for panel order Troponin I Series, High Sensitivity (0, 1 HR).                  Procedure                               Abnormality         Status                                     ---------                               -----------         ------                                     Troponin I, High Sensiti...[[id number]]  Abnormal            Final result                               Troponin, High Sensitivi...[[id number]]  Abnormal            Final result                                                 Please view results for these tests on the individual orders.   CBC   BASIC METABOLIC PANEL   MORPHOLOGY    RBC Morphology See Below      RBC Fragments Few      Ovalocytes Few       CT abdomen pelvis w IV contrast 07/12/2024    Narrative  Interpreted By:  Kaiden James,  STUDY:  CT ABDOMEN PELVIS W IV CONTRAST; 7/12/2024 4:20 pm    INDICATION:  Signs/Symptoms:diffuse abdominal pain and diarrhea x 8 days after  eating chicken that had been sitting out for three days    COMPARISON:  March 2017.    ACCESSION NUMBER(S):  [id number]    ORDERING CLINICIAN:  JACKIE MANCIA    TECHNIQUE:  Following intravenous administration of nonionic contrast, spiral  axial images were obtained from the dome of the diaphragm through the  symphysis pubis. Oral contrast was not administered.  Soft tissue and lung windows were evaluated.  Sagittal and Coronal reformatted images were also assessed.    All CT examinations are  performed with one or more of the following  dose reduction techniques: Automated Exposure Control, adjustment of  mA and/or kV according to patient size, or use of iterative  reconstruction techniques.    FINDINGS:  Lung bases: The lung bases are clear.  Liver: Small heterogenous lobulated cirrhotic liver with recanalized  umbilical vein. Gallbladder: Mildly thickened gallbladder wall,  probably related to chronic hepatic disease. No obvious gallstones.  Spleen: Normal in size without focal lesions. Pancreas: Unremarkable.  Adrenal glands: No focal lesions.  Kidneys: Both kidneys excrete contrast symmetrically, without  hydronephrosis or solid enhancing masses. Evaluation for renal  calculi is limited due to presence of IV contrast.      There is minimal colonic wall thickening with minimal pericolonic fat  stranding, however there is no abscess or bowel obstruction.    Calcified aorta measuring up to 2.3 cm. 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 The  SMA, SMV and portal vein are patent. Pelvic reproductive organs:  Enlarged heterogenous prostate gland containing calcifications and  projecting in the bladder floor. Bladder is empty and can not be  adequately evaluated.    Impression  1. Cirrhotic liver with varices.  2. Apparent gallbladder wall thickening, most likely due to hepatic  disease. 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  3. Enlarged heterogenous prostate gland projecting in the bladder  floor.  4. Minimal nonspecific colonic wall thickening, possibly due to  infectious or inflammatory colitis, without abscess or bowel  obstruction.    Signed by: Kaiden James 7/12/2024 4:42 PM  Dictation workstation:   [id number]      Impression  Chronic epigastric pain.  Mesenteric vascular disease, liver cirrhosis, normal liver function tests   Plan    Continue Carafate.    ESOPHAGOGASTRODODENOSCOPY/ BIOPSIES   Risks include, but not limited to pain, infection, bleeding, perforation,  missed lesions, aspiration, risk of cardiac, pulmonary, neurologic, locomotor, anesthetic events,  and other unforeseen complications including death.  Patient understands and agrees to the plan as outlined

## 2024-07-25 NOTE — PROGRESS NOTES
07/25/24 4326   Discharge Planning   Assistance Needed PT/OT recommendations are moderate intensity. Discussed with patient and daughter( at bedside) what recommendations mean. Both patient and daughter in agreement with skilled nursing facility, list provided. Upon follow up, first choice is [facility] and second choice is [facility], referrals sent.   Who is requesting discharge planning? Provider   Home or Post Acute Services Post acute facilities (Rehab/SNF/etc)   Type of Post Acute Facility Services Skilled nursing   Expected Discharge Disposition SNF   Patient Choice   Provider Choice list and CMS website (https://medicare.gov/care-compare#search) for post-acute Quality and Resource Measure Data were provided and reviewed with: Family;Patient   Patient / Family choosing to utilize agency / facility established prior to hospitalization No

## 2024-07-25 NOTE — ANESTHESIA POSTPROCEDURE EVALUATION
"Patient: Gigi Carver \"Rui\"    Procedure Summary       Date: 07/25/24 Room / Location: [facility]    Anesthesia Start: 1545 Anesthesia Stop: 1608    Procedure: EGD Diagnosis: Epigastric pain    Scheduled Providers: Jose E Myles MD Responsible Provider: MAURICIO Montano    Anesthesia Type: MAC ASA Status: 4            Anesthesia Type: MAC    Vitals Value Taken Time   /93 07/25/24 1604   Temp 36.3 °C (97.3 °F) 07/25/24 1604   Pulse 91 07/25/24 1604   Resp 18 07/25/24 1604   SpO2 94 % 07/25/24 1604       Anesthesia Post Evaluation    Patient location during evaluation: PACU  Patient participation: complete - patient participated  Level of consciousness: awake  Pain score: 0  Pain management: adequate  Airway patency: patent  Cardiovascular status: acceptable  Respiratory status: acceptable and face mask  Hydration status: acceptable  Postoperative Nausea and Vomiting: none        No notable events documented.    "

## 2024-07-25 NOTE — CARE PLAN
The patient's goals for the shift include sleep    The clinical goals for the shift include Patient will maintain tolerable pain level  Patient with very little sleep overnight anxious NOTHING BY MOUTH since midnight

## 2024-07-25 NOTE — PROGRESS NOTES
"Physical Therapy    Physical Therapy Evaluation    Patient Name: Gigi Carver \"Rui\"  MRN: [id number]  Today's Date: 7/25/2024   Time Calculation  Start Time: 0833  Stop Time: 0908  Time Calculation (min): 35 min    Assessment/Plan   PT Assessment  PT Assessment Results: Decreased endurance, Impaired balance, Decreased mobility, Impaired judgement, Decreased safety awareness  Rehab Prognosis: Good  Evaluation/Treatment Tolerance: Patient tolerated treatment well, Treatment limited secondary to agitation  Medical Staff Made Aware: Yes  Strengths: Living arrangement secure, Support of Caregivers  Barriers to Participation: Insight into problems  End of Session Communication: Bedside nurse  Assessment Comment: Pt is a 79 y.o. male admitted for with abdominal pain presenting with a slight decline from functional baseline requiring increased assistance for transfers and safe ambulation. Pt without reports of pain at this time. Demonstrates decreased stability, balance, safety awareness, impaired gait mechanics and decreased tolerance to activity. Currently on 2.5 L of O2 which he reports he is not on at home. PT warranted at this time to address impairments so patient can progress to PLOF and/or safe mobility.  End of Session Patient Position: Alarm on, Up in chair  IP OR SWING BED PT PLAN  Inpatient or Swing Bed: Inpatient  PT Plan  Treatment/Interventions: Transfer training, Gait training, Stair training, Balance training, Neuromuscular re-education, Strengthening, Endurance training, Therapeutic exercise, Therapeutic activity  PT Plan: Ongoing PT  PT Frequency: 3 times per week  PT Discharge Recommendations: Moderate intensity level of continued care (mod based on safety at home alone and current impairments with balance and decision making. Pt's daughter would like patient to recieve more rehab after discharge.)  Equipment Recommended upon Discharge: Wheeled walker  PT Recommended Transfer Status: Assist x1  PT - OK " to Discharge: Yes  Based on completed evaluation and care plan recommendations, no barriers to discharge to next site of care         Subjective   General Visit Information:  General  Reason for Referral: impaired mobility  Referred By: Carlos DICKERSON  Past Medical History Relevant to Rehab: esophageal reflux, UTI, AMS  Missed Visit: Yes  Missed Visit Reason: Patient sleeping (nursing reports patient had just returned to bed and advised to let patient sleep at this time if unable to awaken and participate.)  Family/Caregiver Present: No (spoke on phone with daughter sahil cadet prior to evaluation. Feels pt needs further rehab in SNF. She is in from out of town and patient lives alone.)  Prior to Session Communication: Bedside nurse  Patient Position Received: Bed, 2 rail up, Alarm on  General Comment: Pt sitting up on bed upon arrival. A+O x 2, confused regarding location. Pt agreeable to PT evaluation after motivating patient. Pt on 2.5 L of O2, visiable soiled PT/OT assisted patient to the bathroom. Pt left with all needs place  Home Living:  Home Living  Type of Home: House  Lives With: Alone  Home Adaptive Equipment: Walker rolling or standard, Cane  Home Layout: One level  Home Access: Level entry  Bathroom Shower/Tub: Tub/shower unit  Bathroom Toilet: Handicapped height  Bathroom Equipment: Grab bars in shower, Grab bars around toilet  Prior Level of Function:  Prior Function Per Pt/Caregiver Report  Level of [place]: Independent with ADLs and functional transfers, Needs assistance with homemaking  Receives Help From: Neighbor, Other (Comment)  ADL Assistance: Independent  Homemaking Assistance: Needs assistance (has assistance as needed but typically completes independently)  Ambulatory Assistance: Independent  Vocational: Retired  Hand Dominance: Right  Precautions:  Precautions  Hearing/Visual Limitations: glasses  Medical Precautions: Fall precautions, Oxygen therapy device and L/min  Vital  Signs:  Vital Signs  Heart Rate: 96  Heart Rate Source: Monitor  SpO2: 98 %  Patient Position: Sitting    Objective   Pain:  Pain Assessment  Pain Assessment: 0-10  0-10 (Numeric) Pain Score: 0 - No pain  Cognition:  Cognition  Orientation Level: Disoriented to place    General Assessments:                       Strength  Strength Comments: KEYUR COVARRUBIAS 5/5 grossly  Static Sitting Balance  Static Sitting-Level of Assistance: Independent    Static Standing Balance  Static Standing-Level of Assistance: Contact guard (sba)  Dynamic Standing Balance  Dynamic Standing-Balance Support:  (Fair, pt unsteady with single leg and dynamic reach)  Dynamic Standing-Comments: pt with decreased safety awareness with impulsive movements during standing.  Functional Assessments:  Bed Mobility  Bed Mobility: Yes  Bed Mobility 1  Bed Mobility 1: Supine to sitting  Level of Assistance 1: Modified independent    Transfers  Transfer: Yes  Transfer 1  Transfer From 1: Bed to  Transfer to 1: Toilet  Technique 1: Sit to stand, Stand to sit  Transfer Level of Assistance 1: Contact guard  Transfers 2  Transfer From 2: Toilet to  Transfer to 2: Chair with arms  Technique 2: Sit to stand, Stand to sit  Transfer Level of Assistance 2: Contact guard    Ambulation/Gait Training  Ambulation/Gait Training Performed: Yes  Ambulation/Gait Training 1  Device 1: No device  Assistance 1: Contact guard  Quality of Gait 1: Decreased step length, Diminished heel strike  Comments/Distance (ft) 1: 40' x 1  total in room, pt with slight instability during ambulation due to decreased safety awareness and need for assistance. Pt tends to make impulsive and abrupt movements. Attempting to pick items up off floor without support. Safety being main concern with ambulation at this time.    Outcome Measures:  [facility] Basic Mobility  Turning from your back to your side while in a flat bed without using bedrails: A little  Moving from lying on your back to sitting on the side of  a flat bed without using bedrails: A little  Moving to and from bed to chair (including a wheelchair): A little  Standing up from a chair using your arms (e.g. wheelchair or bedside chair): A little  To walk in hospital room: A little  Climbing 3-5 steps with railing: Total  Basic Mobility - Total Score: 16    Encounter Problems       Encounter Problems (Active)       Mobility       transfers        Start:  07/25/24    Expected End:  08/01/24       Pt will transfer from sit to stand to sit and from bed to chair to bed with LRAD mod I demonstrating increased safety awareness and balance         ambulation       Start:  07/25/24    Expected End:  08/01/24       Pt will ambulate with LRAD 200' x 2 with mod I and improved gait mechanics         stairs       Start:  07/25/24    Expected End:  08/01/24       Pt will ascend and descend 8 stairs with handrail to simulate home set up with mod I         Balance       Start:  07/25/24    Expected End:  08/01/24       Pt will improve dynamic standing balance to G for improve mobility            Pain - Adult              Education Documentation  Precautions, taught by Biju Garcia PT at 7/25/2024 11:36 AM.  Learner: Patient  Readiness: Acceptance  Method: Explanation, Demonstration  Response: Verbalizes Understanding, Needs Reinforcement  Comment: Pt educated on safety awareness and overall importance of PT for improving safe functional mobility to decrease chances of falls.    Education Comments  No comments found.

## 2024-07-25 NOTE — PROGRESS NOTES
07/25/24 4706   Discharge Planning   Assistance Needed PT/OT recommendations are moderate intensity. Discussed with patient and daughter( at bedside) what recommendations mean. Both patient and daughter in agreement with skilled nursing facility, list provided. Upon follow up, [facility] choice is [facility] and second choice is [facility], referrals sent.

## 2024-07-25 NOTE — DISCHARGE INSTRUCTIONS

## 2024-07-25 NOTE — PROGRESS NOTES
"Occupational Therapy    Evaluation/Treatment    Patient Name: Gigi Carver \"Rui\"  MRN: [id number]  : 1945  Today's Date: 24  Time Calculation  Start Time: 836  Stop Time: 935  Time Calculation (min): 59 min     Assessment:  OT Assessment: Pt. is a 79 y.o. male referred to occupational therapy for imapried self-care 2/2 admisison for COPD exacerbation. Pt. displays decreased balance, safety awareness, cognition, transfers, ADLs, poor insight and limited coping skills. Pt. would benefit from skilled OT at MOD intensity to address above deficits and return to PLOF in LRE. Spoke with NP about psych consult for impaired coping skills and limited processing/grieving the death of his spouse. Based on MOCA assessment and time spent with pt. recommend pt. has increased assistance in the home d/t forgetfulness, limited attention and impaired grieving- depression tendencies.  Prognosis: Fair  Barriers to Discharge: Decreased caregiver support  Evaluation/Treatment Tolerance: Patient tolerated treatment well  Medical Staff Made Aware: Yes  End of Session Communication: Bedside nurse  OT Assessment Results: Decreased ADL status, Decreased endurance, Decreased safe judgment during ADL, Decreased functional mobility, Decreased cognition, Decreased IADLs  Prognosis: Fair  Barriers to Discharge: Decreased caregiver support  Evaluation/Treatment Tolerance: Patient tolerated treatment well  Medical Staff Made Aware: Yes  Barriers to Participation: Coping skills (cognition)  Plan:  Treatment Interventions: ADL retraining, Functional transfer training, Endurance training, Patient/family training, Neuromuscular reeducation, Equipment evaluation/education, Compensatory technique education, Cognitive reorientation  OT Frequency: 3 times per week  OT Discharge Recommendations: Moderate intensity level of continued care  Equipment Recommended upon Discharge: Wheeled walker  OT Recommended Transfer Status: Minimal assist, " Assist of 1  OT - OK to Discharge: Yes (Based on completed evaluation and care plan recommendations, no barriers to discharge to next site of care)  Treatment Interventions: ADL retraining, Functional transfer training, Endurance training, Patient/family training, Neuromuscular reeducation, Equipment evaluation/education, Compensatory technique education, Cognitive reorientation    Subjective   Current Problem:  1. Epigastric pain  Esophagogastroduodenoscopy (EGD)      2. COPD with acute exacerbation (Multi)        3. Hypoxia        4. Cirrhosis of liver without ascites, unspecified hepatic cirrhosis type (Multi)  Referral to Gastroenterology      5. Abnormal CT of the abdomen  Referral to Gastroenterology        General:   OT Received On: 07/25/24  General  Reason for Referral: impaired self-care d/t admission for COPD exacerbation  Referred By: TUAN Guajardo;  Past Medical History Relevant to Rehab: masenteric ischemia, UTI, AMS, GERD  Family/Caregiver Present: No (PT spoke on phone with daughter sahil cadet prior to evaluation. Feels pt needs further rehab in SNF. She is in from out of town and patient lives alone.)  Prior to Session Communication: Bedside nurse  Patient Position Received: Bed, 2 rail up, Alarm on  General Comment: natividad  Precautions:  Hearing/Visual Limitations: glasses  Medical Precautions: Fall precautions, Oxygen therapy device and L/min (2.5LPM)  Vital Signs:  Heart Rate: 92  Heart Rate Source: Monitor  SpO2: 98 %  Patient Position: Sitting  Pain:  Pain Assessment  Pain Assessment: 0-10  0-10 (Numeric) Pain Score: 0 - No pain    Objective   Cognition:  Overall Cognitive Status: Within Functional Limits  Orientation Level: Disoriented to place, Disoriented to situation  Attention: Exceptions to WFL  Alternating Attention: Impaired  Divided Attention: Impaired  Sustained Attention: Impaired  Memory: Exceptions to WFL  Short-Term Memory: Impaired  Working Memory: Impaired  Memory  Comments: reports he has trouble with his memory  Numeric Reasoning: Exceptions to WFL   Abstract Reasoning: Exceptions to WFL  Safety/Judgement: Exceptions to WFL  Insight: Severe  Cognition Test Scores  Cognition Tests: Cognition Test Performed (moca)  Home Living:  Type of Home: House  Lives With: Alone  Home Adaptive Equipment: Walker rolling or standard, Cane  Home Layout: One level  Home Access: Level entry  Bathroom Shower/Tub: Tub/shower unit  Bathroom Toilet: Handicapped height  Bathroom Equipment: Grab bars in shower, Grab bars around toilet  Prior Function:  Level of [place]: Independent with ADLs and functional transfers, Independent with homemaking with ambulation  Receives Help From: Neighbor, Other (Comment) ([employer] 3 days a week (MWF) - pt. states they help him with whatever he needs)  ADL Assistance: Independent  Homemaking Assistance: Independent (has assistance as needed but typically completes independently)  Ambulatory Assistance: Independent  Vocational: Retired  Leisure: watches tapes  Hand Dominance: Right  IADL History:  Homemaking Responsibilities: Yes  Meal Prep Responsibility: Primary  Laundry Responsibility: Primary  Cleaning Responsibility: Primary  Bill Paying/Finance Responsibility: Primary  Shopping Responsibility: Secondary  Current License: Yes  ADL:  Eating Assistance: Independent (anticipated)  Grooming Assistance: Stand by (anticipated)  Bathing Assistance: Minimal (anticipated)  UE Dressing Assistance: Independent (anticipated)  LE Dressing Assistance: Stand by  LE Dressing Deficit: Thread RLE into underwear, Thread LLE into underwear  Toileting Assistance with Device: Minimal  Toileting Deficit: Grab bar use, Perineal hygiene  Activities of Daily Living:   Toileting  Toileting Level of Assistance: Minimum assistance  Where Assessed: Toilet  Toileting Comments: perineal and proximal LE hygiene completed as pt. was soiled.  Activity Tolerance:  Endurance:  Tolerates 30 min exercise with multiple rests (noted SOB with fast speech)  Bed Mobility/Transfers:      Transfers  Transfer: Yes  Transfer 1  Transfer From 1: Bed to  Transfer to 1: Toilet  Technique 1: Sit to stand, Stand to sit  Transfer Level of Assistance 1: Contact guard, Minimum assistance  Transfers 2  Transfer From 2: Toilet to  Transfer to 2: Chair with arms  Technique 2: Sit to stand, Stand to sit  Transfer Level of Assistance 2: Contact guard, Minimum assistance  Trials/Comments 2: pt irritated with therapist assisting with ambulation.      Functional Mobility:  Functional Mobility  Functional Mobility Performed: Yes  Functional Mobility 1  Surface 1: Level tile  Device 1: No device (pt. declined)  Assistance 1: Contact guard (MIN A at times; pt refused help)  Comments 1: Pt. very unsteady with ambulation. LOB noted into walls. Pt. declines hx of falls but warned OT to get out of his way incase he did fall. Rec use of device however, pt. not amenable to device use.  Sitting Balance:  Static Sitting Balance  Static Sitting-Balance Support: Feet supported  Static Sitting-Level of Assistance: Independent  Dynamic Sitting Balance  Dynamic Sitting-Balance Support: Feet supported  Dynamic Sitting-Balance: Forward lean  Dynamic Sitting-Comments: IND  Standing Balance:  Static Standing Balance  Static Standing-Balance Support: No upper extremity supported  Static Standing-Level of Assistance: Contact guard  Dynamic Standing Balance  Dynamic Standing-Balance Support: No upper extremity supported  Dynamic Standing-Balance: Turning  Dynamic Standing-Comments: CGA/MIN A  Vision:Vision - Basic Assessment  Current Vision: Wears glasses only for reading  Patient Visual Report:  (reports he wears them because they were his wifes, pt. declines needing them all the time.)  Sensation:  Sensation Comment: denies deficits  Strength:  Strength Comments: BUE: 5/5 grossly throughout  Coordination:  Movements are Fluid and  "Coordinated: Yes   Hand Function:  Hand Function  Gross Grasp: Functional  Coordination: Functional  Extremities: RUE   RUE : Within Functional Limits and LUE   LUE: Within Functional Limits    Outcome Measures: [facility] Daily Activity  Putting on and taking off regular lower body clothing: A little  Bathing (including washing, rinsing, drying): A little  Putting on and taking off regular upper body clothing: None  Toileting, which includes using toilet, bedpan or urinal: A little  Taking care of personal grooming such as brushing teeth: A little  Eating Meals: None  Daily Activity - Total Score: 20     and MoCA  Visuospatial/Executive: 3 (pt. able to draw clock did not place all the numbers; not able to connect the trail)  Naming: 3  Memory (Score '0' as this is an Unscored Section): 0  Attention: Read List of Digits: 1 (for statement backwards stated \"3472\" the correct answer was 742)  Attention: Read List of Letters: 1  Attention: Serial Sevens: 3 (Pt answered, \"93, 86, 69, 62, 55\" pt. was able to answer 4 correct subractions of 7)  Language: Repeat: 2  Language: Fluency: 1 (pt. able to list 12 words)  Abstraction: 1 (stated train and bicycles have wheels)  Delayed Recall: 0 (unable to recall any words; noted limited attention thoughout the test which required redirection)  Orientation: 5 (Pt. utilized the environment without cueing to answer these questions appropriately which may have skewed the assessment, though there are no clear rules for using the enviornment for orientation)  Add 1 Point if </=12 yr Education: 0  MOCA Total Score: 20  Score consistent with mild cognitive impairment. Though patient did use environmental cues to answer appropriately for orientation. Upon entrance into room stated we were at [facility] during test stated [place].     Education Documentation  Body Mechanics, taught by Alfreda Waters OT at 7/25/2024 11:32 AM.  Learner: Patient  Readiness: Acceptance  Method: Explanation  Response: Needs " Reinforcement  Comment: Edu on POC and DC rec. Edu on transfers, safety and cognition.    Precautions, taught by Alfreda Waters OT at 7/25/2024 11:32 AM.  Learner: Patient  Readiness: Acceptance  Method: Explanation  Response: Needs Reinforcement  Comment: Edu on POC and DC rec. Edu on transfers, safety and cognition.    ADL Training, taught by Alfreda Waters OT at 7/25/2024 11:32 AM.  Learner: Patient  Readiness: Acceptance  Method: Explanation  Response: Needs Reinforcement  Comment: Edu on POC and DC rec. Edu on transfers, safety and cognition.    Education Comments  No comments found.        Goals:  Encounter Problems       Encounter Problems (Active)       ADLs       Patient will perform UB and LB bathing with modified independent level of assistance.       Start:  07/25/24    Expected End:  08/08/24            Patient will complete daily grooming tasks brushing teeth and washing face/hair with modified independent level of assistance and PRN adaptive equipment while standing.       Start:  07/25/24    Expected End:  08/08/24            Patient will complete toileting including hygiene clothing management/hygiene with modified independent level of assistance.       Start:  07/25/24    Expected End:  08/08/24               COGNITION/SAFETY       Pt. will demo good safety awareness 75% of the time throughout therapy sessions to reduce risks of falls and injury.        Start:  07/25/24    Expected End:  08/08/24               MOBILITY       Patient will perform Functional mobility max Household distances/Community Distances with modified independent level of assistance and front wheeled walker in order to improve safety and functional mobility.       Start:  07/25/24    Expected End:  08/08/24               TRANSFERS       Patient will complete sit to stand transfer with modified independent level of assistance and least restrictive device in order to improve safety and prepare for out of bed mobility.       Start:   07/25/24    Expected End:  08/08/24

## 2024-07-25 NOTE — ANESTHESIA PREPROCEDURE EVALUATION
"Patient: Gigi Carver \"Rui\"    Procedure Information       Date/Time: 07/25/24 1530    Scheduled providers: Jose E Myles MD    Procedure: EGD    Location: [facility]            Relevant Problems   Cardiac   (+) Superior mesenteric artery stenosis (Multi)      Pulmonary   (+) COPD exacerbation (Multi)   (+) COPD with acute exacerbation (Multi)      Neuro   (+) Anxiety   (+) Dementia (Multi)   (+) Recurrent major depressive disorder (CMS-HCC)      /Renal   (+) Acute cystitis without hematuria   (+) Benign prostatic hyperplasia without lower urinary tract symptoms      Liver   (+) Cirrhosis of liver without ascites (Multi)      Hematology   (+) Pancytopenia (Multi)       Clinical information reviewed:   Tobacco  Allergies  Meds  Problems  Med Hx  Surg Hx   Fam Hx  Soc   Hx        NPO Detail:  No data recorded     Physical Exam    Airway  Mallampati: I  TM distance: >3 FB  Neck ROM: full     Cardiovascular - normal exam     Dental   (+) upper dentures, lower dentures     Pulmonary - normal exam     Abdominal - normal exam             Anesthesia Plan    History of general anesthesia?: yes  History of complications of general anesthesia?: no    ASA 4     MAC     The patient is a current smoker.  Patient was previously instructed to abstain from smoking on day of procedure.  Patient did not smoke on day of procedure.    intravenous induction       "

## 2024-07-25 NOTE — PROGRESS NOTES
Rui Carver is a 79 y.o. male on day 1 of admission presenting with COPD exacerbation (Multi).      Subjective   Pt assessed at bedside. Agitated that he is still here and waiting for EGD. Pt is forgetful and argumentative. Daughter is at bedside, the two of them seem to argue as well.        Objective     Last Recorded Vitals  BP (!) 157/91 (BP Location: Right arm, Patient Position: Lying)   Pulse 92   Temp 36.8 °C (98.2 °F) (Temporal)   Resp 16   Wt 69.7 kg (153 lb 10.6 oz)   SpO2 98%   Intake/Output last 3 Shifts:    Intake/Output Summary (Last 24 hours) at 7/25/2024 1119  Last data filed at 7/24/2024 1800  Gross per 24 hour   Intake 730 ml   Output 150 ml   Net 580 ml       Admission Weight  Weight: 69.7 kg (153 lb 10.6 oz) (07/23/24 1153)    Daily Weight  07/23/24 : 69.7 kg (153 lb 10.6 oz)    Image Results  ECG 12 lead  Sinus rhythm with Premature atrial complexes with Aberrant conduction  Right bundle branch block  T wave abnormality, consider lateral ischemia  Abnormal ECG  When compared with ECG of 23-JUL-2024 11:59,  Aberrant conduction is now Present  Questionable change in QRS axis  Nonspecific T wave abnormality has replaced inverted T waves in Inferior leads  See ED provider note for full interpretation and clinical correlation  Confirmed by Tiffany Giron ([id number]) on 7/24/2024 5:52:58 PM      Physical Exam  Vitals reviewed.   HENT:      Head: Normocephalic and atraumatic.      Right Ear: External ear normal.      Left Ear: External ear normal.      Nose: Nose normal.      Mouth/Throat:      Pharynx: Oropharynx is clear.   Eyes:      Conjunctiva/sclera: Conjunctivae normal.   Cardiovascular:      Rate and Rhythm: Normal rate and regular rhythm.      Pulses: Normal pulses.      Heart sounds: Normal heart sounds.   Pulmonary:      Breath sounds: Decreased breath sounds present.   Abdominal:      General: Bowel sounds are normal.      Palpations: Abdomen is soft.   Musculoskeletal:          General: Normal range of motion.      Cervical back: Normal range of motion and neck supple.   Skin:     General: Skin is dry.   Neurological:      General: No focal deficit present.      Mental Status: He is alert.      Comments: Forgetful    Psychiatric:         Mood and Affect: Mood is depressed.         Behavior: Behavior is agitated.         Cognition and Memory: Memory is impaired.         Relevant Results  Scheduled medications  aspirin, 81 mg, oral, Daily  azithromycin, 500 mg, intravenous, q24h  [Held by provider] enoxaparin, 40 mg, subcutaneous, q24h  hydrOXYzine HCL, 25 mg, oral, BID  methylPREDNISolone sodium succinate (PF), 40 mg, intravenous, q8h WILL  nicotine, 1 patch, transdermal, Daily  OLANZapine, 5 mg, oral, Nightly  pantoprazole, 40 mg, oral, Daily before breakfast  sucralfate, 1 g, oral, q6h WILL  tamsulosin, 0.4 mg, oral, Daily      Continuous medications  sodium chloride 0.9%, 10 mL/hr      PRN medications  PRN medications: acetaminophen, acetaminophen, albuterol, dicyclomine, ondansetron, oxygen, polyethylene glycol, prochlorperazine **OR** prochlorperazine, sodium chloride 0.9%    Results for orders placed or performed during the hospital encounter of 07/23/24 (from the past 24 hour(s))   CBC   Result Value Ref Range    WBC 5.2 4.4 - 11.3 x10*3/uL    nRBC 0.0 0.0 - 0.0 /100 WBCs    RBC 3.94 (L) 4.50 - 5.90 x10*6/uL    Hemoglobin 13.6 13.5 - 17.5 g/dL    Hematocrit 41.0 41.0 - 52.0 %     (H) 80 - 100 fL    MCH 34.5 (H) 26.0 - 34.0 pg    MCHC 33.2 32.0 - 36.0 g/dL    RDW 14.1 11.5 - 14.5 %    Platelets 110 (L) 150 - 450 x10*3/uL   Basic Metabolic Panel   Result Value Ref Range    Glucose 138 (H) 74 - 99 mg/dL    Sodium 141 136 - 145 mmol/L    Potassium 4.5 3.5 - 5.3 mmol/L    Chloride 102 98 - 107 mmol/L    Bicarbonate 39 (H) 21 - 32 mmol/L    Anion Gap <7 (L) 10 - 20 mmol/L    Urea Nitrogen 26 (H) 6 - 23 mg/dL    Creatinine 0.75 0.50 - 1.30 mg/dL    eGFR >90 >60 mL/min/1.73m*2     Calcium 8.8 8.6 - 10.3 mg/dL          Assessment/Plan        Principal Problem:    COPD exacerbation (Multi)  Active Problems:    Cirrhosis of liver without ascites (Multi)    Acute hypoxic respiratory failure (Multi)    Tobacco use disorder    Generalized abdominal pain    Superior mesenteric artery stenosis (Multi)    Acute cystitis without hematuria    Benign prostatic hyperplasia without lower urinary tract symptoms    Pancytopenia (Multi)    Recurrent major depressive disorder (CMS-HCC)    Dementia (Multi)    Anxiety    COPD with acute exacerbation (Multi)    #Presumed COPD exacerbation  #Acute hypoxic respiratory failure   #Tobacco use disorder   -CT chest: Severe emphysema with multiple pulmonary nodules are unchanged.  No focal regions of airspace consolidation.  Small amount of mucus/debris within the distal trachea is new compared  to exam 7/15/2024  -WBC 3.5  -Lactate 1.2  -Supplemental oxygen to maintain an sp02 greater than 92%  -Currently on 2L  -Baseline RA  -Nicotine patch   -Solumedrol q8  -Bronchodilators   -IV azithro (Day 3)  -RT to eval/treat  -Encourage cessation     #Abdominal pain  #Liver cirrhosis   #Superior mesenteric stenosis   -Recently seen by GI and Vascular at [facility]  -Rec endoscopy outpatient for variceal monitoring   -Rec follow up with Vasc for SMA stenosis   -CT angiogram: 1. Mild pericardial effusion with a thickness measuring 1.0 cm.  2. Moderate to severe centrilobular emphysema.  3. There is thickening of the distal esophagus measuring up to 0.8 cm.  There is thickening of the gastric wall. Correlation regarding  gastritis is suggested.  4. Nodular contour of the liver may be associated with cirrhosis. No  hepatic mass or perihepatic ascites.  5. Prominent portal vessels measuring up to 1.3 cm.  6. Scattered colonic diverticulosis without acute diverticulitis.  7. Abnormal bladder wall thickening measuring up to 0.7 cm.  Correlation regarding cystitis is suggested.  8.  Prominent prostate measuring 4.5 x 4.8 cm.  9. Small umbilical hernia containing fat only.  10. 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  -GI consult, appreciate recs   -Acute care surgery consult, appreciate recs   -Plan for EGD today   -Continue PPI, sucralfate   -Continue ASA     #UTI, resolved   #BPH  -Urine cx from 7/12 grew enterococcus faecalis resistant to cipro  -Was dc on cipro  -Start PO augmentin; discontinue   - leuks, 1-5 WBC   -Urine cx negative   -Continue tamsulosin      #Pancytopenia  -WBC 2.2  > 5.2  -RBC 3.80 > 3.94  -H/H 13.3/40.1 > 13.6/41.0  -Plt 85 > 110  -Hold AC, lovenox   -Daily CBC  -Monitor   -Outpatient follow up with hepatology      #Hx of concussion with brain injury   #Concern for dementia  #Anxiety   #Depression  -Safety precautions  -PT/OT evals  -Psych consult, appreciate recs   -Continue hydroxyzine, olanzapine     DVT ppx  -ASA     PUD ppx  -pantoprazole     F: PRN  E: Replete per protocol  N: Regular   A: PIV     Disposition: Pt requires less than 2 inpatient days at this time   Code Status: DNR/DNI        MARCELO Guajardo-CARLITA

## 2024-07-26 PROBLEM — K29.80 DUODENITIS: Status: ACTIVE | Noted: 2024-07-26

## 2024-07-26 LAB
ANION GAP SERPL CALC-SCNC: 11 MMOL/L (ref 10–20)
BUN SERPL-MCNC: 27 MG/DL (ref 6–23)
C DIF TOX TCDA+TCDB STL QL NAA+PROBE: NOT DETECTED
CALCIUM SERPL-MCNC: 8.1 MG/DL (ref 8.6–10.3)
CHLORIDE SERPL-SCNC: 102 MMOL/L (ref 98–107)
CO2 SERPL-SCNC: 31 MMOL/L (ref 21–32)
CREAT SERPL-MCNC: 0.65 MG/DL (ref 0.5–1.3)
EGFRCR SERPLBLD CKD-EPI 2021: >90 ML/MIN/1.73M*2
ERYTHROCYTE [DISTWIDTH] IN BLOOD BY AUTOMATED COUNT: 14.3 % (ref 11.5–14.5)
GLUCOSE SERPL-MCNC: 223 MG/DL (ref 74–99)
HCT VFR BLD AUTO: 36.8 % (ref 41–52)
HGB BLD-MCNC: 12 G/DL (ref 13.5–17.5)
MCH RBC QN AUTO: 34.2 PG (ref 26–34)
MCHC RBC AUTO-ENTMCNC: 32.6 G/DL (ref 32–36)
MCV RBC AUTO: 105 FL (ref 80–100)
NRBC BLD-RTO: 0 /100 WBCS (ref 0–0)
PLATELET # BLD AUTO: 92 X10*3/UL (ref 150–450)
POTASSIUM SERPL-SCNC: 4.1 MMOL/L (ref 3.5–5.3)
RBC # BLD AUTO: 3.51 X10*6/UL (ref 4.5–5.9)
SODIUM SERPL-SCNC: 140 MMOL/L (ref 136–145)
WBC # BLD AUTO: 3.3 X10*3/UL (ref 4.4–11.3)

## 2024-07-26 PROCEDURE — 99232 SBSQ HOSP IP/OBS MODERATE 35: CPT | Performed by: SURGERY

## 2024-07-26 PROCEDURE — 36415 COLL VENOUS BLD VENIPUNCTURE: CPT | Mod: IPSPLIT

## 2024-07-26 PROCEDURE — 99232 SBSQ HOSP IP/OBS MODERATE 35: CPT

## 2024-07-26 PROCEDURE — 2500000004 HC RX 250 GENERAL PHARMACY W/ HCPCS (ALT 636 FOR OP/ED): Mod: IPSPLIT | Performed by: NURSE PRACTITIONER

## 2024-07-26 PROCEDURE — 85027 COMPLETE CBC AUTOMATED: CPT | Mod: IPSPLIT

## 2024-07-26 PROCEDURE — 2500000002 HC RX 250 W HCPCS SELF ADMINISTERED DRUGS (ALT 637 FOR MEDICARE OP, ALT 636 FOR OP/ED): Mod: IPSPLIT

## 2024-07-26 PROCEDURE — 1100000001 HC PRIVATE ROOM DAILY: Mod: IPSPLIT

## 2024-07-26 PROCEDURE — 2500000005 HC RX 250 GENERAL PHARMACY W/O HCPCS: Mod: IPSPLIT | Performed by: NURSE PRACTITIONER

## 2024-07-26 PROCEDURE — S4991 NICOTINE PATCH NONLEGEND: HCPCS | Mod: IPSPLIT

## 2024-07-26 PROCEDURE — 2500000001 HC RX 250 WO HCPCS SELF ADMINISTERED DRUGS (ALT 637 FOR MEDICARE OP): Mod: IPSPLIT | Performed by: NURSE PRACTITIONER

## 2024-07-26 PROCEDURE — 9420000001 HC RT PATIENT EDUCATION 5 MIN: Mod: IPSPLIT

## 2024-07-26 PROCEDURE — 2500000002 HC RX 250 W HCPCS SELF ADMINISTERED DRUGS (ALT 637 FOR MEDICARE OP, ALT 636 FOR OP/ED): Mod: IPSPLIT | Performed by: NURSE PRACTITIONER

## 2024-07-26 PROCEDURE — 94760 N-INVAS EAR/PLS OXIMETRY 1: CPT | Mod: IPSPLIT

## 2024-07-26 PROCEDURE — 80048 BASIC METABOLIC PNL TOTAL CA: CPT | Mod: IPSPLIT

## 2024-07-26 ASSESSMENT — PAIN - FUNCTIONAL ASSESSMENT
PAIN_FUNCTIONAL_ASSESSMENT: 0-10

## 2024-07-26 ASSESSMENT — PAIN SCALES - GENERAL
PAINLEVEL_OUTOF10: 0 - NO PAIN

## 2024-07-26 NOTE — PROGRESS NOTES
"Physical Therapy                 Therapy Communication Note    Patient Name: Gigi Carver \"Rui\"  MRN: [id number]  Today's Date: 7/26/2024     Discipline: Physical Therapy    Missed Visit Reason: Missed Visit Reason: Patient refused (Attempted PT, patient refused. Provided encouragement for participation however, patient yelled no and refused to open his eyes. Informed nurse, will inform supervising therapist.)    Missed Time: Attempt 0825      "

## 2024-07-26 NOTE — PROGRESS NOTES
07/26/24 1345   Discharge Planning   Home or Post Acute Services Post acute facilities (Rehab/SNF/etc)   Type of Post Acute Facility Services Skilled nursing   Type of Home Care Services DME or oxygen;Home health aide;Home nursing visits;Home OT;Home PT;Meals on Wheels   Expected Discharge Disposition SNF   Does the patient need discharge transport arranged? Yes   RoundTrip coordination needed? Yes   Has discharge transport been arranged? No     Referral sent to [facility].  Patient did not participate with therapy.  [facility] will not accept until patient participates with therapy.  Referral will be relooked at on Monday.  Team updated.  There are no insurance barriers for patient to be discharged on 7/27.  TCC to follow up on Monday.      DC PLAN:  Not secure - Needs acceptance at [facility]

## 2024-07-26 NOTE — SIGNIFICANT EVENT
Attempted to interview patient again for depression, and grief.  Patient sleeping, yelling at the nurse when she attempts to wake him up.  Spoke to daughter on phone and advised her to have patient follow up outpatient for depression and grief, she verbalized understanding.  Discussed with MARCELO Guajardo-CNP.      MARCELO Uriostegui, CNP, PMHNP

## 2024-07-26 NOTE — PROGRESS NOTES
"Physical Therapy                 Therapy Communication Note    Patient Name: Gigi Carver \"Rui\"  MRN: [id number]  Today's Date: 7/26/2024     Discipline: Physical Therapy    Missed Visit Reason: Missed Visit Reason: Patient refused (Attempted PT, patient refused. Provided encouragement for participation, however, patient continued to refuse, refusing to open his eyes. Informed nurse and supervising therapist.)    Missed Time: Attempt 1035      "

## 2024-07-26 NOTE — PROGRESS NOTES
"Rui Carver is a 79 y.o. male on day 2 of admission presenting with COPD exacerbation (Multi).    Subjective   Status post EGD 7/25/2024.  Noted to have duodenitis within duodenal bulb.  Pain improved with Carafate       Objective     Physical Exam  Constitutional:       Appearance: Normal appearance.   Abdominal:      Palpations: Abdomen is soft. There is no mass.      Tenderness: There is no abdominal tenderness. There is no guarding.         Last Recorded Vitals  Blood pressure (!) 154/94, pulse 95, temperature 36.6 °C (97.9 °F), temperature source Temporal, resp. rate 18, height 1.702 m (5' 7\"), weight 69.7 kg (153 lb 10.6 oz), SpO2 94%.  Intake/Output last 3 Shifts:  I/O last 3 completed shifts:  In: 730 (10.5 mL/kg) [P.O.:480; IV Piggyback:250]  Out: 150 (2.2 mL/kg) [Urine:150 (0.1 mL/kg/hr)]  Weight: 69.7 kg     Relevant Results                             Assessment/Plan   Principal Problem:    COPD exacerbation (Multi)  Active Problems:    Cirrhosis of liver without ascites (Multi)    Acute hypoxic respiratory failure (Multi)    Tobacco use disorder    Generalized abdominal pain    Superior mesenteric artery stenosis (Multi)    Acute cystitis without hematuria    Benign prostatic hyperplasia without lower urinary tract symptoms    Pancytopenia (Multi)    Recurrent major depressive disorder (CMS-HCC)    Dementia (Multi)    Anxiety    COPD with acute exacerbation (Multi)    Plan-continue proton pump inhibitor daily.  Continue Carafate twice daily.  Smoking cessation.  Follow as needed.      Jose E Myles MD      "

## 2024-07-26 NOTE — SIGNIFICANT EVENT
Attempted to see patient for consult via IPAD, patient would not wake up for nurse.  Per nurse patient was awake all night.  Discussed with charge nurse, will attempt again in a couple hours.      MARCELO Uriostegui, CNP, PMHNP

## 2024-07-26 NOTE — CARE PLAN
The patient's goals for the shift include pt with little to no sleep overnight. Pt more forgetful and thinks he is at home in his kitchen. Pt pulling/ messing with wires to tele and pulseox monitors

## 2024-07-26 NOTE — CARE PLAN
The patient's goals for the shift include sleep    The clinical goals for the shift include Pt will be free of injury today    Pt slept most of the day--he was uncooperative when he would get up to urinate--very unsteady and impulsive. He was incont bowel and bladder.  He did not eat any meals and refused meds today. Pt would get combative with pt care or trying to change tele leads. Tele was Dcd. Bed alarm on. Psych consult was not able be cause pt not answering or waking to talk. Daughter says he does sleep all day sometimes if he was awake the night before.

## 2024-07-26 NOTE — PROGRESS NOTES
Rui Carver is a 79 y.o. male on day 2 of admission presenting with COPD exacerbation (Multi).      Subjective   Pt resting in bed upon examination, drowsy but answers questions as asked. Pt denies any pain or complaints this morning. Currently wearing 2L of oxygen.        Objective     Last Recorded Vitals  /73 (BP Location: Right arm, Patient Position: Lying)   Pulse 78   Temp 36.8 °C (98.2 °F) (Temporal)   Resp 17   Wt 69.7 kg (153 lb 10.6 oz)   SpO2 92%   Intake/Output last 3 Shifts:    Intake/Output Summary (Last 24 hours) at 7/26/2024 0905  Last data filed at 7/26/2024 0501  Gross per 24 hour   Intake 994 ml   Output --   Net 994 ml       Admission Weight  Weight: 69.7 kg (153 lb 10.6 oz) (07/23/24 1153)    Daily Weight  07/23/24 : 69.7 kg (153 lb 10.6 oz)    Image Results  Esophagogastroduodenoscopy (EGD)  Table formatting from the original result was not included.  Impression  Moderate edematous, erythematous mucosa in the duodenal bulb; performed   cold forceps biopsy    Findings  Moderate, generalized edematous and erythematous mucosa in the duodenal   bulb; no bleeding was identified; performed cold forceps biopsy  Regular Z-line 38 cm from the incisors    Recommendation  Await pathology results   Follow up with PCP   Continue Carafate and PPI        Indication  Epigastric pain    Staff  Staff Role   Jose E Myles MD Proceduralist     Medications  See Anesthesia Record.     Preprocedure  A history and physical has been performed, and patient medication   allergies have been reviewed. The patient's tolerance of previous   anesthesia has been reviewed. The risks and benefits of the procedure and   the sedation options and risks were discussed with the patient. All   questions were answered and informed consent obtained.    Details of the Procedure  The patient underwent monitored anesthesia care, which was administered by   an anesthesia professional. The patient's blood pressure, ECG, ETCO2,    heart rate, level of consciousness, oxygen and respirations were monitored   throughout the procedure. The scope was introduced through the mouth and   advanced to the second part of the duodenum. Retroflexion was performed in   the cardia. Prior to the procedure, the patient's H. Pylori status was   unknown. The patient experienced no blood loss. The procedure was not   difficult. The patient tolerated the procedure well. There were no   apparent adverse events.     Events  Procedure Events   Event Event Time   ENDO SCOPE IN TIME 7/25/2024  3:53 PM   ENDO SCOPE OUT TIME 7/25/2024  3:57 PM     Specimens  ID Type Source Tests Collected by Time   1 : DUODENAL BULB BIOPSY-COLD BX Tissue DUODENAL BULB  BIOPSY SURGICAL   PATHOLOGY EXAM Jose E Myles MD 7/25/2024 1555     Procedure Location  [facility]  [address] [address] [phone]    Referring Provider  Jose E Myles MD    Procedure Provider  Jose E Myles MD      Physical Exam  Vitals reviewed.   HENT:      Head: Normocephalic and atraumatic.      Right Ear: External ear normal.      Left Ear: External ear normal.      Nose: Nose normal.      Mouth/Throat:      Pharynx: Oropharynx is clear.   Eyes:      Conjunctiva/sclera: Conjunctivae normal.   Cardiovascular:      Rate and Rhythm: Normal rate and regular rhythm.      Pulses: Normal pulses.      Heart sounds: Normal heart sounds.   Pulmonary:      Breath sounds: Decreased breath sounds present.   Abdominal:      General: Bowel sounds are normal.      Palpations: Abdomen is soft.   Musculoskeletal:         General: Normal range of motion.      Cervical back: Normal range of motion and neck supple.   Skin:     General: Skin is dry.   Neurological:      General: No focal deficit present.      Mental Status: He is alert.      Comments: forgetful   Psychiatric:         Behavior: Behavior normal.         Relevant Results  Scheduled medications  aspirin, 81 mg,  oral, Daily  [Held by provider] enoxaparin, 40 mg, subcutaneous, q24h  hydrOXYzine HCL, 25 mg, oral, BID  methylPREDNISolone sodium succinate (PF), 40 mg, intravenous, q8h WILL  nicotine, 1 patch, transdermal, Daily  OLANZapine, 5 mg, oral, Nightly  pantoprazole, 40 mg, oral, Daily before breakfast  sucralfate, 1 g, oral, q6h WILL  tamsulosin, 0.4 mg, oral, Daily      Continuous medications  sodium chloride 0.9%, 10 mL/hr, Last Rate: Stopped (07/25/24 1900)      PRN medications  PRN medications: acetaminophen, acetaminophen, albuterol, dicyclomine, ondansetron, oxygen, polyethylene glycol, prochlorperazine **OR** prochlorperazine, sodium chloride 0.9%    Results for orders placed or performed during the hospital encounter of 07/23/24 (from the past 24 hour(s))   CBC   Result Value Ref Range    WBC 3.3 (L) 4.4 - 11.3 x10*3/uL    nRBC 0.0 0.0 - 0.0 /100 WBCs    RBC 3.51 (L) 4.50 - 5.90 x10*6/uL    Hemoglobin 12.0 (L) 13.5 - 17.5 g/dL    Hematocrit 36.8 (L) 41.0 - 52.0 %     (H) 80 - 100 fL    MCH 34.2 (H) 26.0 - 34.0 pg    MCHC 32.6 32.0 - 36.0 g/dL    RDW 14.3 11.5 - 14.5 %    Platelets 92 (L) 150 - 450 x10*3/uL   Basic Metabolic Panel   Result Value Ref Range    Glucose 223 (H) 74 - 99 mg/dL    Sodium 140 136 - 145 mmol/L    Potassium 4.1 3.5 - 5.3 mmol/L    Chloride 102 98 - 107 mmol/L    Bicarbonate 31 21 - 32 mmol/L    Anion Gap 11 10 - 20 mmol/L    Urea Nitrogen 27 (H) 6 - 23 mg/dL    Creatinine 0.65 0.50 - 1.30 mg/dL    eGFR >90 >60 mL/min/1.73m*2    Calcium 8.1 (L) 8.6 - 10.3 mg/dL          Assessment/Plan        Principal Problem:    COPD exacerbation (Multi)  Active Problems:    Cirrhosis of liver without ascites (Multi)    Acute hypoxic respiratory failure (Multi)    Tobacco use disorder    Generalized abdominal pain    Superior mesenteric artery stenosis (Multi)    Acute cystitis without hematuria    Benign prostatic hyperplasia without lower urinary tract symptoms    Pancytopenia (Multi)    Recurrent  major depressive disorder (CMS-HCC)    Dementia (Multi)    Anxiety    COPD with acute exacerbation (Multi)    #COPD exacerbation  #Acute hypoxic respiratory failure   #Tobacco use disorder   -CT chest: Severe emphysema with multiple pulmonary nodules are unchanged.  No focal regions of airspace consolidation.  Small amount of mucus/debris within the distal trachea is new compared  to exam 7/15/2024  -WBC 3.5  -Lactate 1.2  -Supplemental oxygen to maintain an sp02 greater than 92%  -Currently on 2L  -Baseline RA  -Nicotine patch   -Solumedrol q8  -Bronchodilators   -Completed IV azithro   -RT to eval/treat  -Encourage cessation     #Duodenitis   #Liver cirrhosis   #Superior mesenteric stenosis   -Recently seen by GI and Vascular at [facility]  -Rec follow up with Vasc for SMA stenosis   -CT angiogram: 1. Mild pericardial effusion with a thickness measuring 1.0 cm.  2. Moderate to severe centrilobular emphysema.  3. There is thickening of the distal esophagus measuring up to 0.8 cm.  There is thickening of the gastric wall. Correlation regarding  gastritis is suggested.  4. Nodular contour of the liver may be associated with cirrhosis. No  hepatic mass or perihepatic ascites.  5. Prominent portal vessels measuring up to 1.3 cm.  6. Scattered colonic diverticulosis without acute diverticulitis.  7. Abnormal bladder wall thickening measuring up to 0.7 cm.  Correlation regarding cystitis is suggested.  8. Prominent prostate measuring 4.5 x 4.8 cm.  9. Small umbilical hernia containing fat only.  10. 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  -GI consult, appreciate recs   -Acute care surgery consult, appreciate recs   -EGD showed duodenitis within duodenal bulb; will continue PPI and carafate indefinitely per Dr. Myles   -Continue PPI, sucralfate   -Continue ASA     #UTI, resolved   #BPH  -Urine cx from 7/12 grew enterococcus faecalis resistant to cipro  -Was dc on cipro  -Start  PO augmentin; discontinue   - leuks, 1-5 WBC   -Urine cx negative   -Continue tamsulosin      #Pancytopenia, improving   -WBC 2.2  > 5.2 > 3.3   -RBC 3.80 > 3.94 > 3.51  -H/H 13.3/40.1 > 13.6/41.0 > 12.0/36.8  -Plt 85 > 110 > 92  -Hold AC, lovenox   -Daily CBC  -Monitor   -Outpatient follow up with hepatology      #Hx of concussion with brain injury   #Concern for dementia  #Anxiety   #Depression  -Safety precautions  -PT/OT evals  -Psych consult, appreciate recs   -Continue hydroxyzine, olanzapine     DVT ppx  -ASA     PUD ppx  -pantoprazole     F: PRN  E: Replete per protocol  N: Regular   A: PIV     Disposition: Pt requires less than 2 inpatient days at this time   Code Status: DNR/DNI          Rachael Gonzalez, APRN-CNP

## 2024-07-27 LAB
ANION GAP SERPL CALC-SCNC: 9 MMOL/L (ref 10–20)
BACTERIA BLD CULT: NORMAL
BACTERIA BLD CULT: NORMAL
BUN SERPL-MCNC: 26 MG/DL (ref 6–23)
CALCIUM SERPL-MCNC: 8.8 MG/DL (ref 8.6–10.3)
CHLORIDE SERPL-SCNC: 100 MMOL/L (ref 98–107)
CO2 SERPL-SCNC: 39 MMOL/L (ref 21–32)
CREAT SERPL-MCNC: 0.67 MG/DL (ref 0.5–1.3)
EGFRCR SERPLBLD CKD-EPI 2021: >90 ML/MIN/1.73M*2
ERYTHROCYTE [DISTWIDTH] IN BLOOD BY AUTOMATED COUNT: 13.8 % (ref 11.5–14.5)
GLUCOSE SERPL-MCNC: 121 MG/DL (ref 74–99)
HCT VFR BLD AUTO: 42 % (ref 41–52)
HGB BLD-MCNC: 13.8 G/DL (ref 13.5–17.5)
MCH RBC QN AUTO: 34.2 PG (ref 26–34)
MCHC RBC AUTO-ENTMCNC: 32.9 G/DL (ref 32–36)
MCV RBC AUTO: 104 FL (ref 80–100)
NRBC BLD-RTO: 0 /100 WBCS (ref 0–0)
PLATELET # BLD AUTO: 105 X10*3/UL (ref 150–450)
POTASSIUM SERPL-SCNC: 4 MMOL/L (ref 3.5–5.3)
RBC # BLD AUTO: 4.04 X10*6/UL (ref 4.5–5.9)
SODIUM SERPL-SCNC: 144 MMOL/L (ref 136–145)
WBC # BLD AUTO: 3.5 X10*3/UL (ref 4.4–11.3)

## 2024-07-27 PROCEDURE — 99233 SBSQ HOSP IP/OBS HIGH 50: CPT | Performed by: NURSE PRACTITIONER

## 2024-07-27 PROCEDURE — 36415 COLL VENOUS BLD VENIPUNCTURE: CPT | Mod: IPSPLIT

## 2024-07-27 PROCEDURE — 85027 COMPLETE CBC AUTOMATED: CPT | Mod: IPSPLIT

## 2024-07-27 PROCEDURE — 97110 THERAPEUTIC EXERCISES: CPT | Mod: GP,IPSPLIT | Performed by: PHYSICAL THERAPIST

## 2024-07-27 PROCEDURE — 94760 N-INVAS EAR/PLS OXIMETRY 1: CPT | Mod: IPSPLIT

## 2024-07-27 PROCEDURE — 2500000004 HC RX 250 GENERAL PHARMACY W/ HCPCS (ALT 636 FOR OP/ED): Mod: IPSPLIT | Performed by: NURSE PRACTITIONER

## 2024-07-27 PROCEDURE — 2500000002 HC RX 250 W HCPCS SELF ADMINISTERED DRUGS (ALT 637 FOR MEDICARE OP, ALT 636 FOR OP/ED): Mod: IPSPLIT

## 2024-07-27 PROCEDURE — 2500000001 HC RX 250 WO HCPCS SELF ADMINISTERED DRUGS (ALT 637 FOR MEDICARE OP): Mod: IPSPLIT

## 2024-07-27 PROCEDURE — 2500000002 HC RX 250 W HCPCS SELF ADMINISTERED DRUGS (ALT 637 FOR MEDICARE OP, ALT 636 FOR OP/ED): Mod: IPSPLIT | Performed by: NURSE PRACTITIONER

## 2024-07-27 PROCEDURE — 94640 AIRWAY INHALATION TREATMENT: CPT | Mod: IPSPLIT

## 2024-07-27 PROCEDURE — S4991 NICOTINE PATCH NONLEGEND: HCPCS | Mod: IPSPLIT

## 2024-07-27 PROCEDURE — 97535 SELF CARE MNGMENT TRAINING: CPT | Mod: GO,IPSPLIT

## 2024-07-27 PROCEDURE — 97116 GAIT TRAINING THERAPY: CPT | Mod: GP,IPSPLIT | Performed by: PHYSICAL THERAPIST

## 2024-07-27 PROCEDURE — 1100000001 HC PRIVATE ROOM DAILY: Mod: IPSPLIT

## 2024-07-27 PROCEDURE — 2500000002 HC RX 250 W HCPCS SELF ADMINISTERED DRUGS (ALT 637 FOR MEDICARE OP, ALT 636 FOR OP/ED): Mod: IPSPLIT | Performed by: INTERNAL MEDICINE

## 2024-07-27 PROCEDURE — 2500000005 HC RX 250 GENERAL PHARMACY W/O HCPCS: Mod: IPSPLIT | Performed by: NURSE PRACTITIONER

## 2024-07-27 PROCEDURE — 2500000001 HC RX 250 WO HCPCS SELF ADMINISTERED DRUGS (ALT 637 FOR MEDICARE OP): Mod: IPSPLIT | Performed by: NURSE PRACTITIONER

## 2024-07-27 PROCEDURE — 99232 SBSQ HOSP IP/OBS MODERATE 35: CPT | Performed by: SURGERY

## 2024-07-27 PROCEDURE — 94667 MNPJ CHEST WALL 1ST: CPT | Mod: IPSPLIT

## 2024-07-27 PROCEDURE — 80048 BASIC METABOLIC PNL TOTAL CA: CPT | Mod: IPSPLIT

## 2024-07-27 ASSESSMENT — COGNITIVE AND FUNCTIONAL STATUS - GENERAL
STANDING UP FROM CHAIR USING ARMS: A LITTLE
CLIMB 3 TO 5 STEPS WITH RAILING: A LOT
DRESSING REGULAR UPPER BODY CLOTHING: A LITTLE
DRESSING REGULAR UPPER BODY CLOTHING: A LOT
TOILETING: A LITTLE
DAILY ACTIVITIY SCORE: 19
DRESSING REGULAR LOWER BODY CLOTHING: A LITTLE
DAILY ACTIVITIY SCORE: 17
MOBILITY SCORE: 20
WALKING IN HOSPITAL ROOM: A LITTLE
DRESSING REGULAR UPPER BODY CLOTHING: A LITTLE
TURNING FROM BACK TO SIDE WHILE IN FLAT BAD: A LITTLE
HELP NEEDED FOR BATHING: A LOT
DRESSING REGULAR LOWER BODY CLOTHING: A LITTLE
HELP NEEDED FOR BATHING: A LOT
DRESSING REGULAR LOWER BODY CLOTHING: A LITTLE
PERSONAL GROOMING: A LITTLE
DAILY ACTIVITIY SCORE: 15
TOILETING: A LOT
HELP NEEDED FOR BATHING: A LITTLE
TOILETING: A LOT
PERSONAL GROOMING: A LOT
STANDING UP FROM CHAIR USING ARMS: A LITTLE
MOVING FROM LYING ON BACK TO SITTING ON SIDE OF FLAT BED WITH BEDRAILS: A LITTLE
MOVING TO AND FROM BED TO CHAIR: A LITTLE
MOBILITY SCORE: 16
CLIMB 3 TO 5 STEPS WITH RAILING: TOTAL
WALKING IN HOSPITAL ROOM: A LITTLE
PERSONAL GROOMING: A LITTLE

## 2024-07-27 ASSESSMENT — PAIN SCALES - GENERAL
PAINLEVEL_OUTOF10: 0 - NO PAIN

## 2024-07-27 ASSESSMENT — ACTIVITIES OF DAILY LIVING (ADL)
BATHING_WHERE_ASSESSED: EDGE OF BED
HOME_MANAGEMENT_TIME_ENTRY: 40

## 2024-07-27 ASSESSMENT — PAIN - FUNCTIONAL ASSESSMENT
PAIN_FUNCTIONAL_ASSESSMENT: 0-10

## 2024-07-27 NOTE — PROGRESS NOTES
"Rui Carver is a 79 y.o. male on day 3 of admission presenting with COPD exacerbation (Multi).    Subjective   Pt awake, speaks mostly about his  wife, seems very depressed. He then becomes very argumentative when asked why he won't participate with PT/OT, stating that his rehab stay has been worked out between \"some lady\" and his daughter and is none of my business. He seems very compulsive, unreasonable. I gently ended the conversation and walked out of the room so he would not escalate his behavior. Will continue to monitor.      Objective   Last Recorded Vitals  BP (!) 169/91 (BP Location: Right arm, Patient Position: Lying)   Pulse 74   Temp 36.6 °C (97.9 °F) (Temporal)   Resp 20   Wt 69.7 kg (153 lb 10.6 oz)   SpO2 92%   Intake/Output last 3 Shifts:    Intake/Output Summary (Last 24 hours) at 2024 1259  Last data filed at 2024 1015  Gross per 24 hour   Intake 640 ml   Output --   Net 640 ml     Admission Weight  Weight: 69.7 kg (153 lb 10.6 oz) (24 1153)    Daily Weight  24 : 69.7 kg (153 lb 10.6 oz)    Physical Exam  Vitals reviewed.   HENT:      Head: Normocephalic and atraumatic.      Right Ear: External ear normal.      Left Ear: External ear normal.      Nose: Nose normal.      Mouth/Throat:      Pharynx: Oropharynx is clear.   Eyes:      Conjunctiva/sclera: Conjunctivae normal.   Cardiovascular:      Rate and Rhythm: Normal rate and regular rhythm.      Pulses: Normal pulses.      Heart sounds: Normal heart sounds.   Pulmonary:      Breath sounds: Decreased breath sounds present.   Abdominal:      General: Bowel sounds are normal.      Palpations: Abdomen is soft.   Musculoskeletal:         General: Normal range of motion.      Cervical back: Normal range of motion and neck supple.   Skin:     General: Skin is dry.   Neurological:      General: No focal deficit present.      Mental Status: He is alert.      Comments: forgetful   Psychiatric:         Behavior: Behavior " normal.       Scheduled medications  aspirin, 81 mg, oral, Daily  [Held by provider] enoxaparin, 40 mg, subcutaneous, q24h  hydrOXYzine HCL, 25 mg, oral, BID  methylPREDNISolone sodium succinate (PF), 40 mg, intravenous, q8h WILL  nicotine, 1 patch, transdermal, Daily  OLANZapine, 5 mg, oral, Nightly  pantoprazole, 40 mg, oral, Daily before breakfast  sucralfate, 1 g, oral, q6h WILL  tamsulosin, 0.4 mg, oral, Daily    Continuous medications  sodium chloride 0.9%, 10 mL/hr, Last Rate: Stopped (07/25/24 1900)    PRN medications  PRN medications: acetaminophen, acetaminophen, albuterol, dicyclomine, ondansetron, oxygen, polyethylene glycol, prochlorperazine **OR** prochlorperazine, sodium chloride 0.9%    Relevant Results:  Esophagogastroduodenoscopy (EGD)  Result Date: 7/25/2024    ECG 12 lead  Result Date: 7/24/2024  Sinus rhythm with Premature atrial complexes with Aberrant conduction Right bundle branch block T wave abnormality, consider lateral ischemia Abnormal ECG When compared with ECG of 23-JUL-2024 11:59, Aberrant conduction is now Present Questionable change in QRS axis Nonspecific T wave abnormality has replaced inverted T waves in Inferior leads See ED provider note for full interpretation and clinical correlation Confirmed by Tiffany Giron ([id number]) on 7/24/2024 5:52:58 PM    CT angio abdomen pelvis w and or wo IV IV contrast  Result Date: 7/23/2024  1. Mild pericardial effusion with a thickness measuring 1.0 cm. 2. Moderate to severe centrilobular emphysema. 3. There is thickening of the distal esophagus measuring up to 0.8 cm. There is thickening of the gastric wall. Correlation regarding gastritis is suggested. 4. Nodular contour of the liver may be associated with cirrhosis. No hepatic mass or perihepatic ascites. 5. Prominent portal vessels measuring up to 1.3 cm. 6. Scattered colonic diverticulosis without acute diverticulitis. 7. Abnormal bladder wall thickening measuring up to 0.7 cm.  Correlation regarding cystitis is suggested. 8. Prominent prostate measuring 4.5 x 4.8 cm. 9. Small umbilical hernia containing fat only. 10. 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 Signed by Audie Mark MD    CT chest wo IV contrast  Result Date: 7/23/2024  Severe emphysema with multiple pulmonary nodules are unchanged. No focal regions of airspace consolidation. Small amount of mucus/debris within the distal trachea is new compared to exam 7/15/2024 Signed by Bridger Madden MD     Latest Reference Range & Units 07/25/24 05:11 07/26/24 07:29 07/27/24 06:42   GLUCOSE 74 - 99 mg/dL 138 (H) 223 (H) 121 (H)   SODIUM 136 - 145 mmol/L 141 140 144   POTASSIUM 3.5 - 5.3 mmol/L 4.5 4.1 4.0   CHLORIDE 98 - 107 mmol/L 102 102 100   Bicarbonate 21 - 32 mmol/L 39 (H) 31 39 (H)   Anion Gap 10 - 20 mmol/L <7 (L) 11 9 (L)   Blood Urea Nitrogen 6 - 23 mg/dL 26 (H) 27 (H) 26 (H)   Creatinine 0.50 - 1.30 mg/dL 0.75 0.65 0.67   EGFR >60 mL/min/1.73m*2 >90 >90 >90   Calcium 8.6 - 10.3 mg/dL 8.8 8.1 (L) 8.8   LEUKOCYTES (10*3/UL) IN BLOOD BY AUTOMATED COUNT, [place] 4.4 - 11.3 x10*3/uL 5.2 3.3 (L) 3.5 (L)   nRBC 0.0 - 0.0 /100 WBCs 0.0 0.0 0.0   ERYTHROCYTES (10*6/UL) IN BLOOD BY AUTOMATED COUNT, [place] 4.50 - 5.90 x10*6/uL 3.94 (L) 3.51 (L) 4.04 (L)   HEMOGLOBIN 13.5 - 17.5 g/dL 13.6 12.0 (L) 13.8   HEMATOCRIT 41.0 - 52.0 % 41.0 36.8 (L) 42.0   MCV 80 - 100 fL 104 (H) 105 (H) 104 (H)   MCH 26.0 - 34.0 pg 34.5 (H) 34.2 (H) 34.2 (H)   MCHC 32.0 - 36.0 g/dL 33.2 32.6 32.9   RED CELL DISTRIBUTION WIDTH 11.5 - 14.5 % 14.1 14.3 13.8   PLATELETS (10*3/UL) IN BLOOD AUTOMATED COUNT, [place] 150 - 450 x10*3/uL 110 (L) 92 (L) 105 (L)     Assessment/Plan    Principal Problem:    COPD exacerbation (Multi)  Active Problems:    Cirrhosis of liver without ascites (Multi)    Acute hypoxic respiratory failure (Multi)    Tobacco use disorder    Generalized abdominal pain    Superior mesenteric artery  stenosis (Multi)    Acute cystitis without hematuria    Benign prostatic hyperplasia without lower urinary tract symptoms    Pancytopenia (Multi)    Recurrent major depressive disorder (CMS-HCC)    Dementia (Multi)    Anxiety    COPD with acute exacerbation (Multi)    Duodenitis    #COPD exacerbation  #Acute hypoxic respiratory failure   #Tobacco use disorder   -CT chest: Severe emphysema with multiple pulmonary nodules are unchanged. No focal regions of airspace consolidation. Small amount of mucus/debris within the distal trachea is new compared to exam 7/15/2024  -WBC 3.5  -Lactate 1.2  -Supplemental oxygen to maintain an sp02 greater than 92%  -Currently on 2L, baseline RA  -Nicotine patch   -Solumedrol q8  -Bronchodilators   -Completed IV azithro   -RT to eval/treat  -Encourage cessation     #Duodenitis   #Liver cirrhosis   #Superior mesenteric stenosis   -Recently seen by GI and Vascular at [facility]  -Rec follow up with Vasc for SMA stenosis   -CT angiogram: 1. Mild pericardial effusion with a thickness measuring 1.0 cm. 2. Moderate to severe centrilobular emphysema. 3. There is thickening of the distal esophagus measuring up to 0.8 cm. There is thickening of the gastric wall. Correlation regarding gastritis is suggested. 4. Nodular contour of the liver may be associated with cirrhosis. No  hepatic mass or perihepatic ascites. 5. Prominent portal vessels measuring up to 1.3 cm. 6. Scattered colonic diverticulosis without acute diverticulitis. 7. Abnormal bladder wall thickening measuring up to 0.7 cm. Correlation regarding cystitis is suggested. 8. Prominent prostate measuring 4.5 x 4.8 cm. 9. Small umbilical hernia containing fat only. 10. 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  -GI consult, appreciate recs   -Acute care surgery consult, appreciate recs   -EGD showed duodenitis within duodenal bulb; will continue PPI and carafate indefinitely per Dr. Myles    -Continue PPI, sucralfate   -Continue ASA     #UTI, resolved   #BPH  -Urine cx from 7/12 grew enterococcus faecalis resistant to cipro  -Was dc on cipro  -Start PO augmentin; discontinue   - leuks, 1-5 WBC   -Urine cx negative   -Continue tamsulosin      #Pancytopenia, improving   -WBC 2.2  > 5.2 > 3.3 > 3.5  -RBC 3.80 > 3.94 > 3.51 > 4.04  -H/H 13.3/40.1 > 13.6/41.0 > 12.0/36.8 > 13.8/42.0  -Plt 85 > 110 > 92 > 105  -Hold AC, lovenox   -Daily CBC  -Monitor   -Outpatient follow up with hepatology      #Hx of concussion with brain injury   #Concern for dementia  #Anxiety   #Depression  -Safety precautions  -PT/OT evals  -Psych consult, appreciate recs   -Continue hydroxyzine, olanzapine     DVT ppx: ASA   PUD ppx: pantoprazole   F: PRN  E: Replete per protocol  N: Regular   A: PIV   Disposition: Pt requires less than 2 inpatient days at this time, will DC to [facility] Monday  Code Status: DNR/DNI     Mei Ornelas, APRN-CNP

## 2024-07-27 NOTE — PROGRESS NOTES
"Rui Carver is a 79 y.o. male on day 3 of admission presenting with COPD exacerbation (Multi).  And abdominal pain.  Has duodenitis of the bulb on EGD    Subjective   Complaining intermittently of pain.  On oral Carafate and PPI.    Objective     Physical Exam  Constitutional:       Appearance: Normal appearance.   Abdominal:      Palpations: Abdomen is soft. There is no mass.      Tenderness: There is no abdominal tenderness. There is no guarding.         Last Recorded Vitals  Blood pressure 174/89, pulse 70, temperature 36.8 °C (98.2 °F), temperature source Temporal, resp. rate 18, height 1.702 m (5' 7\"), weight 69.7 kg (153 lb 10.6 oz), SpO2 91%.  Intake/Output last 3 Shifts:  I/O last 3 completed shifts:  In: 1234 (17.7 mL/kg) [P.O.:480; I.V.:254 (3.6 mL/kg); IV Piggyback:500]  Out: - (0 mL/kg)   Weight: 69.7 kg     Relevant Results                             Assessment/Plan   Principal Problem:    COPD exacerbation (Multi)  Active Problems:    Cirrhosis of liver without ascites (Multi)    Acute hypoxic respiratory failure (Multi)    Tobacco use disorder    Generalized abdominal pain    Superior mesenteric artery stenosis (Multi)    Acute cystitis without hematuria    Benign prostatic hyperplasia without lower urinary tract symptoms    Pancytopenia (Multi)    Recurrent major depressive disorder (CMS-HCC)    Dementia (Multi)    Anxiety    COPD with acute exacerbation (Multi)    Duodenitis    Plan-continue PPI and Carafate.  No other intervention required at this time.  Smoking cessation.  Plans for discharge to rehabilitation      Jose E Myles MD      "

## 2024-07-27 NOTE — CARE PLAN
The patient's goals for the shift include pt with 3 episodes of diarrhea and ABD cramping this shift. Bentyl given.

## 2024-07-27 NOTE — SIGNIFICANT EVENT
At bedside when patient stated he was choking on hamburger. Patient breathing and talking, shortness of breath observed. Patient given hand postural drainage. Patient able to cough up a piece of food and feels better. SpO2 92% on 2L. Patient improved.

## 2024-07-27 NOTE — CARE PLAN
The patient's goals for the shift include sleep    The clinical goals for the shift include patient will maintain an SpO2 of 93% or higher this shift    Over the shift, the patient did make progress toward the following goals. Noted patient to be confused most of shift, easily re-oriented at this time. Receives respiratory therapy as needed. O2 in place. Noted patient to have some swallowing difficulties, see new order for ST eval. Patient's daughter at bedside. Patient denies pain.

## 2024-07-27 NOTE — PROGRESS NOTES
"Physical Therapy    Physical Therapy Evaluation & Treatment    Patient Name: Rui Carver  MRN: [id number]  Today's Date: 7/27/2024   Time Calculation  Start Time: 0902  Stop Time: 0926  Time Calculation (min): 24 min    Assessment/Plan   PT Assessment  PT Assessment Results: Decreased endurance, Impaired balance, Decreased mobility, Impaired judgement, Decreased safety awareness  Rehab Prognosis: Good  Medical Staff Made Aware: Yes  End of Session Communication: Bedside nurse  Assessment Comment: Pt sitting eob upon arrival reporting having to use restroom. Assisted patient to restroom. Pt able to ambulate with O2 25' x 2 with sba for safety, no device. Performed min seated exercises at bedside before returning to bed. Pt reports he ate too much breakfast and \"not feeling well\". Pt demonstrates improved ability to perform bed mobility, safe transfers and ambulation but still requiring assist due to impulsive movements and risk for falls. Pt left with all needs in place no adverse effects to treatment.   IP OR SWING BED PT PLAN  Inpatient or Swing Bed: Inpatient  PT Plan  Treatment/Interventions: Transfer training, Gait training, Stair training, Balance training, Neuromuscular re-education, Strengthening, Endurance training, Therapeutic exercise, Therapeutic activity  PT Plan: Ongoing PT  PT Frequency: 3 times per week  PT Discharge Recommendations: Moderate intensity level of continued care (mod based on safety at home alone and current impairments with balance and decision making. Pt's daughter would like patient to recieve more rehab after discharge.)  Equipment Recommended upon Discharge: Wheeled walker  PT Recommended Transfer Status: Assist x1  PT - OK to Discharge: Yes      Subjective     General Visit Information:  General  Referred By: MARCELO Guajardo-CNP;  Past Medical History Relevant to Rehab: masenteric ischemia, UTI, AMS, GERD  Prior to Session Communication: Bedside nurse  Patient Position " Received: Bed, 2 rail up  Preferred Learning Style: verbal, kinesthetic  General Comment: [place], O2. Pt reports not feeling well but request ot use the bathroom. No new complaints at this time.  Home Living:     Prior Level of Function:     Precautions:     Vital Signs:  Vital Signs  Heart Rate: 70  Heart Rate Source: Monitor  SpO2: 93 % (pt recieved with O2 not properly positioned. PT repositioned and O2 improved to 94)  Patient Position: Lying    Objective   Pain:  Pain Assessment  Pain Assessment: 0-10  0-10 (Numeric) Pain Score: 0 - No pain  Cognition:       General Assessments:                       Static Sitting Balance  Static Sitting-Level of Assistance: Independent    Static Standing Balance  Static Standing-Level of Assistance: Contact guard  Functional Assessments:  Bed Mobility  Bed Mobility: Yes  Bed Mobility 1  Bed Mobility 1: Supine to sitting  Level of Assistance 1: Modified independent  Bed Mobility 2  Bed Mobility  2: Sitting to supine  Level of Assistance 2: Modified independent    Transfer 1  Technique 1: Sit to stand, Stand to sit  Transfer Level of Assistance 1: Contact guard  Trials/Comments 1: cga with increased cueing to decrease pace of movement and impulsive movements to improve safety with transfers and mobility    Ambulation/Gait Training 1  Assistance 1: Contact guard  Quality of Gait 1: Decreased step length, Diminished heel strike  Comments/Distance (ft) 1: 25' x 2 with O2 (Pt with decreased safety awareness requiring cga for redirection and to slow pace of mobility)    Treatments:  Therapeutic Exercise  Therapeutic Exercise Performed: Yes  Therapeutic Exercise Activity 1: seated marches x 20  Therapeutic Exercise Activity 2: seated laq x 20  Therapeutic Exercise Activity 3: sit to stand x 5    Therapeutic Activity  Therapeutic Activity Performed: Yes  Therapeutic Activity 1: transfers from bed to toilet back to bed    Bed Mobility  Bed Mobility: Yes  Bed Mobility 1  Bed Mobility 1:  Supine to sitting  Level of Assistance 1: Modified independent  Bed Mobility 2  Bed Mobility  2: Sitting to supine  Level of Assistance 2: Modified independent    Ambulation/Gait Training 1  Assistance 1: Contact guard  Quality of Gait 1: Decreased step length, Diminished heel strike  Comments/Distance (ft) 1: 25' x 2 with O2 (Pt with decreased safety awareness requiring cga for redirection and to slow pace of mobility)  Transfer 1  Technique 1: Sit to stand, Stand to sit  Transfer Level of Assistance 1: Contact guard  Trials/Comments 1: cga with increased cueing to decrease pace of movement and impulsive movements to improve safety with transfers and mobility  Outcome Measures:  [facility] Basic Mobility  Turning from your back to your side while in a flat bed without using bedrails: A little  Moving from lying on your back to sitting on the side of a flat bed without using bedrails: A little  Moving to and from bed to chair (including a wheelchair): A little  Standing up from a chair using your arms (e.g. wheelchair or bedside chair): A little  To walk in hospital room: A little  Climbing 3-5 steps with railing: Total  Basic Mobility - Total Score: 16    Encounter Problems       Encounter Problems (Active)       Mobility       transfers  (Progressing)       Start:  07/25/24    Expected End:  08/01/24       Pt will transfer from sit to stand to sit and from bed to chair to bed with LRAD mod I demonstrating increased safety awareness and balance         ambulation (Progressing)       Start:  07/25/24    Expected End:  08/01/24       Pt will ambulate with LRAD 200' x 2 with mod I and improved gait mechanics         stairs (Progressing)       Start:  07/25/24    Expected End:  08/01/24       Pt will ascend and descend 8 stairs with handrail to simulate home set up with mod I         Balance (Progressing)       Start:  07/25/24    Expected End:  08/01/24       Pt will improve dynamic standing balance to G for improve  mobility            Pain - Adult              Education Documentation  Body Mechanics, taught by Biju Garcia PT at 7/27/2024  9:35 AM.  Learner: Patient  Readiness: Acceptance  Method: Explanation, Demonstration  Response: Needs Reinforcement, Verbalizes Understanding  Comment: educated on the importance of taking his time during transfers from sit to stand and progressing with ambulation.    Education Comments  No comments found.

## 2024-07-27 NOTE — PROGRESS NOTES
"Occupational Therapy    Occupational Therapy Treatment    Name: Gigi Carver \"Rui\"  MRN: [id number]  : 1945  Date: 24  Time Calculation  Start Time: 931  Stop Time:   Time Calculation (min): 40 min    Assessment:  OT Assessment: Pt. participated in partial self care performance at this time after extended time providing the pt.  with additional education on OT services & with pt.'s expressing conditional participation(ie. Pt. said, \"If you do this, I will do that.\") Pt. demonstrates stand pivot transfers with CGA & cues for safety, however is reluctant to have hands on assistance with standing & transfers likely due to his impaired insight regarding his deficits.  Pt. demonstrated partial LE dressing at this time with Tamar & cues needed to don his L slipper sock & CGA & cues for the stand phase of dressing.  Pt.'s O2 saturation level decreased to 88%-89% at times with activity with cues provided for therapeutic rest & use of pursed lip breathing.  Continue OT per POC to address deficit areas to progress the pt. towards his previous level of functioning.  Prognosis: Good with compliance.  Barriers to Discharge: Decreased caregiver support  End of Session Communication: Bedside nurse  End of Session Patient Position: Bed, 2 rail up, Alarm on  Plan:  Treatment Interventions: ADL retraining, Endurance training, Cognitive reorientation, Patient/family training, Equipment evaluation/education, Neuromuscular reeducation, Compensatory technique education  OT Frequency: 3 times per week  OT Discharge Recommendations: Moderate intensity level of continued care  Equipment Recommended upon Discharge: Wheeled walker  OT Recommended Transfer Status: CGA  OT - OK to Discharge: Yes Based on completed evaluation and care plan recommendations, no barriers to discharge to next site of care      Subjective   Previous Visit Info:  OT Last Visit  OT Received On: 24  General:  General  Reason for Referral: " "Impaired self care skills & functional transfers due to hospitalization for COPD exacerbation  Past Medical History Relevant to Rehab: masenteric ischemia, UTI, AMS, GERD  Prior to Session Communication: Bedside nurse  Patient Position Received: Alarm off, not on at start of session  Preferred Learning Style: visual, verbal  General Comment: O2  Precautions:safety precautions, fall risk, O2     Vitals:  Vital Signs  Heart Rate: 74  Heart Rate Source: Monitor  SpO2: 93 %  BP:  Pt.'s O2 saturtaion level decreased to 88%-89% at times with activity.  Patient Position: Lying  Pain Assessment:  Pain Assessment  Pain Assessment: 0-10  0-10 (Numeric) Pain Score: 0 - No pain     Objective   Cognition:  Overall Cognitive Status: Pt. required encouragement for participation.  Extended time spent re educating the pt. on the purpose of OT services.  Pt.'s participation was conditional at this time.  Insight: Impaired insight.  Pt. seemed to make excuses for or blame this therapist for his intermittent unsteadiness saying that his loss of balance occurred due to where this therapist was standing. (ie. Pt. said, \"You were not where you were supposed to be.)  Activities of Daily Living: Grooming  Grooming Level of Assistance:  Partial grooming with S set up.    LE Bathing  LE Bathing Level of Assistance: Partial LE bathing with CGA & cues when standing to perform perineal hygiene care.  Pt. was reluctant to have hands on assistance despite his unsteadiness observed at this time.  LE Bathing Where Assessed: Edge of bed  LE Bathing Comments: Verbal cues for one UE for support for increased safety with standing balance.    LE Dressing  LE Dressing: Yes  Sock Level of Assistance:S & cues to don his R slipper sock. Vc's for use of the foot to knee positioning technique.  Pt. requested to have assistance to don his L slipper sock at this time.  Adult Briefs Level of Assistance: Contact guard, Setup  LE Dressing Where Assessed: Edge of " bed  LE Dressing Comments: CGA & cues for the stand phase of dressing.     Bed Mobility/Transfers: Bed Mobility  Bed Mobility: Yes  Bed Mobility 1  Bed Mobility 1: Supine to sitting  Level of Assistance 1: Modified independent  Bed Mobility 2  Bed Mobility  2: Sitting to supine  Level of Assistance 2: Modified independent    Transfer 1  Technique 1: Stand pivot, Stand to sit, Sit to stand. Transfers from bed to bedside chair:CGA & cues  Transfer Level of Assistance 1: Contact guard  Trials/Comments 1: Verbal cues for safety.  Pt. expressed reluctance to have hands on assistance with transfers & standing at this time.    Standing Balance:  Static Standing Balance  Static Standing-Level of Assistance: CGA & cues for standing balance during self care     Other Activity:  Other Activity  Other Activity Performed: Education provided on use of pursed lip breathing & energy conservation.    Outcome Measures:  [facility] Daily Activity  Putting on and taking off regular lower body clothing: A little  Bathing (including washing, rinsing, drying): A little  Putting on and taking off regular upper body clothing: A little  Toileting, which includes using toilet, bedpan or urinal: A little  Taking care of personal grooming such as brushing teeth: A little  Eating Meals: None  Daily Activity - Total Score: 19    Education Documentation  Precautions, taught by Daily Hanson OT at 7/27/2024 12:46 PM.  Learner: Patient  Readiness: Acceptance  Method: Demonstration, Explanation  Response: Needs Reinforcement, Demonstrated Understanding  Comment: Education on safety with transfers, safety with balance, energy conservation, pursed lip breathing & compensation techniques for self care.    ADL Training, taught by Daily Hanson OT at 7/27/2024 12:46 PM.  Learner: Patient  Readiness: Acceptance  Method: Demonstration, Explanation  Response: Needs Reinforcement, Demonstrated Understanding  Comment: Education on safety with transfers,  safety with balance, energy conservation, pursed lip breathing & compensation techniques for self care.      Goals:  Encounter Problems       Encounter Problems (Active)       ADLs       Patient will perform UB and LB bathing with modified independent level of assistance. (Progressing)       Start:  07/25/24    Expected End:  08/08/24            Patient will complete daily grooming tasks brushing teeth and washing face/hair with modified independent level of assistance and PRN adaptive equipment while standing.       Start:  07/25/24    Expected End:  08/08/24            Patient will complete toileting including hygiene clothing management/hygiene with modified independent level of assistance.       Start:  07/25/24    Expected End:  08/08/24               COGNITION/SAFETY       Pt. will demo good safety awareness 75% of the time throughout therapy sessions to reduce risks of falls and injury.        Start:  07/25/24    Expected End:  08/08/24               MOBILITY       Patient will perform Functional mobility max Household distances/Community Distances with modified independent level of assistance and front wheeled walker in order to improve safety and functional mobility.       Start:  07/25/24    Expected End:  08/08/24               TRANSFERS       Patient will complete sit to stand transfer with modified independent level of assistance and least restrictive device in order to improve safety and prepare for out of bed mobility.       Start:  07/25/24    Expected End:  08/08/24

## 2024-07-28 VITALS
RESPIRATION RATE: 21 BRPM | HEART RATE: 116 BPM | WEIGHT: 153.66 LBS | DIASTOLIC BLOOD PRESSURE: 105 MMHG | HEIGHT: 67 IN | BODY MASS INDEX: 24.12 KG/M2 | SYSTOLIC BLOOD PRESSURE: 161 MMHG | OXYGEN SATURATION: 94 % | TEMPERATURE: 99.1 F

## 2024-07-28 LAB
ANION GAP SERPL CALC-SCNC: 10 MMOL/L (ref 10–20)
BUN SERPL-MCNC: 25 MG/DL (ref 6–23)
CALCIUM SERPL-MCNC: 8.7 MG/DL (ref 8.6–10.3)
CHLORIDE SERPL-SCNC: 99 MMOL/L (ref 98–107)
CO2 SERPL-SCNC: 36 MMOL/L (ref 21–32)
CREAT SERPL-MCNC: 0.69 MG/DL (ref 0.5–1.3)
EGFRCR SERPLBLD CKD-EPI 2021: >90 ML/MIN/1.73M*2
ERYTHROCYTE [DISTWIDTH] IN BLOOD BY AUTOMATED COUNT: 13.7 % (ref 11.5–14.5)
GLUCOSE SERPL-MCNC: 151 MG/DL (ref 74–99)
HCT VFR BLD AUTO: 38.8 % (ref 41–52)
HGB BLD-MCNC: 12.9 G/DL (ref 13.5–17.5)
HOLD SPECIMEN: NORMAL
MCH RBC QN AUTO: 33.9 PG (ref 26–34)
MCHC RBC AUTO-ENTMCNC: 33.2 G/DL (ref 32–36)
MCV RBC AUTO: 102 FL (ref 80–100)
NRBC BLD-RTO: 0 /100 WBCS (ref 0–0)
PLATELET # BLD AUTO: 96 X10*3/UL (ref 150–450)
POTASSIUM SERPL-SCNC: 4 MMOL/L (ref 3.5–5.3)
RBC # BLD AUTO: 3.81 X10*6/UL (ref 4.5–5.9)
SODIUM SERPL-SCNC: 141 MMOL/L (ref 136–145)
WBC # BLD AUTO: 3.5 X10*3/UL (ref 4.4–11.3)

## 2024-07-28 PROCEDURE — 2500000002 HC RX 250 W HCPCS SELF ADMINISTERED DRUGS (ALT 637 FOR MEDICARE OP, ALT 636 FOR OP/ED): Mod: IPSPLIT

## 2024-07-28 PROCEDURE — 2500000002 HC RX 250 W HCPCS SELF ADMINISTERED DRUGS (ALT 637 FOR MEDICARE OP, ALT 636 FOR OP/ED): Mod: IPSPLIT | Performed by: NURSE PRACTITIONER

## 2024-07-28 PROCEDURE — 2500000001 HC RX 250 WO HCPCS SELF ADMINISTERED DRUGS (ALT 637 FOR MEDICARE OP): Mod: IPSPLIT | Performed by: SURGERY

## 2024-07-28 PROCEDURE — S4991 NICOTINE PATCH NONLEGEND: HCPCS | Mod: IPSPLIT

## 2024-07-28 PROCEDURE — 80048 BASIC METABOLIC PNL TOTAL CA: CPT | Mod: IPSPLIT | Performed by: NURSE PRACTITIONER

## 2024-07-28 PROCEDURE — 2500000004 HC RX 250 GENERAL PHARMACY W/ HCPCS (ALT 636 FOR OP/ED): Mod: IPSPLIT | Performed by: NURSE PRACTITIONER

## 2024-07-28 PROCEDURE — 85027 COMPLETE CBC AUTOMATED: CPT | Mod: IPSPLIT | Performed by: NURSE PRACTITIONER

## 2024-07-28 PROCEDURE — 2500000002 HC RX 250 W HCPCS SELF ADMINISTERED DRUGS (ALT 637 FOR MEDICARE OP, ALT 636 FOR OP/ED): Mod: IPSPLIT | Performed by: INTERNAL MEDICINE

## 2024-07-28 PROCEDURE — 99232 SBSQ HOSP IP/OBS MODERATE 35: CPT | Performed by: SURGERY

## 2024-07-28 PROCEDURE — 2500000005 HC RX 250 GENERAL PHARMACY W/O HCPCS: Mod: IPSPLIT | Performed by: NURSE PRACTITIONER

## 2024-07-28 PROCEDURE — 1100000001 HC PRIVATE ROOM DAILY: Mod: IPSPLIT

## 2024-07-28 PROCEDURE — 2500000001 HC RX 250 WO HCPCS SELF ADMINISTERED DRUGS (ALT 637 FOR MEDICARE OP): Mod: IPSPLIT | Performed by: NURSE PRACTITIONER

## 2024-07-28 PROCEDURE — 2500000001 HC RX 250 WO HCPCS SELF ADMINISTERED DRUGS (ALT 637 FOR MEDICARE OP): Mod: IPSPLIT

## 2024-07-28 PROCEDURE — 94640 AIRWAY INHALATION TREATMENT: CPT | Mod: IPSPLIT

## 2024-07-28 PROCEDURE — 94760 N-INVAS EAR/PLS OXIMETRY 1: CPT | Mod: IPSPLIT

## 2024-07-28 PROCEDURE — 36415 COLL VENOUS BLD VENIPUNCTURE: CPT | Mod: IPSPLIT | Performed by: NURSE PRACTITIONER

## 2024-07-28 PROCEDURE — 99233 SBSQ HOSP IP/OBS HIGH 50: CPT | Performed by: NURSE PRACTITIONER

## 2024-07-28 RX ORDER — LOPERAMIDE HYDROCHLORIDE 2 MG/1
4 CAPSULE ORAL ONCE
Status: COMPLETED | OUTPATIENT
Start: 2024-07-28 | End: 2024-07-28

## 2024-07-28 RX ORDER — LOPERAMIDE HYDROCHLORIDE 2 MG/1
2 CAPSULE ORAL DAILY
Status: DISCONTINUED | OUTPATIENT
Start: 2024-07-28 | End: 2024-07-30 | Stop reason: HOSPADM

## 2024-07-28 RX ORDER — PREDNISONE 20 MG/1
20 TABLET ORAL DAILY
Status: DISCONTINUED | OUTPATIENT
Start: 2024-08-04 | End: 2024-07-30 | Stop reason: HOSPADM

## 2024-07-28 RX ORDER — LOSARTAN POTASSIUM 50 MG/1
50 TABLET ORAL DAILY
Status: DISCONTINUED | OUTPATIENT
Start: 2024-07-28 | End: 2024-07-30 | Stop reason: HOSPADM

## 2024-07-28 RX ORDER — PREDNISONE 5 MG/1
10 TABLET ORAL DAILY
Status: DISCONTINUED | OUTPATIENT
Start: 2024-08-07 | End: 2024-07-30 | Stop reason: HOSPADM

## 2024-07-28 RX ORDER — PREDNISONE 20 MG/1
40 TABLET ORAL DAILY
Status: DISCONTINUED | OUTPATIENT
Start: 2024-07-29 | End: 2024-07-30 | Stop reason: HOSPADM

## 2024-07-28 ASSESSMENT — COGNITIVE AND FUNCTIONAL STATUS - GENERAL
MOBILITY SCORE: 21
DRESSING REGULAR LOWER BODY CLOTHING: A LITTLE
HELP NEEDED FOR BATHING: A LITTLE
TOILETING: A LOT
DRESSING REGULAR LOWER BODY CLOTHING: A LITTLE
DAILY ACTIVITIY SCORE: 17
PERSONAL GROOMING: A LOT
CLIMB 3 TO 5 STEPS WITH RAILING: A LOT
PERSONAL GROOMING: A LOT
DAILY ACTIVITIY SCORE: 15
CLIMB 3 TO 5 STEPS WITH RAILING: A LOT
DRESSING REGULAR UPPER BODY CLOTHING: A LITTLE
WALKING IN HOSPITAL ROOM: A LITTLE
WALKING IN HOSPITAL ROOM: A LITTLE
HELP NEEDED FOR BATHING: A LOT
MOBILITY SCORE: 20
DRESSING REGULAR UPPER BODY CLOTHING: A LOT
TOILETING: A LOT
STANDING UP FROM CHAIR USING ARMS: A LITTLE

## 2024-07-28 ASSESSMENT — PAIN SCALES - GENERAL
PAINLEVEL_OUTOF10: 0 - NO PAIN
PAINLEVEL_OUTOF10: 0 - NO PAIN

## 2024-07-28 ASSESSMENT — PAIN - FUNCTIONAL ASSESSMENT: PAIN_FUNCTIONAL_ASSESSMENT: 0-10

## 2024-07-28 NOTE — PROGRESS NOTES
"Rui Carver is a 79 y.o. male on day 4 of admission presenting with COPD exacerbation (Multi).    Subjective   Having diarrhea.  Abdominal pain improved.  Diarrhea is watery.  Stool cultures were negative.  C. difficile was negative.       Objective     Physical Exam  Constitutional:       Appearance: Normal appearance.   Abdominal:      Palpations: Abdomen is soft. There is no mass.      Tenderness: There is no abdominal tenderness. There is no guarding.         Last Recorded Vitals  Blood pressure (!) 168/97, pulse 101, temperature 36.5 °C (97.7 °F), temperature source Temporal, resp. rate 20, height 1.702 m (5' 7\"), weight 69.7 kg (153 lb 10.6 oz), SpO2 93%.  Intake/Output last 3 Shifts:  I/O last 3 completed shifts:  In: 640 (9.2 mL/kg) [P.O.:640]  Out: - (0 mL/kg)   Weight: 69.7 kg             Assessment/Plan   Principal Problem:    COPD exacerbation (Multi)  Active Problems:    Cirrhosis of liver without ascites (Multi)    Acute hypoxic respiratory failure (Multi)    Tobacco use disorder    Generalized abdominal pain    Superior mesenteric artery stenosis (Multi)    Acute cystitis without hematuria    Benign prostatic hyperplasia without lower urinary tract symptoms    Pancytopenia (Multi)    Recurrent major depressive disorder (CMS-HCC)    Dementia (Multi)    Anxiety    COPD with acute exacerbation (Multi)    Duodenitis    Plan- start Imodium daily.  Continue proton pump inhibitor and Carafate can decrease Carafate to twice daily on discharge      Jose E Myles MD      "

## 2024-07-28 NOTE — NURSING NOTE
0705: Assumed care of patient    0833: meds administered per MAR, patient accepted whole followed by thin liquids and tolerated well. Patient continues with elevated BP, provider notified of same.     1100: Family at bedside     1123: Patient started on losartan for elevated BP    1300: Patient tearful, 1:1 provided     1823: The patient's goals for the shift include sleep    The clinical goals for the shift include patient will maintain an SpO2 of 93% or higher this shift    Over the shift, the patient did  make progress toward the following goals.

## 2024-07-28 NOTE — CARE PLAN
The patient's goals for the shift include sleep    The clinical goals for the shift include PT ox sat will remain greater than 92% this shift    Over the shift, the patient did make progress toward the following goals.

## 2024-07-28 NOTE — PROGRESS NOTES
Rui Carver is a 79 y.o. male on day 4 of admission presenting with COPD exacerbation (Multi).    Subjective   He is sitting up in bed, getting ready to eat breakfast. He denies pain, shortness of breath. Oxygen on. He is tearful about his wife again. We discussed discharge tomorrow to swing bed at [place]. All questions answered.     Objective   Last Recorded Vitals  BP (!) 168/97 (BP Location: Left arm, Patient Position: Lying)   Pulse 101   Temp 36.5 °C (97.7 °F) (Temporal)   Resp 20   Wt 69.7 kg (153 lb 10.6 oz)   SpO2 93%   Intake/Output last 3 Shifts:    Intake/Output Summary (Last 24 hours) at 7/28/2024 0714  Last data filed at 7/27/2024 1015  Gross per 24 hour   Intake 400 ml   Output --   Net 400 ml     Admission Weight  Weight: 69.7 kg (153 lb 10.6 oz) (07/23/24 1153)    Daily Weight  07/23/24 : 69.7 kg (153 lb 10.6 oz)    Physical Exam  Vitals reviewed.   HENT:      Head: Normocephalic and atraumatic.      Right Ear: External ear normal.      Left Ear: External ear normal.      Nose: Nose normal.      Mouth/Throat:      Pharynx: Oropharynx is clear.   Eyes:      Conjunctiva/sclera: Conjunctivae normal.   Cardiovascular:      Rate and Rhythm: Normal rate and regular rhythm.      Pulses: Normal pulses.      Heart sounds: Normal heart sounds.   Pulmonary:      Breath sounds: Decreased breath sounds present.   Abdominal:      General: Bowel sounds are normal.      Palpations: Abdomen is soft.   Musculoskeletal:         General: Normal range of motion.      Cervical back: Normal range of motion and neck supple.   Skin:     General: Skin is dry.   Neurological:      General: No focal deficit present.      Mental Status: He is alert.      Comments: forgetful   Psychiatric:         Behavior: Behavior normal.       Scheduled medications  aspirin, 81 mg, oral, Daily  [Held by provider] enoxaparin, 40 mg, subcutaneous, q24h  hydrOXYzine HCL, 25 mg, oral, BID  loperamide, 2 mg, oral, Daily  methylPREDNISolone  sodium succinate (PF), 40 mg, intravenous, q8h WILL  nicotine, 1 patch, transdermal, Daily  OLANZapine, 5 mg, oral, Nightly  pantoprazole, 40 mg, oral, Daily before breakfast  sucralfate, 1 g, oral, q6h WILL  tamsulosin, 0.4 mg, oral, Daily    Continuous medications  sodium chloride 0.9%, 10 mL/hr, Last Rate: Stopped (07/25/24 1900)    PRN medications  PRN medications: acetaminophen, acetaminophen, albuterol, dicyclomine, ondansetron, oxygen, polyethylene glycol, prochlorperazine **OR** prochlorperazine, sodium chloride 0.9%    Relevant Results:  Esophagogastroduodenoscopy (EGD)  Result Date: 7/25/2024    ECG 12 lead  Result Date: 7/24/2024  Sinus rhythm with Premature atrial complexes with Aberrant conduction Right bundle branch block T wave abnormality, consider lateral ischemia Abnormal ECG When compared with ECG of 23-JUL-2024 11:59, Aberrant conduction is now Present Questionable change in QRS axis Nonspecific T wave abnormality has replaced inverted T waves in Inferior leads See ED provider note for full interpretation and clinical correlation Confirmed by Tiffany Giron ([id number]) on 7/24/2024 5:52:58 PM    CT angio abdomen pelvis w and or wo IV IV contrast  Result Date: 7/23/2024  1. Mild pericardial effusion with a thickness measuring 1.0 cm. 2. Moderate to severe centrilobular emphysema. 3. There is thickening of the distal esophagus measuring up to 0.8 cm. There is thickening of the gastric wall. Correlation regarding gastritis is suggested. 4. Nodular contour of the liver may be associated with cirrhosis. No hepatic mass or perihepatic ascites. 5. Prominent portal vessels measuring up to 1.3 cm. 6. Scattered colonic diverticulosis without acute diverticulitis. 7. Abnormal bladder wall thickening measuring up to 0.7 cm. Correlation regarding cystitis is suggested. 8. Prominent prostate measuring 4.5 x 4.8 cm. 9. Small umbilical hernia containing fat only. 10. 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 Signed by Audie Mark MD    CT chest wo IV contrast  Result Date: 7/23/2024  Severe emphysema with multiple pulmonary nodules are unchanged. No focal regions of airspace consolidation. Small amount of mucus/debris within the distal trachea is new compared to exam 7/15/2024 Signed by Bridger Madden MD     Latest Reference Range & Units 07/26/24 07:29 07/27/24 06:42 07/28/24 07:12   GLUCOSE 74 - 99 mg/dL 223 (H) 121 (H) 151 (H)   SODIUM 136 - 145 mmol/L 140 144 141   POTASSIUM 3.5 - 5.3 mmol/L 4.1 4.0 4.0   CHLORIDE 98 - 107 mmol/L 102 100 99   Bicarbonate 21 - 32 mmol/L 31 39 (H) 36 (H)   Anion Gap 10 - 20 mmol/L 11 9 (L) 10   Blood Urea Nitrogen 6 - 23 mg/dL 27 (H) 26 (H) 25 (H)   Creatinine 0.50 - 1.30 mg/dL 0.65 0.67 0.69   EGFR >60 mL/min/1.73m*2 >90 >90 >90   Calcium 8.6 - 10.3 mg/dL 8.1 (L) 8.8 8.7   LEUKOCYTES (10*3/UL) IN BLOOD BY AUTOMATED COUNT, [place] 4.4 - 11.3 x10*3/uL 3.3 (L) 3.5 (L) 3.5 (L)   nRBC 0.0 - 0.0 /100 WBCs 0.0 0.0 0.0   ERYTHROCYTES (10*6/UL) IN BLOOD BY AUTOMATED COUNT, [place] 4.50 - 5.90 x10*6/uL 3.51 (L) 4.04 (L) 3.81 (L)   HEMOGLOBIN 13.5 - 17.5 g/dL 12.0 (L) 13.8 12.9 (L)   HEMATOCRIT 41.0 - 52.0 % 36.8 (L) 42.0 38.8 (L)   MCV 80 - 100 fL 105 (H) 104 (H) 102 (H)   MCH 26.0 - 34.0 pg 34.2 (H) 34.2 (H) 33.9   MCHC 32.0 - 36.0 g/dL 32.6 32.9 33.2   RED CELL DISTRIBUTION WIDTH 11.5 - 14.5 % 14.3 13.8 13.7   PLATELETS (10*3/UL) IN BLOOD AUTOMATED COUNT, [place] 150 - 450 x10*3/uL 92 (L) 105 (L) 96 (L)     Assessment/Plan    Principal Problem:    COPD exacerbation (Multi)  Active Problems:    Cirrhosis of liver without ascites (Multi)    Acute hypoxic respiratory failure (Multi)    Tobacco use disorder    Generalized abdominal pain    Superior mesenteric artery stenosis (Multi)    Acute cystitis without hematuria    Benign prostatic hyperplasia without lower urinary tract symptoms    Pancytopenia (Multi)    Recurrent major depressive disorder  (CMS-HCC)    Dementia (Multi)    Anxiety    COPD with acute exacerbation (Multi)    Duodenitis    #COPD exacerbation  #Acute hypoxic respiratory failure   #Tobacco use disorder   -CT chest: Severe emphysema with multiple pulmonary nodules are unchanged. No focal regions of airspace consolidation. Small amount of mucus/debris within the distal trachea is new compared to exam 7/15/2024  -WBC 3.5  -Lactate 1.2  -Supplemental oxygen to maintain an sp02 greater than 92%  -Currently on 2L, baseline RA  -Nicotine patch   -Solumedrol q12 then taper  -Bronchodilators   -Completed IV azithro   -RT to eval/treat  -Encourage cessation     #Duodenitis   #Liver cirrhosis   #Superior mesenteric stenosis   -Recently seen by GI and Vascular at [facility]  -Rec follow up with Vasc for SMA stenosis   -CT angiogram: 1. Mild pericardial effusion with a thickness measuring 1.0 cm. 2. Moderate to severe centrilobular emphysema. 3. There is thickening of the distal esophagus measuring up to 0.8 cm. There is thickening of the gastric wall. Correlation regarding gastritis is suggested. 4. Nodular contour of the liver may be associated with cirrhosis. No hepatic mass or perihepatic ascites. 5. Prominent portal vessels measuring up to 1.3 cm. 6. Scattered colonic diverticulosis without acute diverticulitis. 7. Abnormal bladder wall thickening measuring up to 0.7 cm. Correlation regarding cystitis is suggested. 8. Prominent prostate measuring 4.5 x 4.8 cm. 9. Small umbilical hernia containing fat only. 10. 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  -GI consult, appreciate recs   -Acute care surgery consult, appreciate recs   -EGD showed duodenitis within duodenal bulb; will continue PPI and carafate indefinitely per Dr. Myles   -Continue PPI, sucralfate   -Continue ASA     #UTI, resolved   #BPH  -Urine cx from 7/12 grew enterococcus faecalis resistant to cipro  -Was dc on cipro  -Start PO augmentin;  discontinue   - leuks, 1-5 WBC   -Urine cx negative   -Continue tamsulosin      #Pancytopenia, improving   -WBC 2.2  > 5.2 > 3.3 > 3.5  -RBC 3.80 > 3.94 > 3.51 > 4.04  -H/H 13.3/40.1 > 13.6/41.0 > 12.0/36.8 > 13.8/42.0  -Plt 85 > 110 > 96  -Hold AC, lovenox   -Daily CBC  -Monitor   -Outpatient follow up with hepatology      #Hx of concussion with brain injury   #Concern for dementia  #Anxiety   #Depression  -Safety precautions  -PT/OT evals  -Psych consult, appreciate recs   -Continue hydroxyzine, olanzapine     DVT ppx: ASA   PUD ppx: pantoprazole   F: PRN  E: Replete per protocol  N: Regular   A: PIV   Disposition: Pt requires less than 2 inpatient days at this time, will DC to [facility] Monday  Code Status: DNR/DNI     Mei Ornelas, APRN-CNP

## 2024-07-28 NOTE — NURSING NOTE
Assumed care for pt at this time, pt noted resting in bed with head of bed elevated and call light in reach. Pt showing no s.s of pain,distress,or discomfort. PT safety measures in place, will continue to monitor.     @0654 pt noted with no changes in condition pt currently in bed with head of bed elevated and call light in reach. . Pt became anxious and restless throughout the night, prn Seroquel effective. Pt safety measures in place will endorse to oncoming nurse to continue to monitor.

## 2024-07-29 LAB
ANION GAP SERPL CALC-SCNC: <7 MMOL/L (ref 10–20)
BUN SERPL-MCNC: 27 MG/DL (ref 6–23)
CALCIUM SERPL-MCNC: 8.2 MG/DL (ref 8.6–10.3)
CHLORIDE SERPL-SCNC: 102 MMOL/L (ref 98–107)
CO2 SERPL-SCNC: 39 MMOL/L (ref 21–32)
CREAT SERPL-MCNC: 0.61 MG/DL (ref 0.5–1.3)
EGFRCR SERPLBLD CKD-EPI 2021: >90 ML/MIN/1.73M*2
ERYTHROCYTE [DISTWIDTH] IN BLOOD BY AUTOMATED COUNT: 14.1 % (ref 11.5–14.5)
GLUCOSE SERPL-MCNC: 101 MG/DL (ref 74–99)
HCT VFR BLD AUTO: 37.4 % (ref 41–52)
HGB BLD-MCNC: 12.1 G/DL (ref 13.5–17.5)
MCH RBC QN AUTO: 34.2 PG (ref 26–34)
MCHC RBC AUTO-ENTMCNC: 32.4 G/DL (ref 32–36)
MCV RBC AUTO: 106 FL (ref 80–100)
NRBC BLD-RTO: 0 /100 WBCS (ref 0–0)
PLATELET # BLD AUTO: 89 X10*3/UL (ref 150–450)
POTASSIUM SERPL-SCNC: 4.2 MMOL/L (ref 3.5–5.3)
RBC # BLD AUTO: 3.54 X10*6/UL (ref 4.5–5.9)
SODIUM SERPL-SCNC: 143 MMOL/L (ref 136–145)
WBC # BLD AUTO: 3.8 X10*3/UL (ref 4.4–11.3)

## 2024-07-29 PROCEDURE — 1100000001 HC PRIVATE ROOM DAILY: Mod: IPSPLIT

## 2024-07-29 PROCEDURE — 2500000001 HC RX 250 WO HCPCS SELF ADMINISTERED DRUGS (ALT 637 FOR MEDICARE OP): Mod: IPSPLIT | Performed by: NURSE PRACTITIONER

## 2024-07-29 PROCEDURE — 85027 COMPLETE CBC AUTOMATED: CPT | Mod: IPSPLIT | Performed by: NURSE PRACTITIONER

## 2024-07-29 PROCEDURE — 97110 THERAPEUTIC EXERCISES: CPT | Mod: GP,CQ,IPSPLIT

## 2024-07-29 PROCEDURE — 80048 BASIC METABOLIC PNL TOTAL CA: CPT | Mod: IPSPLIT | Performed by: NURSE PRACTITIONER

## 2024-07-29 PROCEDURE — 2500000002 HC RX 250 W HCPCS SELF ADMINISTERED DRUGS (ALT 637 FOR MEDICARE OP, ALT 636 FOR OP/ED): Mod: IPSPLIT

## 2024-07-29 PROCEDURE — 2500000005 HC RX 250 GENERAL PHARMACY W/O HCPCS: Mod: IPSPLIT | Performed by: NURSE PRACTITIONER

## 2024-07-29 PROCEDURE — 82374 ASSAY BLOOD CARBON DIOXIDE: CPT | Mod: IPSPLIT | Performed by: NURSE PRACTITIONER

## 2024-07-29 PROCEDURE — 2500000001 HC RX 250 WO HCPCS SELF ADMINISTERED DRUGS (ALT 637 FOR MEDICARE OP): Mod: IPSPLIT | Performed by: SURGERY

## 2024-07-29 PROCEDURE — 99233 SBSQ HOSP IP/OBS HIGH 50: CPT | Performed by: NURSE PRACTITIONER

## 2024-07-29 PROCEDURE — 2500000004 HC RX 250 GENERAL PHARMACY W/ HCPCS (ALT 636 FOR OP/ED): Mod: IPSPLIT | Performed by: NURSE PRACTITIONER

## 2024-07-29 PROCEDURE — S4991 NICOTINE PATCH NONLEGEND: HCPCS | Mod: IPSPLIT

## 2024-07-29 PROCEDURE — 2500000002 HC RX 250 W HCPCS SELF ADMINISTERED DRUGS (ALT 637 FOR MEDICARE OP, ALT 636 FOR OP/ED): Mod: IPSPLIT | Performed by: NURSE PRACTITIONER

## 2024-07-29 PROCEDURE — 94760 N-INVAS EAR/PLS OXIMETRY 1: CPT | Mod: IPSPLIT

## 2024-07-29 PROCEDURE — 36415 COLL VENOUS BLD VENIPUNCTURE: CPT | Mod: IPSPLIT | Performed by: NURSE PRACTITIONER

## 2024-07-29 PROCEDURE — 92610 EVALUATE SWALLOWING FUNCTION: CPT | Mod: GN,IPSPLIT | Performed by: SPEECH-LANGUAGE PATHOLOGIST

## 2024-07-29 RX ORDER — HYDROXYZINE HYDROCHLORIDE 25 MG/1
25 TABLET, FILM COATED ORAL 2 TIMES DAILY PRN
Status: DISCONTINUED | OUTPATIENT
Start: 2024-07-29 | End: 2024-07-30 | Stop reason: HOSPADM

## 2024-07-29 RX ORDER — HYDROXYZINE PAMOATE 25 MG/1
50 CAPSULE ORAL ONCE
Status: COMPLETED | OUTPATIENT
Start: 2024-07-29 | End: 2024-07-29

## 2024-07-29 ASSESSMENT — COGNITIVE AND FUNCTIONAL STATUS - GENERAL
CLIMB 3 TO 5 STEPS WITH RAILING: A LITTLE
MOVING TO AND FROM BED TO CHAIR: A LITTLE
CLIMB 3 TO 5 STEPS WITH RAILING: A LOT
MOBILITY SCORE: 21
DRESSING REGULAR LOWER BODY CLOTHING: A LITTLE
WALKING IN HOSPITAL ROOM: A LITTLE
TOILETING: A LOT
MOVING FROM LYING ON BACK TO SITTING ON SIDE OF FLAT BED WITH BEDRAILS: A LITTLE
DAILY ACTIVITIY SCORE: 17
PERSONAL GROOMING: A LOT
DRESSING REGULAR LOWER BODY CLOTHING: A LITTLE
DAILY ACTIVITIY SCORE: 17
TOILETING: A LOT
WALKING IN HOSPITAL ROOM: A LITTLE
MOBILITY SCORE: 16
DRESSING REGULAR UPPER BODY CLOTHING: A LITTLE
STANDING UP FROM CHAIR USING ARMS: A LITTLE
DRESSING REGULAR UPPER BODY CLOTHING: A LITTLE
TURNING FROM BACK TO SIDE WHILE IN FLAT BAD: A LITTLE
HELP NEEDED FOR BATHING: A LITTLE
CLIMB 3 TO 5 STEPS WITH RAILING: A LITTLE
PERSONAL GROOMING: A LOT
STANDING UP FROM CHAIR USING ARMS: A LITTLE
WALKING IN HOSPITAL ROOM: A LOT
HELP NEEDED FOR BATHING: A LITTLE
MOBILITY SCORE: 22

## 2024-07-29 ASSESSMENT — PAIN - FUNCTIONAL ASSESSMENT
PAIN_FUNCTIONAL_ASSESSMENT: 0-10
PAIN_FUNCTIONAL_ASSESSMENT: 0-10

## 2024-07-29 ASSESSMENT — PAIN SCALES - GENERAL
PAINLEVEL_OUTOF10: 0 - NO PAIN

## 2024-07-29 NOTE — PROGRESS NOTES
"Speech-Language Pathology    SLP Adult Inpatient Speech-Language Pathology Clinical Swallow Evaluation    Patient Name: Gigi Carver \"Rui\"  MRN: [id number]  Today's Date: 7/29/2024   Time Calculation  Start Time: 1215  Stop Time: 1235  Time Calculation (min): 20 min     Current Problem:   1. Epigastric pain  Esophagogastroduodenoscopy (EGD)    Esophagogastroduodenoscopy (EGD)    Surgical Pathology Exam    Surgical Pathology Exam      2. COPD with acute exacerbation (Multi)  Surgical Pathology Exam    Surgical Pathology Exam      3. Hypoxia  Surgical Pathology Exam    Surgical Pathology Exam      4. Cirrhosis of liver without ascites, unspecified hepatic cirrhosis type (Multi)  Referral to Gastroenterology    Surgical Pathology Exam    Surgical Pathology Exam      5. Abnormal CT of the abdomen  Referral to Gastroenterology    Surgical Pathology Exam    Surgical Pathology Exam        Assessed: Dysphagia    Recommendations:      Solid Diet Recommendations : Regular - Easy Chew (IDDSI Level 7)   Liquid Diet Recommendations: thin (IDDSI Level 0)   Compensatory Swallowing Strategies: Upright 90 degrees when eating/drinking, slow intake rate, allow time for multi swallows, swallow completely before taking the next bite/sip, be sure dentures are in place before intake, set up assist    Plan:  Skilled speech therapy for dysphagia treatment is warranted in order to provide training and instruction regarding the use of compensatory swallow strategies and for further assessment of diet tolerance to minimize the risk of pulmonary compromise and maximize adequate intake amounts.  SLP Frequency: 3 x per week  Next Treatment Priority: Assessing diet tolerance, education and utilization of safe swallowing strategies.   Discussed POC: Patient   Discussed Risks/Benefits: Patient    Patient Agreeable: Pt was agitated and did not demonstrate complete understanding or agreement. Will con't to address at next session.    Goals:   Long " "term goal:  Pt will tolerate the safest, least restrictive diet level indicated by adequate intake and no overt s/s of aspiration/penetration.      Short term goals:  1. Patient will tolerate recommended diet indicated by adequate intake and no overt s/s of oral/pharyngeal dysphagia.  2. Patient will demonstrate utilization of safe swallowing compensatory strategies with min cues/prompts.    3. Patient will tolerate upgraded diet consistency trials and further thin liquid trials with no overt s/s of oral or pharyngeal dysphagia.      IE - 7/29/2024, Goal target date: 2 weeks from IE - 8/12/2024    Assessment:  Patient Positioning: upright in bed with positioning assist  Baseline Vocal Quality: Normal  Volitional Swallow: Unable to assess due to pt cooperation level  Consistencies Trialed: Thin (IDDSI Level 0) - straw sips, Regular (IDDSI Level 7)     The pt initially reported that he was not hungry. SLP communicated what was on lunch tray and pt  reported agreement with eating lunch which consisted of a cheeseburger, thin liquids and lemon cream pie. The pt took his 1st bite of cheeseburger and then realized that he did not have dentures in. The pt demonstrated significant prolonged mastication difficulty with the need to expel fresh onion. After 1st trial bite was cleared SLP rinsed pt's dentures and provided assist with placement, once dentures in place the pt took a 2nd bite of sandwich, which continued to require significantly increased mastication time in conjunction with a need to expel fresh onion, there were no s/s of aspiration on solid trials. The pt tolerated initial straw sips with a weak cough to follow, all further straw sips were tolerated WFL with no further s/s of aspiration/penetration. The pt was cooperative with max encouragement. He asked for his daughter multiple times during assessment. He refused further trials, reporting, \"I only want to sleep.\" \"Why are you standing there.\" SLP provided " education re: swallowing assessment and recommendations. The pt reported understanding, but it was difficult to assess pt's comprehension level.     Due to the pt's oral dysphagia in regard to significantly increased mastication time resulting in very limited oral intake, Regular - easy chew (IDDSI Level 7) solids with Thin (IDDSI Level 0) liquids will be recommended. SLP will provide f/u care to allow for further assessment of tolerance and education re: compensatory strategies.      Oral/Motor Assessment:  Dentition: Upper and lower dentures, requires cues/reminders to place before eating.  Oral Motor: Could not assess due to pt current cognitive status/cooperation level     General Visit Information:  Living Environment: Pt resides alone in private home with assist from neighbors  Reason for Referral: There is a need to r/o aspiration/penetration and establish the safest, least restrictive diet level.    Baseline Diet: Regular (IDDSI Level 7) solids, Thin (IDDSI Level 0) liquids, food provided by [employer]  Dysphagia Diagnosis: Mod oropharyngeal dysphagia    Education:  Verbal Education : Results and recommendations.

## 2024-07-29 NOTE — PROGRESS NOTES
"Physical Therapy                 Therapy Communication Note    Patient Name: Gigi Carver \"Rui\"  MRN: [id number]  Today's Date: 7/29/2024     Discipline: Physical Therapy    Missed Visit Reason: Missed Visit Reason: Other (Comment) (Attempted PT at 830 and at 930, unable to arouse patient for session. Spoke with RN, states that patient was medicated late last night and the med may be causing him to be sleepy. Will inform supervising therapist.)    Missed Time: Attempt 830 and 930    "

## 2024-07-29 NOTE — PROGRESS NOTES
"Physical Therapy    Physical Therapy Treatment    Patient Name: Gigi Carver \"Rui\"  MRN: [id number]  Today's Date: 7/29/2024  Time Calculation  Start Time: 1100  Stop Time: 1118  Time Calculation (min): 18 min    Assessment/Plan   PT Assessment  PT Assessment Results: Decreased strength, Decreased endurance, Impaired balance, Decreased mobility, Decreased cognition, Impaired judgement, Decreased safety awareness  Rehab Prognosis: Fair  Barriers to Discharge: Confusion  End of Session Communication: Bedside nurse  Assessment Comment: Patient with limited endurance, weakness, and fatigue. Patient requirning mod assist for ambulation. Patient would benefit from further PT to address these limitations and prevent further decline.  End of Session Patient Position: Bed, 2 rail up, Alarm on  PT Plan  Inpatient/Swing Bed or Outpatient: Inpatient  PT Plan  Treatment/Interventions: Bed mobility, Transfer training, Gait training, Therapeutic exercise  PT Plan: Ongoing PT  PT Frequency: 3 times per week  PT Discharge Recommendations: Moderate intensity level of continued care  Equipment Recommended upon Discharge: Wheeled walker  PT Recommended Transfer Status: Assist x1  PT - OK to Discharge: Yes    General Visit Information:   PT  Visit  PT Received On: 07/29/24  General  Prior to Session Communication: Bedside nurse  Patient Position Received: Bed, 2 rail up, Alarm on    Subjective   Patient difficult to arouse, once awake, very confused and pleading to lay back down.  Precautions:  Precautions  Medical Precautions: Fall precautions  Vital Signs:  Vital Signs  SpO2: 97 %    Objective   Pain:  Pain Assessment  Pain Assessment: 0-10  0-10 (Numeric) Pain Score: 0 - No pain  Cognition:  Cognition  Overall Cognitive Status: Impaired  Coordination:  Movements are Fluid and Coordinated: Yes     Treatments:  Therapeutic Exercise  Therapeutic Exercise Performed: Yes  Therapeutic Exercise Activity 1: laqs x 10  Therapeutic Exercise " Activity 2: hip flexion seated x 10  Difficult to keep patient on task, frequent verbal and tactile cues required to perform exercises.    Bed Mobility  Bed Mobility: Yes  Bed Mobility 1  Bed Mobility 1: Supine to sitting, Sitting to supine  Level of Assistance 1: Contact guard    Ambulation/Gait Training  Ambulation/Gait Training Performed: Yes  Ambulation/Gait Training 1  Surface 1: Level tile  Device 1: No device  Gait Support Devices: Gait belt  Assistance 1: Moderate assistance, Maximum verbal cues (Vcs required to maintain erect posture, keep eyes open, and for taking steps. Mod assist required due to being moderately unsteady and for weakness and fatigue.)  Quality of Gait 1: Forward flexed posture, Antalgic  Comments/Distance (ft) 1: 10  Transfers  Transfer: Yes  Transfer 1  Technique 1: Sit to stand, Stand to sit  Transfer Device 1: Gait belt  Transfer Level of Assistance 1: Contact guard    Outcome Measures:  [facility] Basic Mobility  Turning from your back to your side while in a flat bed without using bedrails: A little  Moving from lying on your back to sitting on the side of a flat bed without using bedrails: A little  Moving to and from bed to chair (including a wheelchair): A little  Standing up from a chair using your arms (e.g. wheelchair or bedside chair): A little  To walk in hospital room: A lot  Climbing 3-5 steps with railing: A lot  Basic Mobility - Total Score: 16    Education Documentation  Mobility Training, taught by Park Joshua PTA at 7/29/2024 11:28 AM.  Learner: Patient  Readiness: Acceptance  Method: Explanation  Response: Verbalizes Understanding, Needs Reinforcement  Comment: Educated patient on safe ambulation without a device, fair follow through with cues.    Education Comments  No comments found.      Encounter Problems       Encounter Problems (Active)       Mobility       transfers  (Progressing)       Start:  07/25/24    Expected End:  08/01/24       Pt will transfer  from sit to stand to sit and from bed to chair to bed with LRAD mod I demonstrating increased safety awareness and balance         ambulation (Progressing)       Start:  07/25/24    Expected End:  08/01/24       Pt will ambulate with LRAD 200' x 2 with mod I and improved gait mechanics         stairs (Progressing)       Start:  07/25/24    Expected End:  08/01/24       Pt will ascend and descend 8 stairs with handrail to simulate home set up with mod I         Balance (Progressing)       Start:  07/25/24    Expected End:  08/01/24       Pt will improve dynamic standing balance to G for improve mobility            Pain - Adult
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
## 2024-07-29 NOTE — CARE PLAN
The patient's goals for the shift include sleep    The clinical goals for the shift include pt will report relief of anxiety    Over the shift, the patient did  make progress toward the following goals. Pt received anti anxiety medication this shift for increased anxiety, effective. Pt was able to sleep this shift.
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
## 2024-07-30 ENCOUNTER — APPOINTMENT (OUTPATIENT)
Dept: RADIOLOGY | Facility: HOSPITAL | Age: 79
End: 2024-07-30
Payer: MEDICARE

## 2024-07-30 ENCOUNTER — APPOINTMENT (OUTPATIENT)
Dept: CARDIOLOGY | Facility: HOSPITAL | Age: 79
End: 2024-07-30
Payer: MEDICARE

## 2024-07-30 ENCOUNTER — HOSPITAL ENCOUNTER (OUTPATIENT)
Facility: HOSPITAL | Age: 79
Setting detail: OBSERVATION
Discharge: NURSING, REHAB OR LONG-TERM CARE | End: 2024-08-01
Attending: EMERGENCY MEDICINE | Admitting: INTERNAL MEDICINE
Payer: MEDICARE

## 2024-07-30 VITALS
SYSTOLIC BLOOD PRESSURE: 96 MMHG | HEART RATE: 102 BPM | DIASTOLIC BLOOD PRESSURE: 78 MMHG | BODY MASS INDEX: 24.12 KG/M2 | RESPIRATION RATE: 18 BRPM | HEIGHT: 67 IN | TEMPERATURE: 98.1 F | OXYGEN SATURATION: 97 % | WEIGHT: 153.66 LBS
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
## 2024-07-30 DIAGNOSIS — J96.11 CHRONIC HYPOXIC RESPIRATORY FAILURE (MULTI): ICD-10-CM

## 2024-07-30 DIAGNOSIS — R41.82 ALTERED MENTAL STATUS, UNSPECIFIED ALTERED MENTAL STATUS TYPE: ICD-10-CM

## 2024-07-30 DIAGNOSIS — R94.31 ABNORMAL ELECTROCARDIOGRAM (ECG) (EKG): ICD-10-CM

## 2024-07-30 DIAGNOSIS — R79.89 ELEVATED TROPONIN: ICD-10-CM

## 2024-07-30 DIAGNOSIS — R79.89 ELEVATED TROPONIN I LEVEL: ICD-10-CM

## 2024-07-30 LAB
ANION GAP SERPL CALC-SCNC: <7 MMOL/L (ref 10–20)
APPEARANCE UR: CLEAR
BASOPHILS # BLD AUTO: 0.01 X10*3/UL (ref 0–0.1)
BASOPHILS NFR BLD AUTO: 0.2 %
BILIRUB UR STRIP.AUTO-MCNC: NEGATIVE MG/DL
BUN SERPL-MCNC: 23 MG/DL (ref 6–23)
CALCIUM SERPL-MCNC: 8.1 MG/DL (ref 8.6–10.3)
CHLORIDE SERPL-SCNC: 99 MMOL/L (ref 98–107)
CO2 SERPL-SCNC: 42 MMOL/L (ref 21–32)
COLOR UR: YELLOW
CREAT SERPL-MCNC: 0.58 MG/DL (ref 0.5–1.3)
D DIMER PPP FEU-MCNC: 1772 NG/ML FEU
EGFRCR SERPLBLD CKD-EPI 2021: >90 ML/MIN/1.73M*2
EOSINOPHIL # BLD AUTO: 0 X10*3/UL (ref 0–0.4)
EOSINOPHIL NFR BLD AUTO: 0 %
ERYTHROCYTE [DISTWIDTH] IN BLOOD BY AUTOMATED COUNT: 13.6 % (ref 11.5–14.5)
ERYTHROCYTE [DISTWIDTH] IN BLOOD BY AUTOMATED COUNT: 13.9 % (ref 11.5–14.5)
GLUCOSE SERPL-MCNC: 92 MG/DL (ref 74–99)
GLUCOSE UR STRIP.AUTO-MCNC: ABNORMAL MG/DL
HCT VFR BLD AUTO: 38.8 % (ref 41–52)
HCT VFR BLD AUTO: 39.2 % (ref 41–52)
HGB BLD-MCNC: 12.6 G/DL (ref 13.5–17.5)
HGB BLD-MCNC: 12.8 G/DL (ref 13.5–17.5)
HOLD SPECIMEN: NORMAL
HYALINE CASTS #/AREA URNS AUTO: ABNORMAL /LPF
IMM GRANULOCYTES # BLD AUTO: 0.02 X10*3/UL (ref 0–0.5)
IMM GRANULOCYTES NFR BLD AUTO: 0.3 % (ref 0–0.9)
KETONES UR STRIP.AUTO-MCNC: NEGATIVE MG/DL
LEUKOCYTE ESTERASE UR QL STRIP.AUTO: ABNORMAL
LYMPHOCYTES # BLD AUTO: 0.41 X10*3/UL (ref 0.8–3)
LYMPHOCYTES NFR BLD AUTO: 6.4 %
MCH RBC QN AUTO: 34.2 PG (ref 26–34)
MCH RBC QN AUTO: 34.5 PG (ref 26–34)
MCHC RBC AUTO-ENTMCNC: 32.5 G/DL (ref 32–36)
MCHC RBC AUTO-ENTMCNC: 32.7 G/DL (ref 32–36)
MCV RBC AUTO: 105 FL (ref 80–100)
MCV RBC AUTO: 106 FL (ref 80–100)
MIXED CELL CASTS #/AREA UR COMP ASSIST: ABNORMAL /LPF
MONOCYTES # BLD AUTO: 0.56 X10*3/UL (ref 0.05–0.8)
MONOCYTES NFR BLD AUTO: 8.8 %
MUCOUS THREADS #/AREA URNS AUTO: ABNORMAL /LPF
NEUTROPHILS # BLD AUTO: 5.38 X10*3/UL (ref 1.6–5.5)
NEUTROPHILS NFR BLD AUTO: 84.3 %
NITRITE UR QL STRIP.AUTO: NEGATIVE
NRBC BLD-RTO: 0 /100 WBCS (ref 0–0)
NRBC BLD-RTO: 0 /100 WBCS (ref 0–0)
PH UR STRIP.AUTO: 6 [PH]
PLATELET # BLD AUTO: 90 X10*3/UL (ref 150–450)
PLATELET # BLD AUTO: 91 X10*3/UL (ref 150–450)
POTASSIUM SERPL-SCNC: 3.6 MMOL/L (ref 3.5–5.3)
PROT UR STRIP.AUTO-MCNC: ABNORMAL MG/DL
RBC # BLD AUTO: 3.68 X10*6/UL (ref 4.5–5.9)
RBC # BLD AUTO: 3.71 X10*6/UL (ref 4.5–5.9)
RBC # UR STRIP.AUTO: NEGATIVE /UL
RBC #/AREA URNS AUTO: ABNORMAL /HPF
SODIUM SERPL-SCNC: 142 MMOL/L (ref 136–145)
SP GR UR STRIP.AUTO: 1.03
SQUAMOUS #/AREA URNS AUTO: ABNORMAL /HPF
UROBILINOGEN UR STRIP.AUTO-MCNC: NORMAL MG/DL
WBC # BLD AUTO: 4.9 X10*3/UL (ref 4.4–11.3)
WBC # BLD AUTO: 6.4 X10*3/UL (ref 4.4–11.3)
WBC #/AREA URNS AUTO: ABNORMAL /HPF

## 2024-07-30 PROCEDURE — 93005 ELECTROCARDIOGRAM TRACING: CPT

## 2024-07-30 PROCEDURE — 71045 X-RAY EXAM CHEST 1 VIEW: CPT | Performed by: RADIOLOGY

## 2024-07-30 PROCEDURE — 80053 COMPREHEN METABOLIC PANEL: CPT | Mod: IPSPLIT | Performed by: NURSE PRACTITIONER

## 2024-07-30 PROCEDURE — 2500000002 HC RX 250 W HCPCS SELF ADMINISTERED DRUGS (ALT 637 FOR MEDICARE OP, ALT 636 FOR OP/ED): Mod: IPSPLIT | Performed by: INTERNAL MEDICINE

## 2024-07-30 PROCEDURE — 36415 COLL VENOUS BLD VENIPUNCTURE: CPT | Mod: IPSPLIT

## 2024-07-30 PROCEDURE — 94760 N-INVAS EAR/PLS OXIMETRY 1: CPT | Mod: IPSPLIT

## 2024-07-30 PROCEDURE — 2500000001 HC RX 250 WO HCPCS SELF ADMINISTERED DRUGS (ALT 637 FOR MEDICARE OP): Mod: IPSPLIT | Performed by: NURSE PRACTITIONER

## 2024-07-30 PROCEDURE — 2500000004 HC RX 250 GENERAL PHARMACY W/ HCPCS (ALT 636 FOR OP/ED): Mod: IPSPLIT | Performed by: NURSE PRACTITIONER

## 2024-07-30 PROCEDURE — 71250 CT THORAX DX C-: CPT

## 2024-07-30 PROCEDURE — 70450 CT HEAD/BRAIN W/O DYE: CPT

## 2024-07-30 PROCEDURE — 99239 HOSP IP/OBS DSCHRG MGMT >30: CPT

## 2024-07-30 PROCEDURE — 84484 ASSAY OF TROPONIN QUANT: CPT | Performed by: EMERGENCY MEDICINE

## 2024-07-30 PROCEDURE — 83735 ASSAY OF MAGNESIUM: CPT | Performed by: EMERGENCY MEDICINE

## 2024-07-30 PROCEDURE — 81001 URINALYSIS AUTO W/SCOPE: CPT | Performed by: EMERGENCY MEDICINE

## 2024-07-30 PROCEDURE — 97116 GAIT TRAINING THERAPY: CPT | Mod: GP,CQ,IPSPLIT

## 2024-07-30 PROCEDURE — S4991 NICOTINE PATCH NONLEGEND: HCPCS | Mod: IPSPLIT

## 2024-07-30 PROCEDURE — 87086 URINE CULTURE/COLONY COUNT: CPT | Mod: GENLAB | Performed by: EMERGENCY MEDICINE

## 2024-07-30 PROCEDURE — 94640 AIRWAY INHALATION TREATMENT: CPT | Mod: IPSPLIT

## 2024-07-30 PROCEDURE — 97110 THERAPEUTIC EXERCISES: CPT | Mod: GP,CQ,IPSPLIT

## 2024-07-30 PROCEDURE — 2500000002 HC RX 250 W HCPCS SELF ADMINISTERED DRUGS (ALT 637 FOR MEDICARE OP, ALT 636 FOR OP/ED): Mod: IPSPLIT | Performed by: NURSE PRACTITIONER

## 2024-07-30 PROCEDURE — 71045 X-RAY EXAM CHEST 1 VIEW: CPT | Mod: IPSPLIT

## 2024-07-30 PROCEDURE — 83880 ASSAY OF NATRIURETIC PEPTIDE: CPT

## 2024-07-30 PROCEDURE — 2500000005 HC RX 250 GENERAL PHARMACY W/O HCPCS: Mod: IPSPLIT

## 2024-07-30 PROCEDURE — 99285 EMERGENCY DEPT VISIT HI MDM: CPT

## 2024-07-30 PROCEDURE — 71250 CT THORAX DX C-: CPT | Performed by: RADIOLOGY

## 2024-07-30 PROCEDURE — 2500000001 HC RX 250 WO HCPCS SELF ADMINISTERED DRUGS (ALT 637 FOR MEDICARE OP): Mod: IPSPLIT

## 2024-07-30 PROCEDURE — 70450 CT HEAD/BRAIN W/O DYE: CPT | Performed by: RADIOLOGY

## 2024-07-30 PROCEDURE — 85379 FIBRIN DEGRADATION QUANT: CPT | Mod: IPSPLIT

## 2024-07-30 PROCEDURE — 85025 COMPLETE CBC W/AUTO DIFF WBC: CPT | Performed by: EMERGENCY MEDICINE

## 2024-07-30 PROCEDURE — 2500000001 HC RX 250 WO HCPCS SELF ADMINISTERED DRUGS (ALT 637 FOR MEDICARE OP): Mod: IPSPLIT | Performed by: SURGERY

## 2024-07-30 PROCEDURE — 80048 BASIC METABOLIC PNL TOTAL CA: CPT | Performed by: EMERGENCY MEDICINE

## 2024-07-30 PROCEDURE — 36415 COLL VENOUS BLD VENIPUNCTURE: CPT | Performed by: EMERGENCY MEDICINE

## 2024-07-30 PROCEDURE — 2500000002 HC RX 250 W HCPCS SELF ADMINISTERED DRUGS (ALT 637 FOR MEDICARE OP, ALT 636 FOR OP/ED): Mod: IPSPLIT

## 2024-07-30 PROCEDURE — 85027 COMPLETE CBC AUTOMATED: CPT | Mod: IPSPLIT | Performed by: NURSE PRACTITIONER

## 2024-07-30 RX ORDER — HYDROXYZINE PAMOATE 25 MG/1
50 CAPSULE ORAL ONCE
Status: COMPLETED | OUTPATIENT
Start: 2024-07-30 | End: 2024-07-30

## 2024-07-30 RX ORDER — LOSARTAN POTASSIUM 50 MG/1
50 TABLET ORAL DAILY
Start: 2024-07-31 | End: 2024-09-29

## 2024-07-30 RX ORDER — SUCRALFATE 1 G/10ML
1 SUSPENSION ORAL EVERY 6 HOURS SCHEDULED
Start: 2024-07-30 | End: 2024-09-28

## 2024-07-30 RX ORDER — PREDNISONE 10 MG/1
TABLET ORAL
Start: 2024-07-31 | End: 2024-08-10

## 2024-07-30 ASSESSMENT — COGNITIVE AND FUNCTIONAL STATUS - GENERAL
MOVING TO AND FROM BED TO CHAIR: A LITTLE
CLIMB 3 TO 5 STEPS WITH RAILING: TOTAL
TURNING FROM BACK TO SIDE WHILE IN FLAT BAD: A LITTLE
STANDING UP FROM CHAIR USING ARMS: A LITTLE
WALKING IN HOSPITAL ROOM: A LITTLE
MOBILITY SCORE: 16
MOVING FROM LYING ON BACK TO SITTING ON SIDE OF FLAT BED WITH BEDRAILS: A LITTLE

## 2024-07-30 ASSESSMENT — PAIN SCALES - GENERAL
PAINLEVEL_OUTOF10: 0 - NO PAIN
PAINLEVEL_OUTOF10: 0 - NO PAIN

## 2024-07-30 ASSESSMENT — PAIN - FUNCTIONAL ASSESSMENT
PAIN_FUNCTIONAL_ASSESSMENT: 0-10
PAIN_FUNCTIONAL_ASSESSMENT: 0-10

## 2024-07-30 ASSESSMENT — LIFESTYLE VARIABLES
TOTAL SCORE: 0
ANXIETY: NO ANXIETY, AT EASE
BLOOD PRESSURE: 112/65
VISUAL DISTURBANCES: NOT PRESENT
AGITATION: NORMAL ACTIVITY
PULSE: 86
HEADACHE, FULLNESS IN HEAD: NOT PRESENT
ORIENTATION AND CLOUDING OF SENSORIUM: ORIENTED AND CAN DO SERIAL ADDITIONS
PAROXYSMAL SWEATS: NO SWEAT VISIBLE
AUDITORY DISTURBANCES: NOT PRESENT
TREMOR: NO TREMOR
NAUSEA AND VOMITING: NO NAUSEA AND NO VOMITING

## 2024-07-30 NOTE — NURSING NOTE
Assumed care of pt, pt sleeping in bed, bed alarm on, on 2L oxygen, will continue to monitor.       2030- pt yelling out nurse repeatedly that he can not breath, pt on 2L oxgyen pulse ox 98%, deep breathing exercises done, toileted, back in bed with alarm on. Yelling continued, dimmed lights, lowered volume of TV, medicated with atarax PRN for anxiety, will continue to monitor.     0100- pt continues to yell out nurse, wants somebody in the room with him, NP notified and supervisor notified sitter in room, pt fell asleep.

## 2024-07-30 NOTE — PROGRESS NOTES
"Physical Therapy    Physical Therapy Treatment    Patient Name: Gigi Carver \"Rui\"  MRN: [id number]  Today's Date: 7/30/2024  Time Calculation  Start Time: 0812  Stop Time: 0836  Time Calculation (min): 24 min    Assessment/Plan   PT Assessment  PT Assessment Results: Decreased strength, Decreased endurance, Impaired balance, Decreased mobility, Decreased safety awareness  Rehab Prognosis: Good  Barriers to Discharge: confused at times  End of Session Communication: PCT/NA/CTA, Bedside nurse  Assessment Comment: Patient with limited endurance, weakness, and fatigue. Patient requirning min to mod assist for ambulation. Patient would benefit from further PT to address these limitations and prevent further decline.  End of Session Patient Position: Bed, 2 rail up, Alarm on  PT Plan  Inpatient/Swing Bed or Outpatient: Inpatient  PT Plan  Treatment/Interventions: Bed mobility, Transfer training, Gait training, Therapeutic exercise  PT Plan: Ongoing PT  PT Frequency: 3 times per week  PT Discharge Recommendations: Moderate intensity level of continued care  Equipment Recommended upon Discharge: Wheeled walker  PT Recommended Transfer Status: Assist x1  PT - OK to Discharge: Yes    General Visit Information:   PT  Visit  PT Received On: 07/30/24  General  Prior to Session Communication: Bedside nurse, PCT/NA/CTA  Patient Position Received: Up in chair, Alarm on    Subjective   Patient pleasant and agreeable to session, very cooperative.  Precautions:  Precautions  Medical Precautions: Fall precautions  Vital Signs:  Vital Signs  SpO2: 91 % (O2 out of nose, replaced O2 and SPO2 increased to 95%)    Objective   Pain:  Pain Assessment  Pain Assessment: 0-10  0-10 (Numeric) Pain Score: 0 - No pain  Cognition:  Cognition  Overall Cognitive Status: Impaired  Impulsive: Mildly  Coordination:  Movements are Fluid and Coordinated: Yes     Treatments:  Therapeutic Exercise  Therapeutic Exercise Performed: Yes  Therapeutic Exercise " Activity 1: quad sets x 10 with fair quad contractions bilaterally  Therapeutic Exercise Activity 2: slr x 10 with vcs required to slow pace and to tighten contraction to eliminate quad lag  Therapeutic Exercise Activity 3: hip abduction/adduction supine x 10  Therapeutic Exercise Activity 4: ankle pumps x 10    Bed Mobility  Bed Mobility: Yes  Bed Mobility 1  Bed Mobility 1: Sitting to supine  Level of Assistance 1: Contact guard    Ambulation/Gait Training  Ambulation/Gait Training Performed: Yes  Ambulation/Gait Training 1  Surface 1: Level tile  Device 1: No device  Gait Support Devices: Gait belt  Assistance 1: Minimum assistance, Minimal verbal cues (Vcs required to maintain erect posture and for directional changes, min assist required to prevent LOB due to being mildly unsteady.)  Quality of Gait 1: Inconsistent stride length, Forward flexed posture, Antalgic  Comments/Distance (ft) 1: 12x2 (Attempted to increase ambulation distance, however, patient fatigued and requesting to return to bed.)  Transfers  Transfer: Yes  Transfer 1  Technique 1: Sit to stand, Stand to sit  Transfer Device 1: Gait belt  Transfer Level of Assistance 1: Minimum assistance (Min assist required for push off.)    Outcome Measures:  [facility] Basic Mobility  Turning from your back to your side while in a flat bed without using bedrails: A little  Moving from lying on your back to sitting on the side of a flat bed without using bedrails: A little  Moving to and from bed to chair (including a wheelchair): A little  Standing up from a chair using your arms (e.g. wheelchair or bedside chair): A little  To walk in hospital room: A little  Climbing 3-5 steps with railing: Total  Basic Mobility - Total Score: 16    Education Documentation  Mobility Training, taught by Park Joshua PTA at 7/30/2024  9:31 AM.  Learner: Patient  Readiness: Acceptance  Method: Explanation  Response: Verbalizes Understanding, Needs Reinforcement  Comment:  Educated patient on standing erect with ambulation, fair follow through with cues.    Education Comments  No comments found.      Encounter Problems       Encounter Problems (Active)       Mobility       transfers  (Progressing)       Start:  07/25/24    Expected End:  08/01/24       Pt will transfer from sit to stand to sit and from bed to chair to bed with LRAD mod I demonstrating increased safety awareness and balance         ambulation (Progressing)       Start:  07/25/24    Expected End:  08/01/24       Pt will ambulate with LRAD 200' x 2 with mod I and improved gait mechanics         stairs (Progressing)       Start:  07/25/24    Expected End:  08/01/24       Pt will ascend and descend 8 stairs with handrail to simulate home set up with mod I         Balance (Progressing)       Start:  07/25/24    Expected End:  08/01/24       Pt will improve dynamic standing balance to G for improve mobility            Pain - Adult

## 2024-07-30 NOTE — CARE PLAN
The patient's goals for the shift include sleep    The clinical goals for the shift include Pt will maintain SP02 >92%    Over the shift, the patient did make progress toward the following goals.     0150 Assumed care of patient. Assessment completed as charted. Patient anxious, reoriented and provided emotional support. Pulse ox 93% on 4 liters. Staff at bedside.    0200 Patient fell asleep. Call light within reach.    0300 Patient screaming out for staff, pulse ox 97%. Medicated with Vistaril as ordered. States he feels sob,RT called for breathing treatment.    0309 Patient receiving breathing treatment.    0500 Patient walks to bathroom with assistance on 2.5 liters of oxygen. Very sob with minimal exertion. Pulse ox desat to 82%. Oxygen increased to 4 liters. Takes several minutes to recover when resting. Secure chat to provider.    0508 Chest x ray ordered.    0510 Called RT for breathing treatment.    0520 Patient receiving breathing treatment.    0540 Pulse ox 99% after breathing treatment.    0610 Chest x ray completed.    0635 Patient laying in bed, watching TV. Pulse ox 99%.

## 2024-07-30 NOTE — DISCHARGE SUMMARY
Discharge Diagnosis  COPD exacerbation (Multi)    Issues Requiring Follow-Up  Follow up with PCP, GI, Psych, neuro     Discharge Meds     Your medication list        START taking these medications        Instructions Last Dose Given Next Dose Due   losartan 50 mg tablet  Commonly known as: Cozaar  Start taking on: July 31, 2024      Take 1 tablet (50 mg) by mouth once daily.       predniSONE 10 mg tablet  Commonly known as: Deltasone  Start taking on: July 31, 2024      Take 4 tablets (40 mg) by mouth once daily for 1 day, THEN 3 tablets (30 mg) once daily for 3 days, THEN 2 tablets (20 mg) once daily for 3 days, THEN 1 tablet (10 mg) once daily for 3 days.       sucralfate 100 mg/mL suspension  Commonly known as: Carafate      Take 10 mL (1 g) by mouth every 6 hours.              CONTINUE taking these medications        Instructions Last Dose Given Next Dose Due   acetaminophen 325 mg tablet  Commonly known as: Tylenol           aspirin 81 mg EC tablet           hydrOXYzine HCL 25 mg tablet  Commonly known as: Atarax      Take 1 tablet (25 mg) by mouth 2 times daily (morning and late afternoon).       OLANZapine 5 mg tablet  Commonly known as: ZyPREXA      Take 1 tablet (5 mg) by mouth once daily at bedtime.       pantoprazole 40 mg EC tablet  Commonly known as: ProtoNix      Take 1 tablet (40 mg) by mouth once daily in the morning. Take before meals. Do not crush, chew, or split.       tamsulosin 0.4 mg 24 hr capsule  Commonly known as: Flomax      Take 1 capsule (0.4 mg) by mouth once daily.              STOP taking these medications      ciprofloxacin 500 mg tablet  Commonly known as: Cipro        metroNIDAZOLE 500 mg tablet  Commonly known as: Flagyl                  Where to Get Your Medications        Information about where to get these medications is not yet available    Ask your nurse or doctor about these medications  losartan 50 mg tablet  predniSONE 10 mg tablet  sucralfate 100 mg/mL suspension    "      Test Results Pending At Discharge  Pending Labs       Order Current Status    Surgical Pathology Exam In process            Hospital Course   Gigi Carver \"Rui\" is a 79 y.o. male presenting with abdominal pain. Pt was recently admitted to [facility] for concern of mesenteric ischemia, UTI, and altered mental. Pt ultimately left AMA. Pt states he came to our ER for abdominal pain. Pt states the abdominal pain comes on about 15-20 mins after he eats and is usually resolved by pepto bismol or tylenol. Pt also reports diarrhea after eating but states nothing makes it better or worse. Upon examination, pt has no abdominal pain. Pt is alert and oriented but is very forgetful and keeps repeating the same questions.      ED VS: T36.7, HR 92, RR 19, /74, Sp02 92%RA     Imaging: CT abd/pelvis- 1. Mild pericardial effusion with a thickness measuring 1.0 cm.  2. Moderate to severe centrilobular emphysema.  3. There is thickening of the distal esophagus measuring up to 0.8 cm.  There is thickening of the gastric wall. Correlation regarding  gastritis is suggested.  4. Nodular contour of the liver may be associated with cirrhosis. No  hepatic mass or perihepatic ascites.  5. Prominent portal vessels measuring up to 1.3 cm.  6. Scattered colonic diverticulosis without acute diverticulitis.  7. Abnormal bladder wall thickening measuring up to 0.7 cm.  Correlation regarding cystitis is suggested.  8. Prominent prostate measuring 4.5 x 4.8 cm.  9. Small umbilical hernia containing fat only.  10. 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     CT chest- Severe emphysema with multiple pulmonary nodules are unchanged.  No focal regions of airspace consolidation.  Small amount of mucus/debris within the distal trachea is new compared  to exam 7/15/2024     Labs: Glu 111, Na 140, K 3.7, Bun/creat 19/0.85, Trop 39, , WBC 3.5, H/H 13.7/40.4, Plt 111    Hospital Course:  #COPD " exacerbation  #Acute hypoxic respiratory failure   #Tobacco use disorder   -CT chest: Severe emphysema with multiple pulmonary nodules are unchanged. No focal regions of airspace consolidation. Small amount of mucus/debris within the distal trachea is new compared to exam 7/15/2024  -WBC 3.5 > 3.8  -Lactate 1.2  -Supplemental oxygen to maintain an sp02 greater than 92%  -Currently on 2L, baseline RA  -Nicotine patch   -Solumedrol q12 then taper to oral prednisone taper  -Bronchodilators   -Completed IV azithro   -RT to eval/treat  -Encourage cessation  -awaiting placement     #Duodenitis   #Liver cirrhosis   #Superior mesenteric stenosis   -Recently seen by GI and Vascular at [facility]  -Rec follow up with Vasc for SMA stenosis   -CT angiogram: 1. Mild pericardial effusion with a thickness measuring 1.0 cm. 2. Moderate to severe centrilobular emphysema. 3. There is thickening of the distal esophagus measuring up to 0.8 cm. There is thickening of the gastric wall. Correlation regarding gastritis is suggested. 4. Nodular contour of the liver may be associated with cirrhosis. No hepatic mass or perihepatic ascites. 5. Prominent portal vessels measuring up to 1.3 cm. 6. Scattered colonic diverticulosis without acute diverticulitis. 7. Abnormal bladder wall thickening measuring up to 0.7 cm. Correlation regarding cystitis is suggested. 8. Prominent prostate measuring 4.5 x 4.8 cm. 9. Small umbilical hernia containing fat only. 10. 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  -GI consult, appreciate recs   -Acute care surgery consult, appreciate recs   -EGD showed duodenitis within duodenal bulb; will continue PPI and carafate indefinitely per Dr. Myles   -Continue PPI, sucralfate   -Continue ASA     #UTI, resolved   #BPH  -Urine cx from 7/12 grew enterococcus faecalis resistant to cipro  -Was dc on cipro  -Start PO augmentin; discontinue   - leuks, 1-5 WBC   -Urine cx negative    -Continue tamsulosin      #Pancytopenia, improving   -WBC 2.2  > 5.2 > 3.3 > 3.8 > stable  -RBC 3.80 > 3.94 > 3.51 > 4.04 > stable  -H/H 13.3/40.1 > 13.6/41.0 > 12.0/36.8 > 13.8/42.0 > stable  -Plt 85 > 110 > 96 > 89  -Hold AC, lovenox   -Daily CBC  -Monitor   -Outpatient follow up with hepatology      #Hx of concussion with brain injury   #Concern for dementia  #Anxiety   #Depression  -Safety precautions  -PT/OT evals  -Psych consult, appreciate recs   -Continue hydroxyzine, olanzapine  -awaiting placement     DVT ppx: ASA   PUD ppx: pantoprazole   F: PRN  E: Replete per protocol  N: Regular   A: PIV   Disposition: Pt stable for discharge to SNF. Will follow up with PCP. Pulm. Psych. Neuro. Will continue on meds ordered from [facility]. DC on 2L NC     Code Status: DNR/DNI    Total cumulative time spent in preparation of this discharge including documentation review, coordination of care with the medical team including PT/SW/care coordinators and treating consultants, discussion with patient and pertinent family members and finalization of prescriptions, followup appointments and this discharge summary was approximately  45 minutes. Over 50% of the time was spent face-to-face counseling the patient on diagnosis, prescriptions, and follow up appointments.     Pertinent Physical Exam At Time of Discharge  Physical Exam  Vitals reviewed.   HENT:      Head: Normocephalic and atraumatic.      Right Ear: External ear normal.      Left Ear: External ear normal.      Nose: Nose normal.      Mouth/Throat:      Pharynx: Oropharynx is clear.   Eyes:      Conjunctiva/sclera: Conjunctivae normal.   Cardiovascular:      Rate and Rhythm: Normal rate and regular rhythm.      Pulses: Normal pulses.      Heart sounds: Normal heart sounds.   Pulmonary:      Breath sounds: Decreased breath sounds present.   Abdominal:      General: Bowel sounds are normal.      Palpations: Abdomen is soft.   Musculoskeletal:         General: Normal  range of motion.      Cervical back: Normal range of motion and neck supple.   Skin:     General: Skin is dry.   Neurological:      General: No focal deficit present.      Mental Status: He is alert.      Comments: forgetful   Psychiatric:         Mood and Affect: Mood normal.         Behavior: Behavior normal.         Outpatient Follow-Up  Future Appointments   Date Time Provider Department Center   10/23/2024  2:30 PM Keshia Griffith PA-C [id number]         MARCELO Guajardo-CNP

## 2024-07-30 NOTE — PROGRESS NOTES
07/30/24 1150   Discharge Planning   Assistance Needed Patient being discharged to [facility] today. Patient and daughter aware. Awaiting transportation time.   Who is requesting discharge planning? Provider   Home or Post Acute Services Post acute facilities (Rehab/SNF/etc)   Type of Post Acute Facility Services Skilled nursing   Expected Discharge Disposition SNF   Does the patient need discharge transport arranged? Yes   RoundTrip coordination needed? Yes   Has discharge transport been arranged? No   What day is the transport expected? 07/30/24

## 2024-07-31 ENCOUNTER — APPOINTMENT (OUTPATIENT)
Dept: CARDIOLOGY | Facility: HOSPITAL | Age: 79
End: 2024-07-31
Payer: MEDICARE

## 2024-07-31 PROBLEM — I10 BENIGN ESSENTIAL HTN: Status: ACTIVE | Noted: 2024-07-31

## 2024-07-31 PROBLEM — R79.89 ELEVATED TROPONIN I LEVEL: Status: ACTIVE | Noted: 2024-07-31

## 2024-07-31 PROBLEM — J44.9 COPD (CHRONIC OBSTRUCTIVE PULMONARY DISEASE) (MULTI): Status: ACTIVE | Noted: 2024-07-24

## 2024-07-31 PROBLEM — J96.11 CHRONIC HYPOXIC RESPIRATORY FAILURE (MULTI): Status: ACTIVE | Noted: 2024-07-31

## 2024-07-31 PROBLEM — D69.6 THROMBOCYTOPENIA (CMS-HCC): Status: ACTIVE | Noted: 2024-07-31

## 2024-07-31 PROBLEM — R29.6 RECURRENT FALLS: Status: ACTIVE | Noted: 2024-07-31

## 2024-07-31 LAB
ALBUMIN SERPL BCP-MCNC: 3.2 G/DL (ref 3.4–5)
ALP SERPL-CCNC: 91 U/L (ref 33–136)
ALT SERPL W P-5'-P-CCNC: 61 U/L (ref 10–52)
ANION GAP SERPL CALC-SCNC: <7 MMOL/L (ref 10–20)
AORTIC VALVE PEAK VELOCITY: 1.32 M/S
AST SERPL W P-5'-P-CCNC: 30 U/L (ref 9–39)
ATRIAL RATE: 100 BPM
AV PEAK GRADIENT: 7 MMHG
AVA (PEAK VEL): 2.57 CM2
BILIRUB SERPL-MCNC: 0.6 MG/DL (ref 0–1.2)
BNP SERPL-MCNC: 77 PG/ML (ref 0–99)
BUN SERPL-MCNC: 29 MG/DL (ref 6–23)
CALCIUM SERPL-MCNC: 7.9 MG/DL (ref 8.6–10.3)
CARDIAC TROPONIN I PNL SERPL HS: 51 NG/L (ref 0–20)
CARDIAC TROPONIN I PNL SERPL HS: 60 NG/L (ref 0–20)
CHLORIDE SERPL-SCNC: 101 MMOL/L (ref 98–107)
CO2 SERPL-SCNC: 38 MMOL/L (ref 21–32)
CREAT SERPL-MCNC: 0.79 MG/DL (ref 0.5–1.3)
EGFRCR SERPLBLD CKD-EPI 2021: 90 ML/MIN/1.73M*2
EJECTION FRACTION APICAL 4 CHAMBER: 61.9
EJECTION FRACTION: 58 %
GLUCOSE SERPL-MCNC: 139 MG/DL (ref 74–99)
LEFT ATRIUM VOLUME AREA LENGTH INDEX BSA: 17.5 ML/M2
LEFT VENTRICLE INTERNAL DIMENSION DIASTOLE: 4.93 CM (ref 3.5–6)
LEFT VENTRICULAR OUTFLOW TRACT DIAMETER: 2 CM
MAGNESIUM SERPL-MCNC: 1.94 MG/DL (ref 1.6–2.4)
MITRAL VALVE E/A RATIO: 0.72
P AXIS: 48 DEGREES
P OFFSET: 211 MS
P ONSET: 166 MS
POTASSIUM SERPL-SCNC: 4.4 MMOL/L (ref 3.5–5.3)
PR INTERVAL: 120 MS
PROT SERPL-MCNC: 5.4 G/DL (ref 6.4–8.2)
Q ONSET: 226 MS
QRS COUNT: 17 BEATS
QRS DURATION: 132 MS
QT INTERVAL: 394 MS
QTC CALCULATION(BAZETT): 508 MS
QTC FREDERICIA: 467 MS
R AXIS: 19 DEGREES
RIGHT VENTRICLE FREE WALL PEAK S': 7.94 CM/S
RIGHT VENTRICLE PEAK SYSTOLIC PRESSURE: 31.7 MMHG
SODIUM SERPL-SCNC: 141 MMOL/L (ref 136–145)
T AXIS: 46 DEGREES
T OFFSET: 423 MS
TRICUSPID ANNULAR PLANE SYSTOLIC EXCURSION: 1.7 CM
VENTRICULAR RATE: 100 BPM

## 2024-07-31 PROCEDURE — 99222 1ST HOSP IP/OBS MODERATE 55: CPT | Performed by: NURSE PRACTITIONER

## 2024-07-31 PROCEDURE — 2500000001 HC RX 250 WO HCPCS SELF ADMINISTERED DRUGS (ALT 637 FOR MEDICARE OP): Performed by: NURSE PRACTITIONER

## 2024-07-31 PROCEDURE — 2500000004 HC RX 250 GENERAL PHARMACY W/ HCPCS (ALT 636 FOR OP/ED): Performed by: NURSE PRACTITIONER

## 2024-07-31 PROCEDURE — 94760 N-INVAS EAR/PLS OXIMETRY 1: CPT

## 2024-07-31 PROCEDURE — 96361 HYDRATE IV INFUSION ADD-ON: CPT

## 2024-07-31 PROCEDURE — 2500000004 HC RX 250 GENERAL PHARMACY W/ HCPCS (ALT 636 FOR OP/ED)

## 2024-07-31 PROCEDURE — 2500000001 HC RX 250 WO HCPCS SELF ADMINISTERED DRUGS (ALT 637 FOR MEDICARE OP): Performed by: EMERGENCY MEDICINE

## 2024-07-31 PROCEDURE — 2500000002 HC RX 250 W HCPCS SELF ADMINISTERED DRUGS (ALT 637 FOR MEDICARE OP, ALT 636 FOR OP/ED): Performed by: NURSE PRACTITIONER

## 2024-07-31 PROCEDURE — 2500000005 HC RX 250 GENERAL PHARMACY W/O HCPCS: Performed by: INTERNAL MEDICINE

## 2024-07-31 PROCEDURE — G0378 HOSPITAL OBSERVATION PER HR: HCPCS

## 2024-07-31 PROCEDURE — 36415 COLL VENOUS BLD VENIPUNCTURE: CPT | Performed by: EMERGENCY MEDICINE

## 2024-07-31 PROCEDURE — 96372 THER/PROPH/DIAG INJ SC/IM: CPT | Performed by: NURSE PRACTITIONER

## 2024-07-31 PROCEDURE — 93306 TTE W/DOPPLER COMPLETE: CPT | Performed by: INTERNAL MEDICINE

## 2024-07-31 PROCEDURE — 2500000004 HC RX 250 GENERAL PHARMACY W/ HCPCS (ALT 636 FOR OP/ED): Performed by: EMERGENCY MEDICINE

## 2024-07-31 PROCEDURE — 84484 ASSAY OF TROPONIN QUANT: CPT | Performed by: EMERGENCY MEDICINE

## 2024-07-31 PROCEDURE — 93306 TTE W/DOPPLER COMPLETE: CPT

## 2024-07-31 RX ORDER — ACETAMINOPHEN 160 MG/5ML
650 SOLUTION ORAL EVERY 4 HOURS PRN
Status: DISCONTINUED | OUTPATIENT
Start: 2024-07-31 | End: 2024-08-01 | Stop reason: HOSPADM

## 2024-07-31 RX ORDER — PANTOPRAZOLE SODIUM 40 MG/1
40 TABLET, DELAYED RELEASE ORAL
Status: DISCONTINUED | OUTPATIENT
Start: 2024-07-31 | End: 2024-08-01 | Stop reason: HOSPADM

## 2024-07-31 RX ORDER — TAMSULOSIN HYDROCHLORIDE 0.4 MG/1
0.4 CAPSULE ORAL DAILY
Status: DISCONTINUED | OUTPATIENT
Start: 2024-07-31 | End: 2024-08-01 | Stop reason: HOSPADM

## 2024-07-31 RX ORDER — HYDROXYZINE HYDROCHLORIDE 25 MG/1
25 TABLET, FILM COATED ORAL
Status: DISCONTINUED | OUTPATIENT
Start: 2024-07-31 | End: 2024-08-01 | Stop reason: HOSPADM

## 2024-07-31 RX ORDER — NAPROXEN SODIUM 220 MG/1
81 TABLET, FILM COATED ORAL DAILY
Status: DISCONTINUED | OUTPATIENT
Start: 2024-07-31 | End: 2024-08-01 | Stop reason: HOSPADM

## 2024-07-31 RX ORDER — SUCRALFATE 1 G/10ML
1 SUSPENSION ORAL EVERY 6 HOURS SCHEDULED
Status: DISCONTINUED | OUTPATIENT
Start: 2024-07-31 | End: 2024-08-01 | Stop reason: HOSPADM

## 2024-07-31 RX ORDER — LOSARTAN POTASSIUM 50 MG/1
50 TABLET ORAL DAILY
Status: DISCONTINUED | OUTPATIENT
Start: 2024-07-31 | End: 2024-08-01 | Stop reason: HOSPADM

## 2024-07-31 RX ORDER — ENOXAPARIN SODIUM 100 MG/ML
40 INJECTION SUBCUTANEOUS EVERY 24 HOURS
Status: DISCONTINUED | OUTPATIENT
Start: 2024-07-31 | End: 2024-08-01 | Stop reason: HOSPADM

## 2024-07-31 RX ORDER — POLYETHYLENE GLYCOL 3350 17 G/17G
17 POWDER, FOR SOLUTION ORAL DAILY
Status: DISCONTINUED | OUTPATIENT
Start: 2024-07-31 | End: 2024-08-01 | Stop reason: HOSPADM

## 2024-07-31 RX ORDER — PREDNISONE 20 MG/1
10 TABLET ORAL DAILY
Status: DISCONTINUED | OUTPATIENT
Start: 2024-08-07 | End: 2024-08-01 | Stop reason: HOSPADM

## 2024-07-31 RX ORDER — ACETAMINOPHEN 325 MG/1
650 TABLET ORAL EVERY 4 HOURS PRN
Status: DISCONTINUED | OUTPATIENT
Start: 2024-07-31 | End: 2024-08-01 | Stop reason: HOSPADM

## 2024-07-31 RX ORDER — SODIUM CHLORIDE 9 MG/ML
10 INJECTION, SOLUTION INTRAVENOUS CONTINUOUS PRN
Status: DISCONTINUED | OUTPATIENT
Start: 2024-07-31 | End: 2024-08-01 | Stop reason: HOSPADM

## 2024-07-31 RX ORDER — PREDNISONE 20 MG/1
30 TABLET ORAL DAILY
Status: DISCONTINUED | OUTPATIENT
Start: 2024-08-01 | End: 2024-08-01 | Stop reason: HOSPADM

## 2024-07-31 RX ORDER — PREDNISONE 20 MG/1
20 TABLET ORAL DAILY
Status: DISCONTINUED | OUTPATIENT
Start: 2024-08-04 | End: 2024-08-01 | Stop reason: HOSPADM

## 2024-07-31 RX ORDER — PREDNISONE 20 MG/1
40 TABLET ORAL DAILY
Status: COMPLETED | OUTPATIENT
Start: 2024-07-31 | End: 2024-07-31

## 2024-07-31 RX ORDER — NAPROXEN SODIUM 220 MG/1
324 TABLET, FILM COATED ORAL ONCE
Status: COMPLETED | OUTPATIENT
Start: 2024-07-31 | End: 2024-07-31

## 2024-07-31 RX ORDER — OLANZAPINE 5 MG/1
5 TABLET ORAL NIGHTLY
Status: DISCONTINUED | OUTPATIENT
Start: 2024-07-31 | End: 2024-08-01 | Stop reason: HOSPADM

## 2024-07-31 SDOH — HEALTH STABILITY: MENTAL HEALTH: HOW OFTEN DO YOU HAVE 6 OR MORE DRINKS ON ONE OCCASION?: NEVER

## 2024-07-31 SDOH — SOCIAL STABILITY: SOCIAL NETWORK: ARE YOU MARRIED, WIDOWED, DIVORCED, SEPARATED, NEVER MARRIED, OR LIVING WITH A PARTNER?: PATIENT DECLINED

## 2024-07-31 SDOH — ECONOMIC STABILITY: INCOME INSECURITY: IN THE PAST 12 MONTHS, HAS THE ELECTRIC, GAS, OIL, OR WATER COMPANY THREATENED TO SHUT OFF SERVICE IN YOUR HOME?: NO

## 2024-07-31 SDOH — ECONOMIC STABILITY: FOOD INSECURITY: WITHIN THE PAST 12 MONTHS, THE FOOD YOU BOUGHT JUST DIDN'T LAST AND YOU DIDN'T HAVE MONEY TO GET MORE.: NEVER TRUE

## 2024-07-31 SDOH — SOCIAL STABILITY: SOCIAL NETWORK: HOW OFTEN DO YOU ATTENT MEETINGS OF THE CLUB OR ORGANIZATION YOU BELONG TO?: PATIENT DECLINED

## 2024-07-31 SDOH — SOCIAL STABILITY: SOCIAL INSECURITY: ABUSE: ADULT

## 2024-07-31 SDOH — HEALTH STABILITY: MENTAL HEALTH
STRESS IS WHEN SOMEONE FEELS TENSE, NERVOUS, ANXIOUS, OR CAN'T SLEEP AT NIGHT BECAUSE THEIR MIND IS TROUBLED. HOW STRESSED ARE YOU?: ONLY A LITTLE

## 2024-07-31 SDOH — SOCIAL STABILITY: SOCIAL INSECURITY: WITHIN THE LAST YEAR, HAVE YOU BEEN AFRAID OF YOUR PARTNER OR EX-PARTNER?: NO

## 2024-07-31 SDOH — SOCIAL STABILITY: SOCIAL INSECURITY: WITHIN THE LAST YEAR, HAVE YOU BEEN HUMILIATED OR EMOTIONALLY ABUSED IN OTHER WAYS BY YOUR PARTNER OR EX-PARTNER?: NO

## 2024-07-31 SDOH — ECONOMIC STABILITY: FOOD INSECURITY: WITHIN THE PAST 12 MONTHS, YOU WORRIED THAT YOUR FOOD WOULD RUN OUT BEFORE YOU GOT MONEY TO BUY MORE.: NEVER TRUE

## 2024-07-31 SDOH — SOCIAL STABILITY: SOCIAL NETWORK: IN A TYPICAL WEEK, HOW MANY TIMES DO YOU TALK ON THE PHONE WITH FAMILY, FRIENDS, OR NEIGHBORS?: PATIENT DECLINED

## 2024-07-31 SDOH — SOCIAL STABILITY: SOCIAL INSECURITY: DO YOU FEEL UNSAFE GOING BACK TO THE PLACE WHERE YOU ARE LIVING?: NO

## 2024-07-31 SDOH — SOCIAL STABILITY: SOCIAL INSECURITY
WITHIN THE LAST YEAR, HAVE TO BEEN RAPED OR FORCED TO HAVE ANY KIND OF SEXUAL ACTIVITY BY YOUR PARTNER OR EX-PARTNER?: NO

## 2024-07-31 SDOH — SOCIAL STABILITY: SOCIAL INSECURITY: ARE YOU OR HAVE YOU BEEN THREATENED OR ABUSED PHYSICALLY, EMOTIONALLY, OR SEXUALLY BY ANYONE?: NO

## 2024-07-31 SDOH — SOCIAL STABILITY: SOCIAL INSECURITY: DO YOU FEEL ANYONE HAS EXPLOITED OR TAKEN ADVANTAGE OF YOU FINANCIALLY OR OF YOUR PERSONAL PROPERTY?: NO

## 2024-07-31 SDOH — SOCIAL STABILITY: SOCIAL NETWORK: HOW OFTEN DO YOU GET TOGETHER WITH FRIENDS OR RELATIVES?: PATIENT DECLINED

## 2024-07-31 SDOH — SOCIAL STABILITY: SOCIAL NETWORK
DO YOU BELONG TO ANY CLUBS OR ORGANIZATIONS SUCH AS CHURCH GROUPS UNIONS, FRATERNAL OR ATHLETIC GROUPS, OR SCHOOL GROUPS?: PATIENT DECLINED

## 2024-07-31 SDOH — SOCIAL STABILITY: SOCIAL INSECURITY
WITHIN THE LAST YEAR, HAVE YOU BEEN KICKED, HIT, SLAPPED, OR OTHERWISE PHYSICALLY HURT BY YOUR PARTNER OR EX-PARTNER?: NO

## 2024-07-31 SDOH — HEALTH STABILITY: MENTAL HEALTH
HOW OFTEN DO YOU NEED TO HAVE SOMEONE HELP YOU WHEN YOU READ INSTRUCTIONS, PAMPHLETS, OR OTHER WRITTEN MATERIAL FROM YOUR DOCTOR OR PHARMACY?: SOMETIMES

## 2024-07-31 SDOH — SOCIAL STABILITY: SOCIAL INSECURITY: DOES ANYONE TRY TO KEEP YOU FROM HAVING/CONTACTING OTHER FRIENDS OR DOING THINGS OUTSIDE YOUR HOME?: NO

## 2024-07-31 SDOH — HEALTH STABILITY: PHYSICAL HEALTH: ON AVERAGE, HOW MANY MINUTES DO YOU ENGAGE IN EXERCISE AT THIS LEVEL?: 0 MIN

## 2024-07-31 SDOH — SOCIAL STABILITY: SOCIAL INSECURITY: HAVE YOU HAD ANY THOUGHTS OF HARMING ANYONE ELSE?: NO

## 2024-07-31 SDOH — SOCIAL STABILITY: SOCIAL INSECURITY: HAVE YOU HAD THOUGHTS OF HARMING ANYONE ELSE?: NO

## 2024-07-31 SDOH — SOCIAL STABILITY: SOCIAL NETWORK: HOW OFTEN DO YOU ATTEND CHURCH OR RELIGIOUS SERVICES?: PATIENT DECLINED

## 2024-07-31 SDOH — SOCIAL STABILITY: SOCIAL INSECURITY: ARE THERE ANY APPARENT SIGNS OF INJURIES/BEHAVIORS THAT COULD BE RELATED TO ABUSE/NEGLECT?: NO

## 2024-07-31 SDOH — HEALTH STABILITY: PHYSICAL HEALTH: ON AVERAGE, HOW MANY DAYS PER WEEK DO YOU ENGAGE IN MODERATE TO STRENUOUS EXERCISE (LIKE A BRISK WALK)?: 0 DAYS

## 2024-07-31 SDOH — SOCIAL STABILITY: SOCIAL INSECURITY: HAS ANYONE EVER THREATENED TO HURT YOUR FAMILY OR YOUR PETS?: NO

## 2024-07-31 ASSESSMENT — COGNITIVE AND FUNCTIONAL STATUS - GENERAL
DAILY ACTIVITIY SCORE: 17
WALKING IN HOSPITAL ROOM: A LITTLE
CLIMB 3 TO 5 STEPS WITH RAILING: A LOT
TURNING FROM BACK TO SIDE WHILE IN FLAT BAD: A LITTLE
DAILY ACTIVITIY SCORE: 17
MOVING FROM LYING ON BACK TO SITTING ON SIDE OF FLAT BED WITH BEDRAILS: A LITTLE
DRESSING REGULAR LOWER BODY CLOTHING: A LITTLE
MOVING TO AND FROM BED TO CHAIR: A LITTLE
PERSONAL GROOMING: A LOT
PERSONAL GROOMING: A LOT
DRESSING REGULAR UPPER BODY CLOTHING: A LITTLE
DRESSING REGULAR UPPER BODY CLOTHING: A LITTLE
CLIMB 3 TO 5 STEPS WITH RAILING: A LOT
STANDING UP FROM CHAIR USING ARMS: A LITTLE
HELP NEEDED FOR BATHING: A LITTLE
MOVING TO AND FROM BED TO CHAIR: A LITTLE
MOVING FROM LYING ON BACK TO SITTING ON SIDE OF FLAT BED WITH BEDRAILS: A LITTLE
TOILETING: A LOT
DRESSING REGULAR LOWER BODY CLOTHING: A LITTLE
PATIENT BASELINE BEDBOUND: NO
TURNING FROM BACK TO SIDE WHILE IN FLAT BAD: A LITTLE
MOBILITY SCORE: 17
STANDING UP FROM CHAIR USING ARMS: A LITTLE
TOILETING: A LOT
HELP NEEDED FOR BATHING: A LITTLE
WALKING IN HOSPITAL ROOM: A LITTLE
MOBILITY SCORE: 17

## 2024-07-31 ASSESSMENT — ACTIVITIES OF DAILY LIVING (ADL)
DRESSING YOURSELF: NEEDS ASSISTANCE
BATHING: NEEDS ASSISTANCE
LACK_OF_TRANSPORTATION: NO
WALKS IN HOME: NEEDS ASSISTANCE
HEARING - RIGHT EAR: FUNCTIONAL
TOILETING: NEEDS ASSISTANCE
HEARING - LEFT EAR: FUNCTIONAL
JUDGMENT_ADEQUATE_SAFELY_COMPLETE_DAILY_ACTIVITIES: NO
ADEQUATE_TO_COMPLETE_ADL: YES
GROOMING: NEEDS ASSISTANCE
FEEDING YOURSELF: NEEDS ASSISTANCE
PATIENT'S MEMORY ADEQUATE TO SAFELY COMPLETE DAILY ACTIVITIES?: NO
LACK_OF_TRANSPORTATION: NO
ASSISTIVE_DEVICE: DENTURES LOWER;DENTURES UPPER;CANE;EYEGLASSES

## 2024-07-31 ASSESSMENT — PATIENT HEALTH QUESTIONNAIRE - PHQ9
1. LITTLE INTEREST OR PLEASURE IN DOING THINGS: NOT AT ALL
SUM OF ALL RESPONSES TO PHQ9 QUESTIONS 1 & 2: 0
2. FEELING DOWN, DEPRESSED OR HOPELESS: NOT AT ALL

## 2024-07-31 ASSESSMENT — LIFESTYLE VARIABLES
PRESCIPTION_ABUSE_PAST_12_MONTHS: NO
SUBSTANCE_ABUSE_PAST_12_MONTHS: NO
AUDIT-C TOTAL SCORE: 0
AUDIT-C TOTAL SCORE: 0
HOW OFTEN DO YOU HAVE A DRINK CONTAINING ALCOHOL: NEVER
HOW OFTEN DO YOU HAVE 6 OR MORE DRINKS ON ONE OCCASION: NEVER
HOW MANY STANDARD DRINKS CONTAINING ALCOHOL DO YOU HAVE ON A TYPICAL DAY: PATIENT DOES NOT DRINK
SKIP TO QUESTIONS 9-10: 1

## 2024-07-31 ASSESSMENT — PAIN SCALES - GENERAL
PAINLEVEL_OUTOF10: 0 - NO PAIN
PAINLEVEL_OUTOF10: 0 - NO PAIN

## 2024-07-31 ASSESSMENT — ENCOUNTER SYMPTOMS
HEMATOLOGIC/LYMPHATIC NEGATIVE: 1
CARDIOVASCULAR NEGATIVE: 1
CONFUSION: 1
MUSCULOSKELETAL NEGATIVE: 1
RESPIRATORY NEGATIVE: 1
GASTROINTESTINAL NEGATIVE: 1
ENDOCRINE NEGATIVE: 1
ACTIVITY CHANGE: 1
EYES NEGATIVE: 1
ALLERGIC/IMMUNOLOGIC NEGATIVE: 1
NEUROLOGICAL NEGATIVE: 1

## 2024-07-31 ASSESSMENT — PAIN - FUNCTIONAL ASSESSMENT: PAIN_FUNCTIONAL_ASSESSMENT: 0-10
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
## 2024-07-31 NOTE — CARE PLAN
The patient's goals for the shift include pt will use call light for assist to prevent falls or injuries tonight    The clinical goals for the shift include patient will remain safe this shift    Patient using call light for assistance daughter at bedside  patient still anxious today with episodes of calling out

## 2024-07-31 NOTE — H&P
"History Of Present Illness  Gigi Carver \"Rui\" is a 79 y.o. male presenting with fall. Pt has intermittent confusion. HPI mainly obtained from EMR chart review. Pt was just discharged from this hospital yesterday to SNF after being treated for COPD exacerbation. Pt arrived to the SNF and was apparently confused. Pt was also noted to have an unwitnessed fall. Workup in the ED revealed a slightly elevated troponin at 51. His troponin was 30 during his last admission so patient was admitted for newly elevated troponin. Pt states he falls all the time. Pts only concern at this time is that his wallet and keys are left at the SNF.     ED VS: T36.5, HR 97, RR 18, BP 92/60, Sp02 100%2L     Imaging: CT chest- Advanced pulmonary emphysema.  Small pleural effusions and basilar atelectasis.  No pneumothorax.  Liver cirrhosis and abdominal ascites.    CT head- No evidence of acute intracranial hemorrhage or depressed calvarial  fracture. Cerebral atrophy and chronic periventricular white matter small  vessel ischemic change.    Labs: Glu 139, Na 141, K 4.4, Bun/creat 39/0.79, Mg 1.94, BNP 77, Trop 51, WBC 6.4, H/H 12.8/39.2, Plt 91      Past Medical History  Past Medical History:   Diagnosis Date    Personal history of other (healed) physical injury and trauma     History of head injury    Personal history of other diseases of the digestive system 10/07/2013    History of esophageal reflux       Surgical History  Past Surgical History:   Procedure Laterality Date    MR HEAD ANGIO WO IV CONTRAST  2/25/2015    MR HEAD ANGIO WO IV CONTRAST 2/25/2015 [facility] INPATIENT LEGACY    ROTATOR CUFF REPAIR  03/25/2013    Rotator Cuff Repair        Social History  He reports that he has been smoking cigarettes. He has never used smokeless tobacco. He reports that he does not currently use alcohol. No history on file for drug use.    Family History  No family history on file.     Allergies  Alprazolam, Ciprofloxacin, Clonazepam, " "Doxycycline, Famotidine, Nsaids (non-steroidal anti-inflammatory drug), Olanzapine, Quetiapine, Sertraline, Shellfish containing products, and Shrimp    Review of Systems   Constitutional:  Positive for activity change.   HENT: Negative.     Eyes: Negative.    Respiratory: Negative.     Cardiovascular: Negative.    Gastrointestinal: Negative.    Endocrine: Negative.    Genitourinary: Negative.    Musculoskeletal: Negative.    Skin: Negative.    Allergic/Immunologic: Negative.    Neurological: Negative.    Hematological: Negative.    Psychiatric/Behavioral:  Positive for confusion.         Physical Exam  Vitals reviewed.   HENT:      Head: Normocephalic and atraumatic.      Right Ear: External ear normal.      Left Ear: External ear normal.      Nose: Nose normal.      Mouth/Throat:      Pharynx: Oropharynx is clear.   Eyes:      Conjunctiva/sclera: Conjunctivae normal.   Cardiovascular:      Rate and Rhythm: Normal rate and regular rhythm.      Pulses: Normal pulses.      Heart sounds: Normal heart sounds.   Pulmonary:      Effort: Pulmonary effort is normal.      Breath sounds: Normal breath sounds.   Abdominal:      General: Bowel sounds are normal.      Palpations: Abdomen is soft.   Musculoskeletal:         General: Normal range of motion.      Cervical back: Normal range of motion and neck supple.   Skin:     Findings: Bruising present.   Neurological:      General: No focal deficit present.      Mental Status: He is alert and oriented to person, place, and time.      Comments: forgetful   Psychiatric:         Mood and Affect: Mood normal.         Behavior: Behavior normal.          Last Recorded Vitals  Blood pressure 137/86, pulse 84, temperature 36.6 °C (97.9 °F), temperature source Temporal, resp. rate 18, height 1.702 m (5' 7\"), weight 71.2 kg (156 lb 15.5 oz), SpO2 95%.    Relevant Results  Scheduled medications  aspirin, 81 mg, oral, Daily  [Held by provider] enoxaparin, 40 mg, subcutaneous, " q24h  hydrOXYzine HCL, 25 mg, oral, BID  losartan, 50 mg, oral, Daily  OLANZapine, 5 mg, oral, Nightly  pantoprazole, 40 mg, oral, Daily before breakfast  polyethylene glycol, 17 g, oral, Daily  [START ON 8/1/2024] predniSONE, 30 mg, oral, Daily   Followed by  [START ON 8/4/2024] predniSONE, 20 mg, oral, Daily   Followed by  [START ON 8/7/2024] predniSONE, 10 mg, oral, Daily  sucralfate, 1 g, oral, q6h WILL  tamsulosin, 0.4 mg, oral, Daily      Continuous medications  sodium chloride 0.9%, 10 mL/hr      PRN medications  PRN medications: acetaminophen **OR** acetaminophen, oxygen, sodium chloride 0.9%    Results for orders placed or performed during the hospital encounter of 07/30/24 (from the past 24 hour(s))   CBC and Auto Differential   Result Value Ref Range    WBC 6.4 4.4 - 11.3 x10*3/uL    nRBC 0.0 0.0 - 0.0 /100 WBCs    RBC 3.71 (L) 4.50 - 5.90 x10*6/uL    Hemoglobin 12.8 (L) 13.5 - 17.5 g/dL    Hematocrit 39.2 (L) 41.0 - 52.0 %     (H) 80 - 100 fL    MCH 34.5 (H) 26.0 - 34.0 pg    MCHC 32.7 32.0 - 36.0 g/dL    RDW 13.9 11.5 - 14.5 %    Platelets 91 (L) 150 - 450 x10*3/uL    Neutrophils % 84.3 40.0 - 80.0 %    Immature Granulocytes %, Automated 0.3 0.0 - 0.9 %    Lymphocytes % 6.4 13.0 - 44.0 %    Monocytes % 8.8 2.0 - 10.0 %    Eosinophils % 0.0 0.0 - 6.0 %    Basophils % 0.2 0.0 - 2.0 %    Neutrophils Absolute 5.38 1.60 - 5.50 x10*3/uL    Immature Granulocytes Absolute, Automated 0.02 0.00 - 0.50 x10*3/uL    Lymphocytes Absolute 0.41 (L) 0.80 - 3.00 x10*3/uL    Monocytes Absolute 0.56 0.05 - 0.80 x10*3/uL    Eosinophils Absolute 0.00 0.00 - 0.40 x10*3/uL    Basophils Absolute 0.01 0.00 - 0.10 x10*3/uL   Comprehensive metabolic panel   Result Value Ref Range    Glucose 139 (H) 74 - 99 mg/dL    Sodium 141 136 - 145 mmol/L    Potassium 4.4 3.5 - 5.3 mmol/L    Chloride 101 98 - 107 mmol/L    Bicarbonate 38 (H) 21 - 32 mmol/L    Anion Gap <7 (L) 10 - 20 mmol/L    Urea Nitrogen 29 (H) 6 - 23 mg/dL     Creatinine 0.79 0.50 - 1.30 mg/dL    eGFR 90 >60 mL/min/1.73m*2    Calcium 7.9 (L) 8.6 - 10.3 mg/dL    Albumin 3.2 (L) 3.4 - 5.0 g/dL    Alkaline Phosphatase 91 33 - 136 U/L    Total Protein 5.4 (L) 6.4 - 8.2 g/dL    AST 30 9 - 39 U/L    Bilirubin, Total 0.6 0.0 - 1.2 mg/dL    ALT 61 (H) 10 - 52 U/L   Troponin I, High Sensitivity   Result Value Ref Range    Troponin I, High Sensitivity 51 (HH) 0 - 20 ng/L   Magnesium   Result Value Ref Range    Magnesium 1.94 1.60 - 2.40 mg/dL   B-type natriuretic peptide   Result Value Ref Range    BNP 77 0 - 99 pg/mL   Urinalysis with Reflex Culture and Microscopic   Result Value Ref Range    Color, Urine Yellow Light-Yellow, Yellow, Dark-Yellow    Appearance, Urine Clear Clear    Specific Gravity, Urine 1.028 1.005 - 1.035    pH, Urine 6.0 5.0, 5.5, 6.0, 6.5, 7.0, 7.5, 8.0    Protein, Urine 50 (1+) (A) NEGATIVE, 10 (TRACE), 20 (TRACE) mg/dL    Glucose, Urine 150 (2+) (A) Normal mg/dL    Blood, Urine NEGATIVE NEGATIVE    Ketones, Urine NEGATIVE NEGATIVE mg/dL    Bilirubin, Urine NEGATIVE NEGATIVE    Urobilinogen, Urine Normal Normal mg/dL    Nitrite, Urine NEGATIVE NEGATIVE    Leukocyte Esterase, Urine 75 Delores/µL (A) NEGATIVE   Microscopic Only, Urine   Result Value Ref Range    WBC, Urine 1-5 1-5, NONE /HPF    RBC, Urine 1-2 NONE, 1-2, 3-5 /HPF    Squamous Epithelial Cells, Urine 1-9 (SPARSE) Reference range not established. /HPF    Mucus, Urine FEW Reference range not established. /LPF    Hyaline Casts, Urine 2+ (A) NONE /LPF    Mixed Cell Casts, Urine 1+ (A) NONE /LPF   Troponin I, High Sensitivity   Result Value Ref Range    Troponin I, High Sensitivity 60 (HH) 0 - 20 ng/L        Assessment/Plan   Principal Problem:    Elevated troponin I level  Active Problems:    Cirrhosis of liver without ascites (Multi)    Tobacco use disorder    Superior mesenteric artery stenosis (Multi)    Benign prostatic hyperplasia without lower urinary tract symptoms    Recurrent major depressive  disorder (CMS-HCC)    Dementia (Multi)    Anxiety    COPD (chronic obstructive pulmonary disease) (Multi)    Duodenitis    Recurrent falls    Benign essential HTN    Chronic hypoxic respiratory failure (Multi)    Thrombocytopenia (CMS-HCC)    #Recurrent falls   -CT head: No evidence of acute intracranial hemorrhage or depressed calvarial  fracture. Cerebral atrophy and chronic periventricular white matter small  vessel ischemic change.  -CT chest: Advanced pulmonary emphysema.  Small pleural effusions and basilar atelectasis.  No pneumothorax.  Liver cirrhosis and abdominal ascites.  -PT/OT evals    #Elevated troponins  #Essential HTN  -Trop 51 > 60  -BNP  -Echo pending  -BP stable  -Cardiac monitoring  -Cardiology consult, appreciate recs  -Continue ASA, losartan     #COPD, not in acute exacerbation  #Chronic hypoxic respiratory failure   #Tobacco use disorder   -CT chest: Advanced pulmonary emphysema.  Small pleural effusions and basilar atelectasis.  No pneumothorax. Liver cirrhosis and abdominal ascites.  -Supplemental oxygen to maintain an sp02 greater than 92%  -Stable on chronic 2L  -Continue prednisone taper from recent admission      #Liver cirrhosis   #Superior mesenteric stenosis   #Duodenitis  -Recent EGD showed duodenitis   -Continue PPI and carafate  -Continue ASA     #BPH  -Continue tamsulosin      #Thrombocytopenia  -Plt 91  -Hold lovenox      #Hx of concussion with brain injury   #Concern for dementia  #Anxiety   #Depression  -Safety precautions  -PT/OT evals  -Continue hydroxyzine, olanzapine     DVT ppx  -ASA     PUD ppx  -pantoprazole     F: PRN  E: Replete per protocol  N: Regular   A: PIV     Disposition: Pt requires less than 2 inpatient days at this time   Code Status: DNR/DNI       Rachael Gonzalez, MARCELO-CNP

## 2024-07-31 NOTE — CONSULTS
Cardiology Consult Note  Patient: Gigi Carver  Unit/Bed: 211/211-A  YOB: 1945    Admitting Diagnosis: Elevated troponin [R79.89]  Fall, initial encounter [W19.XXXA]  Altered mental status, unspecified altered mental status type [R41.82]  Date:  7/30/2024  Hospital Day: 0  Attending: Cardiology consulting at the request of  Kesha Swanson MD  for elevated troponin      Chief Complaint   Patient presents with    Fall     [facility]. EMS reports pt had an unwitnessed fall. Pt denies hitting head. Denies pain. Aox2-3. On 2L NC on arrival.            History of Present Illness:   Gigi Carver is a 79 y.o. Male who presented via [facility] from skilled nursing facility after an unwitnessed fall with AMS       Admitted 7/15/2024 with abdominal pain.  There was concern for mesenteric ischemia causing his symptoms, after incidental findings of superior mesenteric stenosis with patent celiac artery and inferior mesenteric artery,  ultimately deemed low likelihood with suggestion for outpatient follow up.  He left AMA, returned to the ER 7/23/2024 and Discharged 7/30/2024 after 5 day hospitalization for hypoxemic respiratory failure due to COPD exacerbation,  severe smoking induced emphysema,  returning to the ER from skilled nursing facility after the fall       SUBJECTIVE:   Currently, he is awake in bed and although confused,  denies any chest discomfort or unusual dyspnea .  History is obtained from chart review        Troponin returned elevate at 51--60  Recent testing 7/15 and 7/23 returned at 53--43--39--31           Past medical history significant for  HTN, GERD,  cirrhosis with varices, stenosis of SMA, SATISH patent, celiac with mild disease   Primary Cardiology outpatient: NONE       Allergies:  Allergies   Allergen Reactions    Alprazolam Unknown    Ciprofloxacin Unknown    Clonazepam Unknown    Doxycycline Unknown    Famotidine Unknown    Nsaids (Non-Steroidal  Anti-Inflammatory Drug) Unknown    Olanzapine Unknown    Quetiapine Unknown    Sertraline Unknown    Shellfish Containing Products Unknown    Shrimp Unknown      Home Medication:  Medications Prior to Admission   Medication Sig Dispense Refill Last Dose    acetaminophen (Tylenol) 325 mg tablet Take 2 tablets (650 mg) by mouth every 6 hours if needed for mild pain (1 - 3).   Unknown    aspirin 81 mg EC tablet Take 1 tablet (81 mg) by mouth once daily as needed for mild pain (1 - 3).   Unknown    hydrOXYzine HCL (Atarax) 25 mg tablet Take 1 tablet (25 mg) by mouth 2 times daily (morning and late afternoon). 60 tablet 0 Unknown    losartan (Cozaar) 50 mg tablet Take 1 tablet (50 mg) by mouth once daily.   Unknown    OLANZapine (ZyPREXA) 5 mg tablet Take 1 tablet (5 mg) by mouth once daily at bedtime. 30 tablet 0 Unknown    pantoprazole (ProtoNix) 40 mg EC tablet Take 1 tablet (40 mg) by mouth once daily in the morning. Take before meals. Do not crush, chew, or split. 30 tablet 0 Unknown    predniSONE (Deltasone) 10 mg tablet Take 4 tablets (40 mg) by mouth once daily for 1 day, THEN 3 tablets (30 mg) once daily for 3 days, THEN 2 tablets (20 mg) once daily for 3 days, THEN 1 tablet (10 mg) once daily for 3 days.   Unknown    sucralfate (Carafate) 100 mg/mL suspension Take 10 mL (1 g) by mouth every 6 hours.   Unknown    tamsulosin (Flomax) 0.4 mg 24 hr capsule Take 1 capsule (0.4 mg) by mouth once daily. 30 capsule 0 Unknown      PMHx:  Past Medical History:   Diagnosis Date    Personal history of other (healed) physical injury and trauma     History of head injury    Personal history of other diseases of the digestive system 10/07/2013    History of esophageal reflux       PSHx:  Past Surgical History:   Procedure Laterality Date    MR HEAD ANGIO WO IV CONTRAST  2/25/2015    MR HEAD ANGIO WO IV CONTRAST 2/25/2015 [facility] INPATIENT LEGACY    ROTATOR CUFF REPAIR  03/25/2013    Rotator Cuff Repair       Social Hx:  Social  History     Socioeconomic History    Marital status:    Tobacco Use    Smoking status: Every Day     Current packs/day: 1.00     Types: Cigarettes    Smokeless tobacco: Never   Substance and Sexual Activity    Alcohol use: Not Currently     Social Determinants of Health     Financial Resource Strain: Medium Risk (7/31/2024)    Overall Financial Resource Strain (CARDIA)     Difficulty of Paying Living Expenses: Somewhat hard   Food Insecurity: No Food Insecurity (7/31/2024)    Hunger Vital Sign     Worried About Running Out of Food in the Last Year: Never true     Ran Out of Food in the Last Year: Never true   Transportation Needs: No Transportation Needs (7/31/2024)    PRAPARE - Transportation     Lack of Transportation (Medical): No     Lack of Transportation (Non-Medical): No   Physical Activity: Inactive (7/31/2024)    Exercise Vital Sign     Days of Exercise per Week: 0 days     Minutes of Exercise per Session: 0 min   Stress: No Stress Concern Present (7/31/2024)    [employer] [employer] - Occupational Stress Questionnaire     Feeling of Stress : Only a little   Social Connections: Patient Declined (7/31/2024)    Social Connection and Isolation Panel [NHANES]     Frequency of Communication with Friends and Family: Patient declined     Frequency of Social Gatherings with Friends and Family: Patient declined     Attends Christianity Services: Patient declined     Active Member of Clubs or Organizations: Patient declined     Attends Club or Organization Meetings: Patient declined     Marital Status: Patient declined   Intimate Partner Violence: Not At Risk (7/31/2024)    Humiliation, Afraid, Rape, and Kick questionnaire     Fear of Current or Ex-Partner: No     Emotionally Abused: No     Physically Abused: No     Sexually Abused: No   Housing Stability: Low Risk  (7/31/2024)    Housing Stability Vital Sign     Unable to Pay for Housing in the Last Year: No     Number of Times Moved in the  Last Year: 0     Homeless in the Last Year: No       Family Hx:  No family history on file.    Review of Systems:   12 system review of symptoms is negative except as noted above          OBJECTIVE  Vitals:   Visit Vitals  /86 (BP Location: Right arm, Patient Position: Lying)   Pulse 84   Temp 36.6 °C (97.9 °F) (Temporal)   Resp 18        Wt Readings from Last 5 Encounters:   07/31/24 71.2 kg (156 lb 15.5 oz)   07/23/24 69.7 kg (153 lb 10.6 oz)   07/16/24 68.5 kg (151 lb 1.6 oz)   07/15/24 65 kg (143 lb 4.8 oz)   07/12/24 74.8 kg (165 lb)       Intake / Output:   I/O last 3 completed shifts:  In: 982.4 (13.8 mL/kg) [IV Piggyback:982.4]  Out: - (0 mL/kg)   Weight: 71.2 kg      Tele monitoring: NSR     Physical Examination:    Constitutional:       Appearance: Not in distress. Chronically ill-appearing.   Eyes:      Conjunctiva/sclera: Conjunctivae normal.   Neck:      Vascular: JVD normal.   Pulmonary:      Effort: Pulmonary effort is normal.      Breath sounds: Decreased breath sounds present.   Cardiovascular:      PMI at left midclavicular line. Normal rate. Regular rhythm. Normal S1. Normal S2.       Murmurs: There is no murmur.      No rub.   Pulses:     Intact distal pulses.   Edema:     Peripheral edema absent.   Abdominal:      General: Bowel sounds are normal.   Musculoskeletal:      Cervical back: Neck supple. Skin:     General: Skin is warm and dry.   Neurological:      Mental Status: Alert. Exhibits altered mental status.   Psychiatric:         Cognition and Memory: Cognition is impaired.            LABS:  CMP:   Recent Labs     07/30/24  2055 07/30/24  0749 07/29/24  0943 07/28/24  0712 07/27/24  0642 07/26/24  0729 07/25/24  0511 07/24/24  0509 07/23/24  1236 07/20/24  0700 07/19/24  0816 07/17/24  0649 07/16/24  0626 07/15/24  1604    142 143 141 144 140 141 141 140 139 136   < > 138 139   K 4.4 3.6 4.2 4.0 4.0 4.1 4.5 4.4 3.7 4.2 4.1   < > 3.5 3.5    99 102 99 100 102 102 104 102 99  "99   < > 97* 96*   CO2 38* 42* 39* 36* 39* 31 39* 34* 30 34* 31   < > 34* 33*   ANIONGAP <7* <7* <7* 10 9* 11 <7* 7* 12 10 10   < > 11 14   BUN 29* 23 27* 25* 26* 27* 26* 15 19 9 8   < > 17 19   CREATININE 0.79 0.58 0.61 0.69 0.67 0.65 0.75 0.71 0.85 0.82 0.97   < > 0.79 0.89   EGFR 90 >90 >90 >90 >90 >90 >90 >90 88 89 79   < > 90 87   MG 1.94  --   --   --   --   --   --   --   --   --  1.38*  --  1.65 1.49*    < > = values in this interval not displayed.     LIVER ENZYMES:   Recent Labs     07/30/24 2055 07/24/24  0509 07/23/24  1236 07/20/24  0700 07/19/24  0816 07/18/24  0707 07/16/24  0626 07/15/24  1604 07/12/24  1537   ALBUMIN 3.2*  --  3.5 3.1* 3.2* 2.8* 3.3* 3.5 4.0   ALT 61*  --  17 15  --  14 13 13 15   AST 30  --  23 24  --  26 17 18 26   BILITOT 0.6  --  0.8 1.0  --  1.0 0.9 1.4* 1.6*   LIPASE  --  16  --   --   --   --   --   --  11     CBC:  Recent Labs     07/30/24 2055 07/30/24  0748 07/29/24  0943 07/28/24  0712 07/27/24  0642 07/26/24  0729 07/25/24  0511 07/24/24  0509   WBC 6.4 4.9 3.8* 3.5* 3.5* 3.3* 5.2 2.2*   HGB 12.8* 12.6* 12.1* 12.9* 13.8 12.0* 13.6 13.3*   HCT 39.2* 38.8* 37.4* 38.8* 42.0 36.8* 41.0 40.1*   PLT 91* 90* 89* 96* 105* 92* 110* 85*   * 105* 106* 102* 104* 105* 104* 106*     COAG:   Recent Labs     07/16/24  1142   INR 1.1     ABO: No results for input(s): \"ABO\" in the last 23279 hours.  HEME/ENDO:No results for input(s): \"FERRITIN\", \"IRONSAT\", \"TSH\", \"HGBA1C\" in the last 26445 hours.   CARDIAC:   Recent Labs     07/31/24  0035 07/30/24  2055 07/23/24  1458 07/23/24  1246 07/23/24  1236 07/15/24  1721 07/15/24  1604   TROPHS 60* 51* 31*  --  39* 43* 53*   BNP  --  77  --  200*  --   --  229*       Lipid Panel:  No results found for: \"HDL\", \"CHHDL\", \"VLDL\", \"TRIG\", \"NHDL\"       Current Medications:   MEDICATIONS  Infusions:  sodium chloride 0.9%      Scheduled:  aspirin, 81 mg, Daily  [Held by provider] enoxaparin, 40 mg, q24h  hydrOXYzine HCL, 25 mg, BID  losartan, 50 " mg, Daily  OLANZapine, 5 mg, Nightly  pantoprazole, 40 mg, Daily before breakfast  polyethylene glycol, 17 g, Daily  [START ON 8/1/2024] predniSONE, 30 mg, Daily   Followed by  [START ON 8/4/2024] predniSONE, 20 mg, Daily   Followed by  [START ON 8/7/2024] predniSONE, 10 mg, Daily  sucralfate, 1 g, q6h WILL  tamsulosin, 0.4 mg, Daily      PRN:  acetaminophen, 650 mg, q4h PRN   Or  acetaminophen, 650 mg, q4h PRN  oxygen, , Continuous PRN - O2/gases  sodium chloride 0.9%, 10 mL/hr, Continuous PRN          LAST IMAGING RESULTS:     Echocardiogram 7/31/2024  CONCLUSIONS:   1. Left ventricular ejection fraction is normal, by visual estimate at 55-60%.   2. Spectral Doppler shows an impaired relaxation pattern of left ventricular diastolic filling.   3. Mildly enlarged right ventricle.   4. There is normal right ventricular global systolic function.   5. There is a small pericardial effusion    CT chest wo IV contrast  7/30/2024  Impression:   Advanced pulmonary emphysema.  Small pleural effusions and basilar atelectasis.  No pneumothorax.  Liver cirrhosis and abdominal ascites.        CT head wo IV contrast 7/30/2024     Impression:   No evidence of acute intracranial hemorrhage or depressed calvarial  fracture.  Cerebral atrophy and chronic periventricular white matter small  vessel ischemic change.       XR chest 1 view 7/30/2024   Impression:   Findings suggestive of chronic obstructive lung disease and pulmonary  arterial hypertension.  Left pleural effusion. Basilar atelectasis.        MRI Brain 7/18/2024  IMPRESSION:  No evidence for acute infarct or acute intracranial pathology.  Moderate-severe degree of nonspecific white matter changes, most  consistent with chronic small-vessel ischemic disease.        Vascular US mesenteric artery duplex 7/16/2024  CONCLUSIONS:  Mesenteric: SMA demonstrates a hemodynamically significant stenosis of greater than 70%. Technically difficult and limited exam due to patient movement  and inability to cooperate. May wish other means of evaluation.      US Liver with doppler   IMPRESSION:  Cirrhotic liver  The paraumbilical vein is recanalized from portal hypertension. No  splenomegaly from portal hypertension. No ascites in the right upper  quadrant on today's ultrasound. No ascites anywhere in the abdomen or  pelvis on the recent CT 12 July 2024  There was no acute thrombosis anywhere in the portal venous system on  recent CT with contrast  Today's ultrasound again confirms entire portal venous system  including the splenic vein, main and intrahepatic portal veins is all  patent with appropriate direction of flow; still no acute thrombosis  in the portal venous system  Today's ultrasound also confirms no bidirectional or reversed flow in  the portal venous system  All three hepatic veins are patent with somewhat dampened (biphasic  rather than triphasic) waveforms; no acute hepatic venous thrombosis  Gallbladder borderline for dilation but no shadowing gallstone  3-4 mm echogenic gallbladder filling defect without posterior  acoustic shadowing could be an echogenic sludge ball or small polyp           EKG dated 7/30/2024 independently reviewed   ,  RBBB, PVCs,  grossly unchanged from exam 7/23/2024        Assessment:   79 YOM with non-MI troponin elevation,  multifactorial,  appears chronic without chest discomfort or EKG changes.  Preserved LV systolic function.  Small pericardial effusion, asymptomatic     HTN, controlled   cirrhosis with varices  Stenosis of SMA, SATISH patent, celiac with mild disease without current mesenteric ischemia   COPD / emphysema       Plan:  ACS ruled out, follow troponin until down trend   BP is controlled on current Rx,  continue home antihypertensive regimen  No further cardiac testing for now                  Thank you  for the consult   Will sign off   Please call or message with questions, concerns or changes in clinical status   The case was discussed  with TUAN Guajardo  and The bedside RN           Electronically signed by TUAN Zaman on 7/31/2024 at 10:12 AM

## 2024-07-31 NOTE — ED PROVIDER NOTES
HPI   Chief Complaint   Patient presents with    Fall     Pt here from [facility] via EMS. EMS reports pt had an unwitnessed fall. Pt denies hitting head. Denies pain. Aox2-3. On 2L NC on arrival.       HPI  Patient is an 79-year-old male that was just discharged from the hospital this morning to [facility] after being managed for COPD exacerbation and liver cirrhosis, sent to the ED again for unwitnessed fall and altered mental status.  Per facility, patient has appeared confused since arriving at the facility.  He was unable to tell them who his POA was.  He also apparently had an unwitnessed fall today, so they sent him back to the ED for further evaluation.  Patient himself currently has no complaints.  He notes that he falls frequently.  He is alert and oriented to self, place, month, and year.  However, he is unable to recall the events of today and why he is here in the ED right now.  He otherwise denies any chest pain, shortness of breath, abdominal pain, vomiting, or changes in bowel or bladder habits.      Patient History   Past Medical History:   Diagnosis Date    Personal history of other (healed) physical injury and trauma     History of head injury    Personal history of other diseases of the digestive system 10/07/2013    History of esophageal reflux     Past Surgical History:   Procedure Laterality Date    MR HEAD ANGIO WO IV CONTRAST  2/25/2015    MR HEAD ANGIO WO IV CONTRAST 2/25/2015 [facility] INPATIENT LEGACY    ROTATOR CUFF REPAIR  03/25/2013    Rotator Cuff Repair     No family history on file.  Social History     Tobacco Use    Smoking status: Every Day     Current packs/day: 1.00     Types: Cigarettes    Smokeless tobacco: Never   Substance Use Topics    Alcohol use: Not on file    Drug use: Not on file       Physical Exam   ED Triage Vitals   Temperature Heart Rate Respirations BP   07/30/24 2019 07/30/24 2019 07/30/24 2019 07/30/24 2019   36.5 °C (97.7 °F) 97 18 92/60      Pulse Ox  Temp Source Heart Rate Source Patient Position   07/31/24 0035 07/30/24 2019 07/30/24 2019 --   100 % Temporal Monitor       BP Location FiO2 (%)     -- 07/30/24 2019      28 %       Physical Exam  Vitals and nursing note reviewed.   Constitutional:       General: He is not in acute distress.     Appearance: He is not toxic-appearing.   HENT:      Head: Normocephalic.      Mouth/Throat:      Mouth: Mucous membranes are moist.   Eyes:      Extraocular Movements: Extraocular movements intact.      Conjunctiva/sclera: Conjunctivae normal.   Cardiovascular:      Rate and Rhythm: Normal rate and regular rhythm.   Pulmonary:      Effort: Pulmonary effort is normal. No respiratory distress.      Breath sounds: Normal breath sounds. No wheezing.      Comments: Currently on 2L nasal cannula  Abdominal:      Palpations: Abdomen is soft.   Musculoskeletal:         General: No swelling.      Cervical back: Neck supple.   Skin:     General: Skin is warm and dry.      Capillary Refill: Capillary refill takes less than 2 seconds.   Neurological:      General: No focal deficit present.      Mental Status: He is alert and oriented to person, place, and time. Mental status is at baseline.           ED Course & MDM   ED Course as of 07/31/24 0159   Tue Jul 30, 2024   2100 EKG obtained at 2057, interpreted by myself.  Normal sinus rhythm with a ventricular rate of 100, no axis deviation, right bundle branch block present, QRS of 132, otherwise normal intervals with no acute ischemic changes [VT]      ED Course User Index  [VT] Brenda MALONEY MD         Diagnoses as of 07/31/24 0159   Fall, initial encounter   Altered mental status, unspecified altered mental status type   Elevated troponin                       [place] Coma Scale Score: 14                        Medical Decision Making  Patient was seen and evaluated for altered mental status with an unwitnessed fall at his nursing facility.  On arrival, patient is slightly hypotensive  with systolic blood pressure in the 90s.  However, it appears that patient was discharged with a similar blood pressure earlier this afternoon.  Patient is placed on a cardiac monitor with continuous pulse ox.  Peripheral IV is established and patient is started on a 1 L bolus of normal saline.  Additional lab work and imaging ordered for further evaluation of the patient's symptoms.  CBC is unremarkable.  CMP is unremarkable.  Magnesium is normal at 1.9.  Urinalysis is not indicative of infection, with 1-5 WBCs and 1-9 epithelial cells.  High-sensitivity troponin is elevated at 51, with repeat still elevated at 60.  Previous high-sensitivity troponin from approximately 1 week ago was 30.  Given new elevated troponin, patient is administered aspirin 324 mg.  CT chest wo IV contrast   Final Result   Advanced pulmonary emphysema.        Small pleural effusions and basilar atelectasis.        No pneumothorax.        Liver cirrhosis and abdominal ascites.        MACRO:   None        Signed by: Mp Dobbins 7/30/2024 10:37 PM   Dictation workstation:   [id number]      CT head wo IV contrast   Final Result   No evidence of acute intracranial hemorrhage or depressed calvarial   fracture.        Cerebral atrophy and chronic periventricular white matter small   vessel ischemic change.        MACRO:   None        Signed by: Mp Dobbins 7/30/2024 10:28 PM   Dictation workstation:   [id number]        Patient was informed of their lab and imaging results, and all questions and concerns were answered. Given his elevated troponin, admission planning for further management was discussed at this time, to which the patient was agreeable.  I discussed the case with Candie Francisco, [facility] on-call for the hospitalist service, who accepts patient for admission under Dr. Velasquez.    Procedure  Procedures     Brenda MALONEY MD  07/31/24 0223

## 2024-07-31 NOTE — CARE PLAN
The patient's goals for the shift include pt will use call light for assist to prevent falls or injuries tonight    The clinical goals for the shift include pt will not experience fall or injury    PT HAS BEEN AWAKE ALL NIGHT SINCE ADMISSION. HAS BEEN SITTING IN BED WATCHING tv. HAS USED CALL LIGHT TO [facility] FOR TOILETINGF AND REQUESTING ICE WATER. BED ALAERM IS ACTIVATED DUE TO HIGH RISK FOR FALL. NO ADDITIONAL FALL OR INJURY HAS BEEN NOTED THIS SHIFT.

## 2024-07-31 NOTE — PROGRESS NOTES
07/31/24 0938   Discharge Planning   Assistance Needed Patient says he doesn't have DME; Has neighbors that assist as well as [employer] 3x/week;  Can drive per patient;  was DC to rehab yesterday 7/30.   Type of Residence Private residence  (1 story house no basement)   Do you have animals or pets at home? No   Home or Post Acute Services Post acute facilities (Rehab/SNF/etc)   Type of Post Acute Facility Services Skilled nursing   Expected Discharge Disposition SNF   Does the patient need discharge transport arranged? Yes   RoundTrip coordination needed? Yes   Has discharge transport been arranged? No     Patient was discharged to [facility] SNF yesterday.  7/30  He fell and was sent to be looked over.  Spoke with Pamela Smart-daughter, and she is aware.  She wants him to return to SNF when medically cleared.  Referral sent thru [facility].  TCC following    12:00 PM  Pippa from [facility] did a patient bedside assessment.  Returning to [facility] pending confirmation of acceptance.  TCC following.     3:25 pm  [facility] has declined taking patient back to their facility.  They feel he is an elopement risk. Called daughter and she doesn't know what to do- she said he could either go to a SNF or home.  She can't stay in town any longer and has to head home.  She will  patient's wallet and keys and bring to him to the hospital.  TCC will send referral to [facility] and [facility].

## 2024-08-01 ENCOUNTER — APPOINTMENT (OUTPATIENT)
Dept: RADIOLOGY | Facility: HOSPITAL | Age: 79
End: 2024-08-01
Payer: MEDICARE

## 2024-08-01 VITALS
TEMPERATURE: 98.1 F | RESPIRATION RATE: 18 BRPM | HEART RATE: 88 BPM | SYSTOLIC BLOOD PRESSURE: 117 MMHG | OXYGEN SATURATION: 99 % | HEIGHT: 67 IN | BODY MASS INDEX: 24.64 KG/M2 | WEIGHT: 156.97 LBS | DIASTOLIC BLOOD PRESSURE: 62 MMHG

## 2024-08-01 LAB
BACTERIA UR CULT: NORMAL
LABORATORY COMMENT REPORT: NORMAL
PATH REPORT.FINAL DX SPEC: NORMAL
PATH REPORT.GROSS SPEC: NORMAL
PATH REPORT.RELEVANT HX SPEC: NORMAL
PATH REPORT.TOTAL CANCER: NORMAL

## 2024-08-01 PROCEDURE — 2500000004 HC RX 250 GENERAL PHARMACY W/ HCPCS (ALT 636 FOR OP/ED): Performed by: NURSE PRACTITIONER

## 2024-08-01 PROCEDURE — 74018 RADEX ABDOMEN 1 VIEW: CPT

## 2024-08-01 PROCEDURE — 2500000002 HC RX 250 W HCPCS SELF ADMINISTERED DRUGS (ALT 637 FOR MEDICARE OP, ALT 636 FOR OP/ED): Performed by: NURSE PRACTITIONER

## 2024-08-01 PROCEDURE — 94762 N-INVAS EAR/PLS OXIMTRY CONT: CPT

## 2024-08-01 PROCEDURE — 2500000005 HC RX 250 GENERAL PHARMACY W/O HCPCS: Performed by: INTERNAL MEDICINE

## 2024-08-01 PROCEDURE — 94760 N-INVAS EAR/PLS OXIMETRY 1: CPT

## 2024-08-01 PROCEDURE — 96374 THER/PROPH/DIAG INJ IV PUSH: CPT

## 2024-08-01 PROCEDURE — 74018 RADEX ABDOMEN 1 VIEW: CPT | Performed by: RADIOLOGY

## 2024-08-01 PROCEDURE — 2500000004 HC RX 250 GENERAL PHARMACY W/ HCPCS (ALT 636 FOR OP/ED)

## 2024-08-01 PROCEDURE — 2500000001 HC RX 250 WO HCPCS SELF ADMINISTERED DRUGS (ALT 637 FOR MEDICARE OP): Performed by: NURSE PRACTITIONER

## 2024-08-01 PROCEDURE — 2500000001 HC RX 250 WO HCPCS SELF ADMINISTERED DRUGS (ALT 637 FOR MEDICARE OP)

## 2024-08-01 PROCEDURE — G0378 HOSPITAL OBSERVATION PER HR: HCPCS

## 2024-08-01 RX ORDER — HYDROXYZINE HYDROCHLORIDE 25 MG/1
25 TABLET, FILM COATED ORAL EVERY 6 HOURS PRN
Status: DISCONTINUED | OUTPATIENT
Start: 2024-08-01 | End: 2024-08-01 | Stop reason: HOSPADM

## 2024-08-01 RX ORDER — MORPHINE SULFATE 2 MG/ML
1 INJECTION, SOLUTION INTRAMUSCULAR; INTRAVENOUS ONCE
Status: COMPLETED | OUTPATIENT
Start: 2024-08-01 | End: 2024-08-01

## 2024-08-01 ASSESSMENT — COGNITIVE AND FUNCTIONAL STATUS - GENERAL
DRESSING REGULAR UPPER BODY CLOTHING: A LITTLE
PERSONAL GROOMING: A LITTLE
MOVING FROM LYING ON BACK TO SITTING ON SIDE OF FLAT BED WITH BEDRAILS: A LITTLE
TOILETING: A LITTLE
MOVING TO AND FROM BED TO CHAIR: A LITTLE
CLIMB 3 TO 5 STEPS WITH RAILING: A LITTLE
STANDING UP FROM CHAIR USING ARMS: A LITTLE
DAILY ACTIVITIY SCORE: 18
TURNING FROM BACK TO SIDE WHILE IN FLAT BAD: A LITTLE
WALKING IN HOSPITAL ROOM: A LITTLE
MOBILITY SCORE: 18
HELP NEEDED FOR BATHING: A LITTLE
EATING MEALS: A LITTLE
DRESSING REGULAR LOWER BODY CLOTHING: A LITTLE

## 2024-08-01 ASSESSMENT — PAIN SCALES - GENERAL: PAINLEVEL_OUTOF10: 0 - NO PAIN

## 2024-08-01 NOTE — DISCHARGE SUMMARY
"Discharge Diagnosis  Elevated troponin I level    Issues Requiring Follow-Up  Follow up with PCP     Discharge Meds     Your medication list        CONTINUE taking these medications        Instructions Last Dose Given Next Dose Due   acetaminophen 325 mg tablet  Commonly known as: Tylenol           aspirin 81 mg EC tablet           hydrOXYzine HCL 25 mg tablet  Commonly known as: Atarax      Take 1 tablet (25 mg) by mouth 2 times daily (morning and late afternoon).       losartan 50 mg tablet  Commonly known as: Cozaar      Take 1 tablet (50 mg) by mouth once daily.       OLANZapine 5 mg tablet  Commonly known as: ZyPREXA      Take 1 tablet (5 mg) by mouth once daily at bedtime.       pantoprazole 40 mg EC tablet  Commonly known as: ProtoNix      Take 1 tablet (40 mg) by mouth once daily in the morning. Take before meals. Do not crush, chew, or split.       predniSONE 10 mg tablet  Commonly known as: Deltasone  Start taking on: July 31, 2024      Take 4 tablets (40 mg) by mouth once daily for 1 day, THEN 3 tablets (30 mg) once daily for 3 days, THEN 2 tablets (20 mg) once daily for 3 days, THEN 1 tablet (10 mg) once daily for 3 days.       sucralfate 100 mg/mL suspension  Commonly known as: Carafate      Take 10 mL (1 g) by mouth every 6 hours.       tamsulosin 0.4 mg 24 hr capsule  Commonly known as: Flomax      Take 1 capsule (0.4 mg) by mouth once daily.                Test Results Pending At Discharge  Pending Labs       Order Current Status    Extra Urine Gray Tube Collected (07/30/24 7409)    Urinalysis with Reflex Culture and Microscopic In process            Hospital Course   Gigi Carver \"Rui\" is a 79 y.o. male presenting with fall. Pt has intermittent confusion. HPI mainly obtained from EMR chart review. Pt was just discharged from this hospital yesterday to SNF after being treated for COPD exacerbation. Pt arrived to the SNF and was apparently confused. Pt was also noted to have an unwitnessed fall. " Workup in the ED revealed a slightly elevated troponin at 51. His troponin was 30 during his last admission so patient was admitted for newly elevated troponin. Pt states he falls all the time. Pts only concern at this time is that his wallet and keys are left at the SNF.      ED VS: T36.5, HR 97, RR 18, BP 92/60, Sp02 100%2L      Imaging: CT chest- Advanced pulmonary emphysema.  Small pleural effusions and basilar atelectasis.  No pneumothorax.  Liver cirrhosis and abdominal ascites.     CT head- No evidence of acute intracranial hemorrhage or depressed calvarial  fracture. Cerebral atrophy and chronic periventricular white matter small  vessel ischemic change.     Labs: Glu 139, Na 141, K 4.4, Bun/creat 39/0.79, Mg 1.94, BNP 77, Trop 51, WBC 6.4, H/H 12.8/39.2, Plt 91     Hospital Course:  #Recurrent falls   -CT head: No evidence of acute intracranial hemorrhage or depressed calvarial  fracture. Cerebral atrophy and chronic periventricular white matter small  vessel ischemic change.  -CT chest: Advanced pulmonary emphysema.  Small pleural effusions and basilar atelectasis.  No pneumothorax.  Liver cirrhosis and abdominal ascites.  -PT/OT evals     #Elevated troponins  #Essential HTN  -Trop 51 > 60  -BNP  -Echo pending  -BP stable  -Cardiac monitoring  -Cardiology consult, appreciate recs  -Continue ASA, losartan      #COPD, not in acute exacerbation  #Chronic hypoxic respiratory failure   #Tobacco use disorder   -CT chest: Advanced pulmonary emphysema.  Small pleural effusions and basilar atelectasis.  No pneumothorax. Liver cirrhosis and abdominal ascites.  -Supplemental oxygen to maintain an sp02 greater than 92%  -Stable on chronic 2L  -Continue prednisone taper from recent admission      #Liver cirrhosis   #Superior mesenteric stenosis   #Duodenitis  -Recent EGD showed duodenitis   -Continue PPI and carafate  -Continue ASA     #BPH  -Continue tamsulosin      #Thrombocytopenia  -Plt 91  -Hold lovenox      #Hx  of concussion with brain injury   #Concern for dementia  #Anxiety   #Depression  -Safety precautions  -PT/OT evals  -Continue hydroxyzine, olanzapine     DVT ppx  -ASA     PUD ppx  -pantoprazole     F: PRN  E: Replete per protocol  N: Regular   A: PIV     Disposition: Pt stable for discharge to SNF. Cardiology cleared,ruled out ACS.    Code Status: DNR/DNI    Total cumulative time spent in preparation of this discharge including documentation review, coordination of care with the medical team including PT/SW/care coordinators and treating consultants, discussion with patient and pertinent family members and finalization of prescriptions, followup appointments and this discharge summary was approximately  45 minutes. Over 50% of the time was spent face-to-face counseling the patient on diagnosis, prescriptions, and follow up appointments.     Pertinent Physical Exam At Time of Discharge  Physical Exam  Vitals reviewed.   HENT:      Head: Normocephalic and atraumatic.      Right Ear: External ear normal.      Left Ear: External ear normal.      Nose: Nose normal.      Mouth/Throat:      Pharynx: Oropharynx is clear.   Eyes:      Conjunctiva/sclera: Conjunctivae normal.   Cardiovascular:      Rate and Rhythm: Normal rate and regular rhythm.      Pulses: Normal pulses.      Heart sounds: Normal heart sounds.   Pulmonary:      Effort: Pulmonary effort is normal.      Breath sounds: Normal breath sounds.   Abdominal:      General: Bowel sounds are normal.      Palpations: Abdomen is soft.   Musculoskeletal:         General: Normal range of motion.      Cervical back: Normal range of motion and neck supple.   Skin:     Findings: Bruising present.   Neurological:      General: No focal deficit present.      Mental Status: He is alert and oriented to person, place, and time.      Comments: forgetful   Psychiatric:         Mood and Affect: Mood normal.         Behavior: Behavior normal.     Outpatient Follow-Up  Future  Appointments   Date Time Provider Department Center   10/23/2024  2:30 PM Keshia Griffith PA-C [id number]         MARCELO Guajardo-CNP

## 2024-08-01 NOTE — NURSING NOTE
TOMASZ Gonzalez CNP aware that patient removed his tele and is refusing to allow me to put it back on at this time VBR

## 2024-08-01 NOTE — CARE PLAN
The patient's goals for the shift include pt will use call light for assist to prevent falls or injuries tonight    The clinical goals for the shift include pt will not fall or have any injuries tonight. Pt will remain safe     Bed alarm activated all night. No falls or injuries. Otherwise uneventful night

## 2024-08-01 NOTE — PROGRESS NOTES
08/01/24 0725   Discharge Planning   Home or Post Acute Services Post acute facilities (Rehab/SNF/etc)   Type of Post Acute Facility Services Skilled nursing   Expected Discharge Disposition SNF   Does the patient need discharge transport arranged? Yes   RoundTrip coordination needed? Yes     7:25 am  [facility] has accepted patient.  They are requesting PT/OT notes.  Updated NP Rachael.  Probable dc today.  TCC following.     11:20 AM  Abdominal and bladder xray completed for c/o abdominal pain.  Updated [facility].  TCC following.     12:55 PM Patient cleared for discharge. DSC setting up transportation time- requested 1:45 pm via stretcher, patient on 3L oxygen.  Updates to be sent to [facility] SNF.  Daughter was updated this morning of [facility] accepting patient and probable discharge.      2:25 pm [facility] ride for Gigi TOLBERT and will arrive on 08/01/2024 at 7:30pm EDT. Contact them at [phone] Team updated.

## 2024-08-04 ENCOUNTER — NURSING HOME VISIT (OUTPATIENT)
Dept: POST ACUTE CARE | Facility: EXTERNAL LOCATION | Age: 79
End: 2024-08-04
Payer: MEDICARE

## 2024-08-04 DIAGNOSIS — J44.9 CHRONIC OBSTRUCTIVE PULMONARY DISEASE, UNSPECIFIED COPD TYPE (MULTI): Primary | ICD-10-CM

## 2024-08-04 DIAGNOSIS — R53.1 WEAKNESS: ICD-10-CM

## 2024-08-04 DIAGNOSIS — K29.80 DUODENITIS: ICD-10-CM

## 2024-08-04 DIAGNOSIS — Z91.81 AT RISK FOR FALLS: ICD-10-CM

## 2024-08-04 DIAGNOSIS — J96.90 RESPIRATORY FAILURE, UNSPECIFIED CHRONICITY, UNSPECIFIED WHETHER WITH HYPOXIA OR HYPERCAPNIA (MULTI): ICD-10-CM

## 2024-08-04 DIAGNOSIS — I10 BENIGN ESSENTIAL HTN: ICD-10-CM

## 2024-08-04 DIAGNOSIS — F32.A DEPRESSION, UNSPECIFIED DEPRESSION TYPE: ICD-10-CM

## 2024-08-04 DIAGNOSIS — N40.0 BENIGN PROSTATIC HYPERPLASIA WITHOUT LOWER URINARY TRACT SYMPTOMS: ICD-10-CM

## 2024-08-04 DIAGNOSIS — S09.90XD CLOSED HEAD INJURY, SUBSEQUENT ENCOUNTER: ICD-10-CM

## 2024-08-04 DIAGNOSIS — F03.90 DEMENTIA, UNSPECIFIED DEMENTIA SEVERITY, UNSPECIFIED DEMENTIA TYPE, UNSPECIFIED WHETHER BEHAVIORAL, PSYCHOTIC, OR MOOD DISTURBANCE OR ANXIETY (MULTI): ICD-10-CM

## 2024-08-04 PROCEDURE — 99306 1ST NF CARE HIGH MDM 50: CPT | Performed by: INTERNAL MEDICINE

## 2024-08-04 NOTE — LETTER
Patient: Rui Carver  : 1945    Encounter Date: 2024    PLACE OF SERVICE:  [facility].    This is new/initial history and physical.    Subjective  Patient ID: Rui Carver is a 79 y.o. male who presents for New Patient Visit.    Mr. Gigi Carver is an 79-year-old male with history of dementia with frequent falls.  He suffers from COPD and respiratory failure.  He is unable to care for himself and requires supportive care.    Review of Systems   Constitutional:  Negative for chills and fever.   Cardiovascular:  Negative for chest pain.   All other systems reviewed and are negative.    Objective  /82   Pulse 84   Temp 36.7 °C (98.1 °F)   Resp 18     Physical Exam  Vitals reviewed.   Constitutional:       General: He is not in acute distress.     Comments: This is a well-developed and well-nourished male, sitting in a chair.   HENT:      Right Ear: Tympanic membrane, ear canal and external ear normal.      Left Ear: Tympanic membrane, ear canal and external ear normal.   Eyes:      General: No scleral icterus.     Pupils: Pupils are equal, round, and reactive to light.   Neck:      Vascular: No carotid bruit.   Cardiovascular:      Heart sounds: Normal heart sounds, S1 normal and S2 normal. No murmur heard.     No friction rub.   Pulmonary:      Effort: Pulmonary effort is normal.      Breath sounds: Decreased breath sounds (throughout) present.   Abdominal:      Palpations: There is no hepatomegaly, splenomegaly or mass.   Musculoskeletal:         General: No swelling or deformity. Normal range of motion.      Cervical back: Neck supple.      Right lower leg: No edema.      Left lower leg: No edema.   Lymphadenopathy:      Cervical: No cervical adenopathy.      Upper Body:      Right upper body: No axillary adenopathy.      Left upper body: No axillary adenopathy.      Lower Body: No right inguinal adenopathy. No left inguinal adenopathy.   Neurological:      Mental Status: He is  alert.      Cranial Nerves: Cranial nerves 2-12 are intact. No cranial nerve deficit.      Sensory: No sensory deficit.      Motor: Motor function is intact. No weakness.      Gait: Gait is intact.      Deep Tendon Reflexes: Reflexes normal.      Comments: The patient is oriented x2.   Psychiatric:         Mood and Affect: Mood normal. Mood is not anxious or depressed. Affect is not angry.         Behavior: Behavior is not agitated.         Thought Content: Thought content normal.         Judgment: Judgment normal.     LAB WORK:  Laboratory studies reviewed.    Assessment/Plan  Problem List Items Addressed This Visit             ICD-10-CM       Cardiac and Vasculature    Benign essential HTN I10       Gastrointestinal and Abdominal    Duodenitis K29.80       Genitourinary and Reproductive    Benign prostatic hyperplasia without lower urinary tract symptoms N40.0       Neuro    Dementia (Multi) F03.90       Pulmonary and Pneumonias    COPD (chronic obstructive pulmonary disease) (Multi) - Primary J44.9     Other Visit Diagnoses         Codes    Respiratory failure, unspecified chronicity, unspecified whether with hypoxia or hypercapnia (Multi)     J96.90    Weakness     R53.1    At risk for falls     Z91.81    Closed head injury, subsequent encounter     S09.90XD    Depression, unspecified depression type     F32.A        1. COPD, on bronchodilator therapy.  2. Respiratory failure, on oxygen.  3. Weakness, on PT/OT.  4. Fall risk, on fall precautions.  5. Closed head injury, continue to monitor.  6. Dementia, unchanged.  7. Duodenitis, on PPI.  8. Hypertension, med controlled.  9. Depression, on medication.  10. BPH, on tamsulosin.    Scribe Attestation  By signing my name below, IThao Scribe attest that this documentation has been prepared under the direction and in the presence of Abdullahi Marshall MD.     All medical record entries made by the scribe were personally dictated by me I have reviewed the chart  and agree the record accurately reflects my personal performance of his history physical examination and management      Electronically Signed By: Abdullahi Marshall MD   8/12/24 10:13 PM

## 2024-08-12 VITALS
SYSTOLIC BLOOD PRESSURE: 128 MMHG | RESPIRATION RATE: 18 BRPM | HEART RATE: 84 BPM | TEMPERATURE: 98.1 F | DIASTOLIC BLOOD PRESSURE: 82 MMHG

## 2024-08-12 ASSESSMENT — ENCOUNTER SYMPTOMS
CHILLS: 0
FEVER: 0

## 2024-08-12 NOTE — PROGRESS NOTES
PLACE OF SERVICE:  [facility].    This is new/initial history and physical.    Subjective   Patient ID: Rui Carver is a 79 y.o. male who presents for New Patient Visit.    Mr. Gigi Carver is an 79-year-old male with history of dementia with frequent falls.  He suffers from COPD and respiratory failure.  He is unable to care for himself and requires supportive care.    Review of Systems   Constitutional:  Negative for chills and fever.   Cardiovascular:  Negative for chest pain.   All other systems reviewed and are negative.    Objective   /82   Pulse 84   Temp 36.7 °C (98.1 °F)   Resp 18     Physical Exam  Vitals reviewed.   Constitutional:       General: He is not in acute distress.     Comments: This is a well-developed and well-nourished male, sitting in a chair.   HENT:      Right Ear: Tympanic membrane, ear canal and external ear normal.      Left Ear: Tympanic membrane, ear canal and external ear normal.   Eyes:      General: No scleral icterus.     Pupils: Pupils are equal, round, and reactive to light.   Neck:      Vascular: No carotid bruit.   Cardiovascular:      Heart sounds: Normal heart sounds, S1 normal and S2 normal. No murmur heard.     No friction rub.   Pulmonary:      Effort: Pulmonary effort is normal.      Breath sounds: Decreased breath sounds (throughout) present.   Abdominal:      Palpations: There is no hepatomegaly, splenomegaly or mass.   Musculoskeletal:         General: No swelling or deformity. Normal range of motion.      Cervical back: Neck supple.      Right lower leg: No edema.      Left lower leg: No edema.   Lymphadenopathy:      Cervical: No cervical adenopathy.      Upper Body:      Right upper body: No axillary adenopathy.      Left upper body: No axillary adenopathy.      Lower Body: No right inguinal adenopathy. No left inguinal adenopathy.   Neurological:      Mental Status: He is alert.      Cranial Nerves: Cranial nerves 2-12 are intact. No cranial  nerve deficit.      Sensory: No sensory deficit.      Motor: Motor function is intact. No weakness.      Gait: Gait is intact.      Deep Tendon Reflexes: Reflexes normal.      Comments: The patient is oriented x2.   Psychiatric:         Mood and Affect: Mood normal. Mood is not anxious or depressed. Affect is not angry.         Behavior: Behavior is not agitated.         Thought Content: Thought content normal.         Judgment: Judgment normal.     LAB WORK:  Laboratory studies reviewed.    Assessment/Plan   Problem List Items Addressed This Visit             ICD-10-CM       Cardiac and Vasculature    Benign essential HTN I10       Gastrointestinal and Abdominal    Duodenitis K29.80       Genitourinary and Reproductive    Benign prostatic hyperplasia without lower urinary tract symptoms N40.0       Neuro    Dementia (Multi) F03.90       Pulmonary and Pneumonias    COPD (chronic obstructive pulmonary disease) (Multi) - Primary J44.9     Other Visit Diagnoses         Codes    Respiratory failure, unspecified chronicity, unspecified whether with hypoxia or hypercapnia (Multi)     J96.90    Weakness     R53.1    At risk for falls     Z91.81    Closed head injury, subsequent encounter     S09.90XD    Depression, unspecified depression type     F32.A        1. COPD, on bronchodilator therapy.  2. Respiratory failure, on oxygen.  3. Weakness, on PT/OT.  4. Fall risk, on fall precautions.  5. Closed head injury, continue to monitor.  6. Dementia, unchanged.  7. Duodenitis, on PPI.  8. Hypertension, med controlled.  9. Depression, on medication.  10. BPH, on tamsulosin.    Scribe Attestation  By signing my name below, I, Nataliia Calix attest that this documentation has been prepared under the direction and in the presence of Abdullahi Marshall MD.     All medical record entries made by the scribe were personally dictated by me I have reviewed the chart and agree the record accurately reflects my personal performance of  his history physical examination and management

## 2024-10-23 ENCOUNTER — APPOINTMENT (OUTPATIENT)
Dept: PRIMARY CARE | Facility: CLINIC | Age: 79
End: 2024-10-23
Payer: MEDICARE